# Patient Record
Sex: FEMALE | Race: BLACK OR AFRICAN AMERICAN | Employment: OTHER | RURAL
[De-identification: names, ages, dates, MRNs, and addresses within clinical notes are randomized per-mention and may not be internally consistent; named-entity substitution may affect disease eponyms.]

---

## 2017-03-24 ENCOUNTER — TELEPHONE (OUTPATIENT)
Dept: FAMILY MEDICINE CLINIC | Age: 82
End: 2017-03-24

## 2017-03-24 NOTE — TELEPHONE ENCOUNTER
Patient called ,per [facility] pharmacist they did receive the refill request and hey are ready

## 2017-03-24 NOTE — TELEPHONE ENCOUNTER
[facility] has has [facility] and said 3 rx's were not called in and it it clearly noted in the chart it was done . [facility] .  She states she is out

## 2017-04-17 DIAGNOSIS — I10 ESSENTIAL HYPERTENSION: ICD-10-CM

## 2017-04-17 RX ORDER — AMLODIPINE AND BENAZEPRIL HYDROCHLORIDE 5; 10 MG/1; MG/1
1 CAPSULE ORAL DAILY
Qty: 30 CAP | Refills: 11 | Status: SHIPPED | OUTPATIENT
Start: 2017-04-17 | End: 2017-05-05 | Stop reason: SDUPTHER

## 2017-05-05 ENCOUNTER — OFFICE VISIT (OUTPATIENT)
Dept: FAMILY MEDICINE CLINIC | Age: 82
End: 2017-05-05

## 2017-05-05 VITALS
TEMPERATURE: 98.8 F | WEIGHT: 205 LBS | RESPIRATION RATE: 20 BRPM | HEART RATE: 68 BPM | HEIGHT: 63 IN | SYSTOLIC BLOOD PRESSURE: 154 MMHG | BODY MASS INDEX: 36.32 KG/M2 | OXYGEN SATURATION: 98 % | DIASTOLIC BLOOD PRESSURE: 63 MMHG

## 2017-05-05 DIAGNOSIS — I10 ESSENTIAL HYPERTENSION: ICD-10-CM

## 2017-05-05 DIAGNOSIS — E11.9 DIABETES MELLITUS TYPE 2, DIET-CONTROLLED (HCC): Primary | ICD-10-CM

## 2017-05-05 RX ORDER — AMLODIPINE AND BENAZEPRIL HYDROCHLORIDE 5; 10 MG/1; MG/1
1 CAPSULE ORAL DAILY
Qty: 30 CAP | Refills: 11
Start: 2017-05-05 | End: 2017-09-08 | Stop reason: SDUPTHER

## 2017-05-05 NOTE — PROGRESS NOTES
[address] [address] [facility] [place]   [phone]     5/5/2017  Chief Complaint   Patient presents with    Follow Up Chronic Condition     follow up/BW       HPI  Ms. Jesusita Patino is a 80 y.o. female     Right ankle pain-  Stinging sensation at night. States she has used an arthritis cream which is improving the pain. Patient Active Problem List   Diagnosis Code    Hypertension I10    Hypercholesterolemia E78.00    Chronic kidney disease N18.9    Diabetes mellitus type 2, diet-controlled ([facility] [address] E11.9      No Known Allergies    Current Outpatient Prescriptions on File Prior to Visit   Medication Sig Dispense Refill    metoprolol tartrate (LOPRESSOR) 50 mg tablet TAKE 1 TABLET BY MOUTH 2 TIMES DAILY 60 Tab 5    simvastatin (ZOCOR) 20 mg tablet TAKE 1 TABLET BY MOUTH EVERY NIGHT 30 Tab 5    hydroCHLOROthiazide (HYDRODIURIL) 25 mg tablet TAKE 1 TABLET BY MOUTH ONCE DAILY 30 Tab 5     No current facility-administered medications on file prior to visit.       Lab Results   Component Value Date/Time    Hemoglobin A1c 6.7 09/16/2016 09:55 AM    Hemoglobin A1c 6.6 02/10/2016 08:25 AM    Hemoglobin A1c 6.9 08/12/2015 08:15 AM    Glucose 127 09/16/2016 09:55 AM    Glucose  03/10/2014 01:40 AM    LDL, calculated 93 09/16/2016 09:55 AM    Creatinine 1.43 09/16/2016 09:55 AM     Lab Results   Component Value Date/Time    Cholesterol, total 154 09/16/2016 09:55 AM    HDL Cholesterol 43 09/16/2016 09:55 AM    LDL, calculated 93 09/16/2016 09:55 AM    VLDL, calculated 18 09/16/2016 09:55 AM    Triglyceride 89 09/16/2016 09:55 AM       Lab Results   Component Value Date/Time    Sodium 141 09/16/2016 09:55 AM    Potassium 4.6 09/16/2016 09:55 AM    Chloride 103 09/16/2016 09:55 AM    CO2 23 09/16/2016 09:55 AM    Glucose 127 09/16/2016 09:55 AM    BUN 23 09/16/2016 09:55 AM    Creatinine 1.43 09/16/2016 09:55 AM    BUN/Creatinine ratio 16 09/16/2016 09:55 AM    GFR est AA 39 09/16/2016 09:55 AM    GFR est non-AA 34 09/16/2016 09:55 AM    Calcium 9.2 09/16/2016 09:55 AM    Bilirubin, total 0.3 09/16/2016 09:55 AM    AST (SGOT) 11 09/16/2016 09:55 AM    Alk. phosphatase 66 09/16/2016 09:55 AM    Protein, total 7.6 09/16/2016 09:55 AM    Albumin 4.1 09/16/2016 09:55 AM    A-G Ratio 1.2 09/16/2016 09:55 AM    ALT (SGPT) 9 09/16/2016 09:55 AM         Visit Vitals    /63 (BP 1 Location: Left arm, BP Patient Position: Sitting)    Pulse 68    Temp 98.8 °F (37.1 °C) (Temporal)    Resp 20    Ht 5' 3\" (1.6 m)    Wt 205 lb (93 kg)    SpO2 98%    BMI 36.31 kg/m2     Physical Exam   Cardiovascular: Normal heart sounds. Pulmonary/Chest: Effort normal and breath sounds normal.   Abdominal: Soft. There is no tenderness. Vitals reviewed. Ordoñez July was seen today for follow up chronic condition. Diagnoses and all orders for this visit:    Diabetes mellitus type 2, diet-controlled ([facility] [address]  -     METABOLIC PANEL, BASIC  -     HEMOGLOBIN A1C WITH EAG    Essential hypertension  -     amLODIPine-benazepril (LOTREL) 5-10 mg per capsule; Take 1 Cap by mouth daily.       Plan   Discussed previous lab results  Refill medication  Continue with current regimen       Follow-up Disposition:  Return in about 4 months (around 9/5/2017) for wellness exam .      Angela Major PA-C, MHP

## 2017-05-05 NOTE — PROGRESS NOTES
[address] [address] Medical [place]   [phone]     5/5/2017  Chief Complaint   Patient presents with    Follow Up Chronic Condition     follow up       HPI  Ms. Bryan Armstrong is a 80 y.o. female       Patient Active Problem List   Diagnosis Code    Hypertension I10    Hypercholesterolemia E78.00    Chronic kidney disease N18.9    Diabetes mellitus type 2, diet-controlled ([facility] [address] E11.9      No Known Allergies    Current Outpatient Prescriptions on File Prior to Visit   Medication Sig Dispense Refill    metoprolol tartrate (LOPRESSOR) 50 mg tablet TAKE 1 TABLET BY MOUTH 2 TIMES DAILY 60 Tab 5    simvastatin (ZOCOR) 20 mg tablet TAKE 1 TABLET BY MOUTH EVERY NIGHT 30 Tab 5    hydroCHLOROthiazide (HYDRODIURIL) 25 mg tablet TAKE 1 TABLET BY MOUTH ONCE DAILY 30 Tab 5    amLODIPine-benazepril (LOTREL) 5-10 mg per capsule TAKE 1 CAPSULE BY MOUTH DAILY. 30 Cap 5     No current facility-administered medications on file prior to visit. Visit Vitals    /63 (BP 1 Location: Left arm, BP Patient Position: Sitting)    Pulse 68    Temp 98.8 °F (37.1 °C) (Temporal)    Resp 20    Ht 5' 3\" (1.6 m)    Wt 205 lb (93 kg)    SpO2 98%    BMI 36.31 kg/m2     Physical Exam        Ayden oJhnson was seen today for follow up chronic condition.     Diagnoses and all orders for this visit:    Diabetes mellitus type 2, diet-controlled ([facility] [address]    Plan         Follow-up Disposition: Not on File      Link De La Torre [place], [place]

## 2017-05-05 NOTE — MR AVS SNAPSHOT
Visit Information Date & Time Provider Department Dept. Phone Encounter #  
 5/5/2017 11:30 AM Crystal Torrez, [address] [phone] [id number] Follow-up Instructions Return in about 4 months (around 9/5/2017) for wellness exam . Upcoming Health Maintenance Date Due  
 FOOT EXAM Q1 7/24/1943 MICROALBUMIN Q1 7/24/1943 EYE EXAM RETINAL OR DILATED Q1 7[birth year]1943 GLAUCOMA SCREENING Q2Y 7/24/1998 OSTEOPOROSIS SCREENING (DEXA) 7/24/1998 Pneumococcal 65+ Low/Medium Risk (1 of 2 - PCV13) 7/24/1998 HEMOGLOBIN A1C Q6M 3/16/2017 INFLUENZA AGE 9 TO ADULT 8/1/2017 LIPID PANEL Q1 9/16/2017 MEDICARE YEARLY EXAM 9/17/2017 DTaP/Tdap/Td series (2 - Td) 9/16/2026 Allergies as of 5/5/2017  Review Complete On: 5/5/2017 By: CRISTIANA Murry No Known Allergies Current Immunizations  Reviewed on 9/16/2016 No immunizations on file. Not reviewed this visit You Were Diagnosed With   
  
 Codes Comments Diabetes mellitus type 2, [facility]-controlled ([facility]    -  Primary ICD-10-CM: E11.9 ICD-9-CM: 250.00 Essential hypertension     ICD-10-CM: I10 
ICD-9-CM: 401.9 Vitals BP Pulse Temp Resp Height(growth percentile) 154/63 (BP 1 Location: Left arm, BP Patient Position: Sitting) 68 98.8 °F (37.1 °C) (Temporal) 20 5' 3\" (1.6 m) Weight(growth percentile) SpO2 BMI OB Status Smoking Status 205 lb (93 kg) 98% 36.31 kg/m2 Postmenopausal Never Smoker BMI and BSA Data Body Mass Index Body Surface Area  
 36.31 kg/m 2 2.03 m 2 Preferred Pharmacy Pharmacy Name Phone [id number] [address] [phone] Your Updated Medication List  
  
   
This list is accurate as of: 5/5/17 11:49 AM.  Always use your most recent med list. amLODIPine-benazepril 5-10 mg per capsule Commonly known as:  Bekah Ritchie  
 Take 1 Cap by mouth daily. hydroCHLOROthiazide 25 mg tablet Commonly known as:  HYDRODIURIL  
TAKE 1 TABLET BY MOUTH ONCE DAILY  
  
 metoprolol tartrate 50 mg tablet Commonly known as:  LOPRESSOR  
TAKE 1 TABLET BY MOUTH 2 TIMES DAILY  
  
 simvastatin 20 mg tablet Commonly known as:  ZOCOR  
TAKE 1 TABLET BY MOUTH EVERY NIGHT We Performed the Following HEMOGLOBIN A1C WITH EAG [00880 CPT(R)] METABOLIC PANEL, BASIC [86746 CPT(R)] Follow-up Instructions Return in about 4 months (around 9/5/2017) for wellness exam . Introducing \[facility]\"" & HEALTH SERVICES! Julio Greenfield introduces [employer] patient portal. Now you can access parts of your medical record, email your doctor's office, and request medication refills online. 1. In your internet browser, go to [email or web address] 2. Click on the First Time User? Click Here link in the Sign In box. You will see the New Member Sign Up page. 3. Enter your [employer] Access Code exactly as it appears below. You will not need to use this code after youve completed the sign-up process. If you do not sign up before the expiration date, you must request a new code. · [employer] Access Code: [email or web address] Expires: 8/3/2017 11:49 AM 
 
4. Enter the last four digits of your Social Security Number (xxxx) and Date of Birth (mm/dd/yyyy) as indicated and click Submit. You will be taken to the next sign-up page. 5. Create a [employer] ID. This will be your [employer] login ID and cannot be changed, so think of one that is secure and easy to remember. 6. Create a [employer] password. You can change your password at any time. 7. Enter your Password Reset Question and Answer. This can be used at a later time if you forget your password. 8. Enter your e-mail address. You will receive e-mail notification when new information is available in [address] 9. Click Sign Up. You can now view and download portions of your medical record. 10. Click the Download Summary menu link to download a portable copy of your medical information. If you have questions, please visit the Frequently Asked Questions section of the [employer] website. Remember, [employer] is NOT to be used for urgent needs. For medical emergencies, dial 911. Now available from your iPhone and Android! Please provide this summary of care documentation to your next provider. Your primary care clinician is listed as Carleen Spurling. If you have any questions after today's visit, please call [phone].

## 2017-05-06 LAB
BUN SERPL-MCNC: 24 MG/DL (ref 8–27)
BUN/CREAT SERPL: 18 (ref 12–28)
CALCIUM SERPL-MCNC: 9.3 MG/DL (ref 8.7–10.3)
CHLORIDE SERPL-SCNC: 102 MMOL/L (ref 96–106)
CO2 SERPL-SCNC: 19 MMOL/L (ref 18–29)
CREAT SERPL-MCNC: 1.34 MG/DL (ref 0.57–1)
EST. AVERAGE GLUCOSE BLD GHB EST-MCNC: 146 MG/DL
GLUCOSE SERPL-MCNC: 106 MG/DL (ref 65–99)
HBA1C MFR BLD: 6.7 % (ref 4.8–5.6)
INTERPRETATION: NORMAL
POTASSIUM SERPL-SCNC: 4.8 MMOL/L (ref 3.5–5.2)
SODIUM SERPL-SCNC: 139 MMOL/L (ref 134–144)

## 2017-05-08 NOTE — PROGRESS NOTES
Kidney function is diminished but stable  Blood sugar is well controlled     Continue with current regimen

## 2017-09-08 ENCOUNTER — OFFICE VISIT (OUTPATIENT)
Dept: FAMILY MEDICINE CLINIC | Age: 82
End: 2017-09-08

## 2017-09-08 VITALS
WEIGHT: 203 LBS | DIASTOLIC BLOOD PRESSURE: 65 MMHG | RESPIRATION RATE: 16 BRPM | OXYGEN SATURATION: 98 % | TEMPERATURE: 98.4 F | HEART RATE: 62 BPM | BODY MASS INDEX: 35.97 KG/M2 | HEIGHT: 63 IN | SYSTOLIC BLOOD PRESSURE: 144 MMHG

## 2017-09-08 DIAGNOSIS — I10 ESSENTIAL HYPERTENSION: ICD-10-CM

## 2017-09-08 DIAGNOSIS — Z00.00 MEDICARE ANNUAL WELLNESS VISIT, SUBSEQUENT: Primary | ICD-10-CM

## 2017-09-08 DIAGNOSIS — N18.30 CHRONIC KIDNEY DISEASE, STAGE 3 (MODERATE): ICD-10-CM

## 2017-09-08 DIAGNOSIS — Z71.89 ADVANCE DIRECTIVE DISCUSSED WITH PATIENT: ICD-10-CM

## 2017-09-08 DIAGNOSIS — E78.00 HYPERCHOLESTEROLEMIA: ICD-10-CM

## 2017-09-08 DIAGNOSIS — E11.9 DIABETES MELLITUS TYPE 2, DIET-CONTROLLED (HCC): ICD-10-CM

## 2017-09-08 RX ORDER — METOPROLOL TARTRATE 50 MG/1
50 TABLET ORAL 2 TIMES DAILY
Qty: 60 TAB | Refills: 5 | Status: SHIPPED | OUTPATIENT
Start: 2017-09-08 | End: 2018-03-15 | Stop reason: SDUPTHER

## 2017-09-08 RX ORDER — AMLODIPINE AND BENAZEPRIL HYDROCHLORIDE 5; 10 MG/1; MG/1
1 CAPSULE ORAL DAILY
Qty: 90 CAP | Refills: 1 | Status: SHIPPED | OUTPATIENT
Start: 2017-09-08 | End: 2018-09-17 | Stop reason: SDUPTHER

## 2017-09-08 RX ORDER — SIMVASTATIN 20 MG/1
20 TABLET, FILM COATED ORAL
Qty: 90 TAB | Refills: 1 | Status: SHIPPED | OUTPATIENT
Start: 2017-09-08 | End: 2018-03-15 | Stop reason: SDUPTHER

## 2017-09-08 RX ORDER — HYDROCHLOROTHIAZIDE 25 MG/1
25 TABLET ORAL DAILY
Qty: 90 TAB | Refills: 1 | Status: SHIPPED | OUTPATIENT
Start: 2017-09-08 | End: 2018-03-15 | Stop reason: SDUPTHER

## 2017-09-08 NOTE — PATIENT INSTRUCTIONS

## 2017-09-08 NOTE — MR AVS SNAPSHOT
Visit Information Date & Time Provider Department Dept. Phone Encounter #  
 9/8/2017  9:00 AM Álvaro Stephenson [address] [phone] [id number] Follow-up Instructions Return in about 6 months (around 3/8/2018). Upcoming Health Maintenance Date Due  
 FOOT EXAM Q1 7/24/1943 MICROALBUMIN Q1 7/24/1943 EYE EXAM RETINAL OR DILATED Q1 7/24/1943 GLAUCOMA SCREENING Q2Y 7/24/1998 OSTEOPOROSIS SCREENING (DEXA) 7/24/1998 Pneumococcal 65+ Low/Medium Risk (1 of 2 - PCV13) 7/24/1998 INFLUENZA AGE 9 TO ADULT 8/1/2017 LIPID PANEL Q1 9/16/2017 MEDICARE YEARLY EXAM 9/17/2017 HEMOGLOBIN A1C Q6M 11/5/2017 DTaP/Tdap/Td series (2 - Td) 9/16/2026 Allergies as of 9/8/2017  Review Complete On: 9/8/2017 By: CRISTIANA Gonzalez No Known Allergies Current Immunizations  Reviewed on 9/16/2016 No immunizations on file. Not reviewed this visit You Were Diagnosed With   
  
 Codes Comments Diabetes mellitus type 2, diet-controlled ([facility] [address]    -  Primary ICD-10-CM: E11.9 ICD-9-CM: 250.00 Essential hypertension     ICD-10-CM: I10 
ICD-9-CM: 401.9 Chronic kidney disease, stage 3 (moderate)     ICD-10-CM: N18.3 ICD-9-CM: 873. 3 Advance directive discussed with patient     ICD-10-CM: Z71.89 ICD-9-CM: V65.49 Hypercholesterolemia     ICD-10-CM: E78.00 ICD-9-CM: 272.0 Vitals BP Pulse Temp Resp Height(growth percentile) 144/65 (BP 1 Location: Left arm, BP Patient Position: Sitting) 62 98.4 °F (36.9 °C) (Temporal) 16 5' 3\" (1.6 m) Weight(growth percentile) SpO2 BMI OB Status Smoking Status 203 lb (92.1 kg) 98% 35.96 kg/m2 Postmenopausal Never Smoker Vitals History BMI and BSA Data Body Mass Index Body Surface Area 35.96 kg/m 2 2.02 m 2 Preferred Pharmacy Pharmacy Name Phone [id number], [address] [phone] Your Updated Medication List  
  
   
This list is accurate as of: 9/8/17  9:24 AM.  Always use your most recent med list. amLODIPine-benazepril 5-10 mg per capsule Commonly known as:  Jennifer Dimas Take 1 Cap by mouth daily. Indications: hypertension  
  
 hydroCHLOROthiazide 25 mg tablet Commonly known as:  HYDRODIURIL Take 1 Tab by mouth daily. metoprolol tartrate 50 mg tablet Commonly known as:  LOPRESSOR Take 1 Tab by mouth two (2) times a day. simvastatin 20 mg tablet Commonly known as:  ZOCOR Take 1 Tab by mouth nightly. Indications: mixed hyperlipidemia Prescriptions Sent to Pharmacy Refills  
 amLODIPine-benazepril (LOTREL) 5-10 mg per capsule 1 Sig: Take 1 Cap by mouth daily. Indications: hypertension Class: Normal  
 Pharmacy: [id number], [address] Ph #: [phone] Route: Oral  
 metoprolol tartrate (LOPRESSOR) 50 mg tablet 5 Sig: Take 1 Tab by mouth two (2) times a day. Class: Normal  
 Pharmacy: [facility][id number] [facility], [address] Ph #: [phone] Route: Oral  
 simvastatin (ZOCOR) 20 mg tablet 1 Sig: Take 1 Tab by mouth nightly. Indications: mixed hyperlipidemia Class: Normal  
 Pharmacy: [facility] #[id number] [place], [address] Ph #: [phone] Route: Oral  
 hydroCHLOROthiazide (HYDRODIURIL) 25 mg tablet 1 Sig: Take 1 Tab by mouth daily. Class: Normal  
 Pharmacy: [facility] #[id number] [place], [address] Ph #: [phone] Route: Oral  
  
Follow-up Instructions Return in about 6 months (around 3/8/2018). Introducing [facility] & HEALTH SERVICES! [facility] introduces [employer] patient portal. Now you can access parts of your medical record, email your doctor's office, and request medication refills online.    
 
1. In your internet browser, go to [email or web address] 2. Click on the First Time User? Click Here link in the Sign In box. You will see the New Member Sign Up page. 3. Enter your [employer] Access Code exactly as it appears below. You will not need to use this code after youve completed the sign-up process. If you do not sign up before the expiration date, you must request a new code. · [employer] Access Code: [email or web address] Expires: 12/7/2017  9:22 AM 
 
4. Enter the last four digits of your Social Security Number (xxxx) and Date of Birth (mm/dd/yyyy) as indicated and click Submit. You will be taken to the next sign-up page. 5. Create a [employer] ID. This will be your [employer] login ID and cannot be changed, so think of one that is secure and easy to remember. 6. Create a [employer] password. You can change your password at any time. 7. Enter your Password Reset Question and Answer. This can be used at a later time if you forget your password. 8. Enter your e-mail address. You will receive e-mail notification when new information is available in [address] 9. Click Sign Up. You can now view and download portions of your medical record. 10. Click the Download Summary menu link to download a portable copy of your medical information. If you have questions, please visit the Frequently Asked Questions section of the [employer] website. Remember, [employer] is NOT to be used for urgent needs. For medical emergencies, dial 911. Now available from your iPhone and Android! Please provide this summary of care documentation to your next provider. Your primary care clinician is listed as Carley Akhtar. If you have any questions after today's visit, please call [phone].

## 2017-09-08 NOTE — PROGRESS NOTES
[address], [facility] [place]   [phone]     9/8/2017  Chief Complaint   Patient presents with   [facility] Annual Wellness Visit       HPI  Ms. Roxanne Lujan is a 80 y.o. female presents today in routine follow up. States she is feeling well overall and without concerns. Past Medical History:   Diagnosis Date    Chronic kidney disease     Hypercholesterolemia     Hypertension        No Known Allergies        Current Outpatient Prescriptions:     amLODIPine-benazepril (LOTREL) 5-10 mg per capsule, Take 1 Cap by mouth daily. Indications: hypertension, Disp: 90 Cap, Rfl: 1    metoprolol tartrate (LOPRESSOR) 50 mg tablet, Take 1 Tab by mouth two (2) times a day., Disp: 60 Tab, Rfl: 5    simvastatin (ZOCOR) 20 mg tablet, Take 1 Tab by mouth nightly. Indications: mixed hyperlipidemia, Disp: 90 Tab, Rfl: 1    hydroCHLOROthiazide (HYDRODIURIL) 25 mg tablet, Take 1 Tab by mouth daily. , Disp: 90 Tab, Rfl: 1    Lab Results   Component Value Date/Time    Hemoglobin A1c 6.7 05/05/2017 11:48 AM    Hemoglobin A1c 6.7 09/16/2016 09:55 AM    Hemoglobin A1c 6.6 02/10/2016 08:25 AM    Glucose 106 05/05/2017 11:48 AM    Glucose  03/10/2014 01:40 AM    LDL, calculated 93 09/16/2016 09:55 AM    Creatinine 1.34 05/05/2017 11:48 AM           Visit Vitals    /65 (BP 1 Location: Left arm, BP Patient Position: Sitting)    Pulse 62    Temp 98.4 °F (36.9 °C) (Temporal)    Resp 16    Ht 5' 3\" (1.6 m)    Wt 203 lb (92.1 kg)    SpO2 98%    BMI 35.96 kg/m2     Physical Exam   Constitutional: She appears well-developed and well-nourished. No distress. Cardiovascular: Normal heart sounds. Pulmonary/Chest: Effort normal and breath sounds normal.   Abdominal: Soft. Vitals reviewed. ICD-10-CM ICD-9-CM    1. Medicare annual wellness visit, subsequent Z00.00 V70.0    2.  Diabetes mellitus type 2, diet-controlled (HCC) E11.9 250.00 HEMOGLOBIN A1C WITH EAG   3. Essential hypertension I10 401.9 amLODIPine-benazepril (LOTREL) 5-10 mg per capsule      metoprolol tartrate (LOPRESSOR) 50 mg tablet      hydroCHLOROthiazide (HYDRODIURIL) 25 mg tablet   4. Chronic kidney disease, stage 3 (moderate) N18.3 585.3 CBC WITH AUTOMATED DIFF      METABOLIC PANEL, COMPREHENSIVE   5. Advance directive discussed with patient Z71.89 V65.49    6. Hypercholesterolemia E78.00 272.0 simvastatin (ZOCOR) 20 mg tablet      LIPID PANEL WITH LDL/HDL RATIO           Follow-up Disposition:  Return in about 6 months (around 3/8/2018). Gayayala Genoa ADELSO VILLANUEVA    This is a Subsequent Medicare Annual Wellness Exam (AWV) (Performed 12 months after IPPE or effective date of Medicare Part B enrollment, Once in a lifetime)    I have reviewed the patient's medical history in detail and updated the computerized patient record. History     Past Medical History:   Diagnosis Date    Chronic kidney disease     Hypercholesterolemia     Hypertension       No past surgical history on file. Current Outpatient Prescriptions   Medication Sig Dispense Refill    amLODIPine-benazepril (LOTREL) 5-10 mg per capsule Take 1 Cap by mouth daily. Indications: hypertension 90 Cap 1    metoprolol tartrate (LOPRESSOR) 50 mg tablet Take 1 Tab by mouth two (2) times a day. 60 Tab 5    simvastatin (ZOCOR) 20 mg tablet Take 1 Tab by mouth nightly. Indications: mixed hyperlipidemia 90 Tab 1    hydroCHLOROthiazide (HYDRODIURIL) 25 mg tablet Take 1 Tab by mouth daily. 90 Tab 1     No Known Allergies  No family history on file.   Social History   Substance Use Topics    Smoking status: Never Smoker    Smokeless tobacco: Not on file    Alcohol use No     Patient Active Problem List   Diagnosis Code    Hypertension I10    Hypercholesterolemia E78.00    Chronic kidney disease N18.9    Diabetes mellitus type 2, diet-controlled ([facility] [address] E11.9       Depression Risk Factor Screening:     PHQ over the last two weeks 9/8/2017   Little interest or pleasure in doing things Not at all   Feeling down, depressed or hopeless Not at all   Total Score PHQ 2 0     Alcohol Risk Factor Screening: You do not drink alcohol or very rarely. Functional Ability and Level of Safety:   Hearing Loss  Hearing is good. Activities of Daily Living  The home contains: no safety equipment  Patient does total self care    Fall Risk  Fall Risk Assessment, last 12 mths 9/8/2017   Able to walk? Yes   Fall in past 12 months? No       Abuse Screen  Patient is not abused    Cognitive Screening   Evaluation of Cognitive Function:  Has your family/caregiver stated any concerns about your memory: no  Normal    Patient Care Team   Patient Care Team:  CRISTIANA Mendoza as PCP - General    Assessment/Plan   Education and counseling provided:  Are appropriate based on today's review and evaluation    Diagnoses and all orders for this visit:    1. Medicare annual wellness visit, subsequent    2. Diabetes mellitus type 2, diet-controlled (HCC)  -     HEMOGLOBIN A1C WITH EAG    3. Essential hypertension  -     amLODIPine-benazepril (LOTREL) 5-10 mg per capsule; Take 1 Cap by mouth daily. Indications: hypertension  -     metoprolol tartrate (LOPRESSOR) 50 mg tablet; Take 1 Tab by mouth two (2) times a day. -     hydroCHLOROthiazide (HYDRODIURIL) 25 mg tablet; Take 1 Tab by mouth daily. 4. Chronic kidney disease, stage 3 (moderate)  -     CBC WITH AUTOMATED DIFF  -     METABOLIC PANEL, COMPREHENSIVE    5. Advance directive discussed with patient    6. Hypercholesterolemia  -     simvastatin (ZOCOR) 20 mg tablet; Take 1 Tab by mouth nightly.  Indications: mixed hyperlipidemia  -     LIPID PANEL WITH LDL/HDL RATIO        Health Maintenance Due   Topic Date Due    FOOT EXAM Q1  07/24/1943    MICROALBUMIN Q1  07/24/1943    EYE EXAM RETINAL OR DILATED Q1  07/24/1943    GLAUCOMA SCREENING Q2Y  07/24/1998    OSTEOPOROSIS SCREENING (DEXA)  07/24/1998    Pneumococcal 65+ Low/Medium Risk (1 of 2 - PCV13) 07/24/1998    INFLUENZA AGE 9 TO ADULT  08/01/2017    LIPID PANEL Q1  09/16/2017

## 2017-09-09 LAB
ALBUMIN SERPL-MCNC: 4.3 G/DL (ref 3.5–4.7)
ALBUMIN/GLOB SERPL: 1.2 {RATIO} (ref 1.2–2.2)
ALP SERPL-CCNC: 68 IU/L (ref 39–117)
ALT SERPL-CCNC: 9 IU/L (ref 0–32)
AST SERPL-CCNC: 13 IU/L (ref 0–40)
BASOPHILS # BLD AUTO: 0 X10E3/UL (ref 0–0.2)
BASOPHILS NFR BLD AUTO: 1 %
BILIRUB SERPL-MCNC: 0.5 MG/DL (ref 0–1.2)
BUN SERPL-MCNC: 23 MG/DL (ref 8–27)
BUN/CREAT SERPL: 16 (ref 12–28)
CALCIUM SERPL-MCNC: 9.3 MG/DL (ref 8.7–10.3)
CHLORIDE SERPL-SCNC: 102 MMOL/L (ref 96–106)
CHOLEST SERPL-MCNC: 155 MG/DL (ref 100–199)
CO2 SERPL-SCNC: 21 MMOL/L (ref 18–29)
CREAT SERPL-MCNC: 1.48 MG/DL (ref 0.57–1)
EOSINOPHIL # BLD AUTO: 0.3 X10E3/UL (ref 0–0.4)
EOSINOPHIL NFR BLD AUTO: 5 %
ERYTHROCYTE [DISTWIDTH] IN BLOOD BY AUTOMATED COUNT: 15.2 % (ref 12.3–15.4)
EST. AVERAGE GLUCOSE BLD GHB EST-MCNC: 146 MG/DL
GLOBULIN SER CALC-MCNC: 3.6 G/DL (ref 1.5–4.5)
GLUCOSE SERPL-MCNC: 127 MG/DL (ref 65–99)
HBA1C MFR BLD: 6.7 % (ref 4.8–5.6)
HCT VFR BLD AUTO: 34.6 % (ref 34–46.6)
HDLC SERPL-MCNC: 44 MG/DL
HGB BLD-MCNC: 11.4 G/DL (ref 11.1–15.9)
IMM GRANULOCYTES # BLD: 0 X10E3/UL (ref 0–0.1)
IMM GRANULOCYTES NFR BLD: 0 %
INTERPRETATION: NORMAL
LDLC SERPL CALC-MCNC: 93 MG/DL (ref 0–99)
LDLC/HDLC SERPL: 2.1 RATIO UNITS (ref 0–3.2)
LYMPHOCYTES # BLD AUTO: 1.9 X10E3/UL (ref 0.7–3.1)
LYMPHOCYTES NFR BLD AUTO: 27 %
MCH RBC QN AUTO: 28.8 PG (ref 26.6–33)
MCHC RBC AUTO-ENTMCNC: 32.9 G/DL (ref 31.5–35.7)
MCV RBC AUTO: 87 FL (ref 79–97)
MONOCYTES # BLD AUTO: 0.8 X10E3/UL (ref 0.1–0.9)
MONOCYTES NFR BLD AUTO: 11 %
NEUTROPHILS # BLD AUTO: 4.1 X10E3/UL (ref 1.4–7)
NEUTROPHILS NFR BLD AUTO: 56 %
PLATELET # BLD AUTO: 265 X10E3/UL (ref 150–379)
POTASSIUM SERPL-SCNC: 4.8 MMOL/L (ref 3.5–5.2)
PROT SERPL-MCNC: 7.9 G/DL (ref 6–8.5)
RBC # BLD AUTO: 3.96 X10E6/UL (ref 3.77–5.28)
SODIUM SERPL-SCNC: 140 MMOL/L (ref 134–144)
TRIGL SERPL-MCNC: 89 MG/DL (ref 0–149)
VLDLC SERPL CALC-MCNC: 18 MG/DL (ref 5–40)
WBC # BLD AUTO: 7.2 X10E3/UL (ref 3.4–10.8)

## 2017-09-15 NOTE — PROGRESS NOTES
Kidney function is diminished but stable. Cholesterol levels are within normal limits   Blood sugar level is elevated but stable. No indication for medication. Adhere to a diabetic diet.

## 2018-03-15 DIAGNOSIS — E78.00 HYPERCHOLESTEROLEMIA: ICD-10-CM

## 2018-03-15 DIAGNOSIS — I10 ESSENTIAL HYPERTENSION: ICD-10-CM

## 2018-03-20 RX ORDER — HYDROCHLOROTHIAZIDE 25 MG/1
TABLET ORAL
Qty: 90 TAB | Refills: 1 | Status: SHIPPED | OUTPATIENT
Start: 2018-03-20 | End: 2018-09-14 | Stop reason: SDUPTHER

## 2018-03-20 RX ORDER — SIMVASTATIN 20 MG/1
TABLET, FILM COATED ORAL
Qty: 90 TAB | Refills: 1 | Status: SHIPPED | OUTPATIENT
Start: 2018-03-20 | End: 2018-09-14 | Stop reason: SDUPTHER

## 2018-03-20 RX ORDER — METOPROLOL TARTRATE 50 MG/1
TABLET ORAL
Qty: 60 TAB | Refills: 5 | Status: SHIPPED | OUTPATIENT
Start: 2018-03-20 | End: 2018-09-14 | Stop reason: SDUPTHER

## 2018-03-22 ENCOUNTER — OFFICE VISIT (OUTPATIENT)
Dept: FAMILY MEDICINE CLINIC | Age: 83
End: 2018-03-22

## 2018-03-22 VITALS
HEIGHT: 63 IN | DIASTOLIC BLOOD PRESSURE: 72 MMHG | OXYGEN SATURATION: 97 % | HEART RATE: 62 BPM | RESPIRATION RATE: 22 BRPM | BODY MASS INDEX: 35.9 KG/M2 | TEMPERATURE: 97.8 F | WEIGHT: 202.6 LBS | SYSTOLIC BLOOD PRESSURE: 140 MMHG

## 2018-03-22 DIAGNOSIS — R73.03 PRE-DIABETES: Primary | ICD-10-CM

## 2018-03-22 DIAGNOSIS — I10 ESSENTIAL HYPERTENSION: ICD-10-CM

## 2018-03-22 DIAGNOSIS — Z01.818 PRE-OP EVALUATION: ICD-10-CM

## 2018-03-22 DIAGNOSIS — E78.00 HYPERCHOLESTEROLEMIA: ICD-10-CM

## 2018-03-22 PROBLEM — E66.01 SEVERE OBESITY (BMI 35.0-39.9) WITH COMORBIDITY (HCC): Status: ACTIVE | Noted: 2018-03-22

## 2018-03-22 PROBLEM — E11.21 TYPE 2 DIABETES WITH NEPHROPATHY (HCC): Status: ACTIVE | Noted: 2018-03-22

## 2018-03-22 LAB
ALBUMIN UR QL STRIP: 80 MG/L
CREATININE, URINE POC: 100 MG/DL
MICROALBUMIN/CREAT RATIO POC: NORMAL MG/G

## 2018-03-22 NOTE — MR AVS SNAPSHOT
[address] [address] [address] 
[phone] Patient: Waylon Reis MRN: [id number] VTN:1/44/0495 Visit Information Date & Time Provider Department Dept. Phone Encounter #  
 3/22/2018  8:00 AM Andra Alvarado NP [facility] [address] [facility] [phone] [id number] Upcoming Health Maintenance Date Due  
 FOOT EXAM Q1 7/24/1943 MICROALBUMIN Q1 7/24/1943 HEMOGLOBIN A1C Q6M 3/8/2018 Bone Densitometry (Dexa) Screening 3/23/2019* Pneumococcal 65+ Low/Medium Risk (1 of 2 - PCV13) 3/23/2019* Influenza Age 5 to Adult 3/23/2019* LIPID PANEL Q1 9/8/2018 MEDICARE YEARLY EXAM 9/9/2018 EYE EXAM RETINAL OR DILATED Q1 10/27/2018 GLAUCOMA SCREENING Q2Y 10/27/2019 DTaP/Tdap/Td series (2 - Td) 9/16/2026 *Topic was postponed. The date shown is not the original due date. Allergies as of 3/22/2018  Review Complete On: 3/22/2018 By: Rubens Traylor RN No Known Allergies Current Immunizations  Reviewed on 9/16/2016 No immunizations on file. Not reviewed this visit You Were Diagnosed With   
  
 Codes Comments Pre-diabetes    -  Primary ICD-10-CM: R73.03 
ICD-9-CM: 790.29 BMI 35.0-35.9,adult     ICD-10-CM: I74.77 ICD-9-CM: V85.35 Pre-op evaluation     ICD-10-CM: E11.719 ICD-9-CM: V72.84 Essential hypertension     ICD-10-CM: I10 
ICD-9-CM: 401.9 Hypercholesterolemia     ICD-10-CM: E78.00 ICD-9-CM: 272.0 Vitals BP Pulse Temp Resp Height(growth percentile) 140/72 (BP 1 Location: Left arm, BP Patient Position: Sitting) 62 97.8 °F (36.6 °C) (Axillary) 22 5' 3\" (1.6 m) Weight(growth percentile) SpO2 BMI OB Status Smoking Status 202 lb 9.6 oz (91.9 kg) 97% 35.89 kg/m2 Postmenopausal Never Smoker Vitals History BMI and BSA Data Body Mass Index Body Surface Area  
 35.89 kg/m 2 2.02 m 2 Preferred Pharmacy Pharmacy Name Phone [id number] [phone] Your Updated Medication List  
  
   
This list is accurate as of 3/22/18  9:04 AM.  Always use your most recent med list. amLODIPine-benazepril 5-10 mg per capsule Commonly known as:  Rodriguez Julioh Take 1 Cap by mouth daily. Indications: hypertension  
  
 hydroCHLOROthiazide 25 mg tablet Commonly known as:  HYDRODIURIL  
TAKE 1 TABLET BY MOUTH EVERY DAY  
  
 metoprolol tartrate 50 mg tablet Commonly known as:  LOPRESSOR  
TAKE 1 TABLET BY MOUTH TWICE A DAY  
  
 simvastatin 20 mg tablet Commonly known as:  ZOCOR  
TAKE 1 TABLET BY MOUTH NIGHTLY We Performed the Following CBC WITH AUTOMATED DIFF [05603 CPT(R)] COLLECTION VENOUS BLOOD,VENIPUNCTURE [id number] CPT(R)] HEMOGLOBIN A1C WITH EAG [53856 CPT(R)] LIPID PANEL [85498 CPT(R)] METABOLIC PANEL, COMPREHENSIVE [60696 CPT(R)] Introducing [facility] & HEALTH SERVICES! [facility] introduces Between patient portal. Now you can access parts of your medical record, email your doctor's office, and request medication refills online. 1. In your internet browser, go to [email or web address] 2. Click on the First Time User? Click Here link in the Sign In box. You will see the New Member Sign Up page. 3. Enter your Between Access Code exactly as it appears below. You will not need to use this code after youve completed the sign-up process. If you do not sign up before the expiration date, you must request a new code. · Between Access Code: [email or web address] Expires: 6/20/2018  9:04 AM 
 
4. Enter the last four digits of your Social Security Number (xxxx) and Date of Birth (mm/dd/yyyy) as indicated and click Submit. You will be taken to the next sign-up page. 5. Create a Between ID. This will be your Between login ID and cannot be changed, so think of one that is secure and easy to remember. 6. Create a [employer] Systems password. You can change your password at any time. 7. Enter your Password Reset Question and Answer. This can be used at a later time if you forget your password. 8. Enter your e-mail address. You will receive e-mail notification when new information is available in [address] 9. Click Sign Up. You can now view and download portions of your medical record. 10. Click the Download Summary menu link to download a portable copy of your medical information. If you have questions, please visit the Frequently Asked Questions section of the [employer] website. Remember, [employer] is NOT to be used for urgent needs. For medical emergencies, dial 911. Now available from your iPhone and Android! Please provide this summary of care documentation to your next provider. Your primary care clinician is listed as Ambrose Dalton. If you have any questions after today's visit, please call [phone].

## 2018-03-23 LAB
ALBUMIN SERPL-MCNC: 4.1 G/DL (ref 3.5–4.7)
ALBUMIN/GLOB SERPL: 1.2 {RATIO} (ref 1.2–2.2)
ALP SERPL-CCNC: 67 IU/L (ref 39–117)
ALT SERPL-CCNC: 8 IU/L (ref 0–32)
AST SERPL-CCNC: 14 IU/L (ref 0–40)
BASOPHILS # BLD AUTO: 0 X10E3/UL (ref 0–0.2)
BASOPHILS NFR BLD AUTO: 0 %
BILIRUB SERPL-MCNC: 0.3 MG/DL (ref 0–1.2)
BUN SERPL-MCNC: 25 MG/DL (ref 8–27)
BUN/CREAT SERPL: 17 (ref 12–28)
CALCIUM SERPL-MCNC: 9.3 MG/DL (ref 8.7–10.3)
CHLORIDE SERPL-SCNC: 103 MMOL/L (ref 96–106)
CHOLEST SERPL-MCNC: 162 MG/DL (ref 100–199)
CO2 SERPL-SCNC: 22 MMOL/L (ref 18–29)
CREAT SERPL-MCNC: 1.51 MG/DL (ref 0.57–1)
EOSINOPHIL # BLD AUTO: 0.4 X10E3/UL (ref 0–0.4)
EOSINOPHIL NFR BLD AUTO: 5 %
ERYTHROCYTE [DISTWIDTH] IN BLOOD BY AUTOMATED COUNT: 15.8 % (ref 12.3–15.4)
EST. AVERAGE GLUCOSE BLD GHB EST-MCNC: 146 MG/DL
GFR SERPLBLD CREATININE-BSD FMLA CKD-EPI: 32 ML/MIN/1.73
GFR SERPLBLD CREATININE-BSD FMLA CKD-EPI: 36 ML/MIN/1.73
GLOBULIN SER CALC-MCNC: 3.3 G/DL (ref 1.5–4.5)
GLUCOSE SERPL-MCNC: 123 MG/DL (ref 65–99)
HBA1C MFR BLD: 6.7 % (ref 4.8–5.6)
HCT VFR BLD AUTO: 36.9 % (ref 34–46.6)
HDLC SERPL-MCNC: 41 MG/DL
HGB BLD-MCNC: 11.8 G/DL (ref 11.1–15.9)
IMM GRANULOCYTES # BLD: 0 X10E3/UL (ref 0–0.1)
IMM GRANULOCYTES NFR BLD: 0 %
INTERPRETATION: NORMAL
LDLC SERPL CALC-MCNC: 103 MG/DL (ref 0–99)
LYMPHOCYTES # BLD AUTO: 1.8 X10E3/UL (ref 0.7–3.1)
LYMPHOCYTES NFR BLD AUTO: 25 %
MCH RBC QN AUTO: 27.9 PG (ref 26.6–33)
MCHC RBC AUTO-ENTMCNC: 32 G/DL (ref 31.5–35.7)
MCV RBC AUTO: 87 FL (ref 79–97)
MONOCYTES # BLD AUTO: 0.6 X10E3/UL (ref 0.1–0.9)
MONOCYTES NFR BLD AUTO: 9 %
NEUTROPHILS # BLD AUTO: 4.4 X10E3/UL (ref 1.4–7)
NEUTROPHILS NFR BLD AUTO: 61 %
PLATELET # BLD AUTO: 276 X10E3/UL (ref 150–379)
POTASSIUM SERPL-SCNC: 5 MMOL/L (ref 3.5–5.2)
PROT SERPL-MCNC: 7.4 G/DL (ref 6–8.5)
RBC # BLD AUTO: 4.23 X10E6/UL (ref 3.77–5.28)
SODIUM SERPL-SCNC: 139 MMOL/L (ref 134–144)
TRIGL SERPL-MCNC: 92 MG/DL (ref 0–149)
VLDLC SERPL CALC-MCNC: 18 MG/DL (ref 5–40)
WBC # BLD AUTO: 7.3 X10E3/UL (ref 3.4–10.8)

## 2018-03-23 NOTE — ACP (ADVANCE CARE PLANNING)
Discussed importance of advanced medical directives with patient. Patient is capable of making decisions.     Melba Gregorio NP-C

## 2018-03-23 NOTE — PROGRESS NOTES
Preoperative Evaluation    Date of Exam: 3/22/2018    Diana Majano is a 80 y.o. female (:1933) who presents for preoperative evaluation. BMI:Discussed the patient's BMI with her. The BMI follow up plan is as follows:     dietary management education, guidance, and counseling  encourage exercise  monitor weight  prescribed dietary intake    An After Visit Summary was printed and given to the patient. Latex Allergy: no    Problem List:     Patient Active Problem List    Diagnosis Date Noted    [facility]    Severe obesity (BMI 35.0-39. 9) with comorbidity ([facility] 2018    Pre-diabetes 2018    Hypertension     Hypercholesterolemia     Chronic kidney disease      Medical History:     Past Medical History:   Diagnosis Date    Chronic kidney disease     Hypercholesterolemia     Hypertension      Allergies:   No Known Allergies   Medications:     Current Outpatient Prescriptions   Medication Sig    metoprolol tartrate (LOPRESSOR) 50 mg tablet TAKE 1 TABLET BY MOUTH TWICE A DAY    simvastatin (ZOCOR) 20 mg tablet TAKE 1 TABLET BY MOUTH NIGHTLY    hydroCHLOROthiazide (HYDRODIURIL) 25 mg tablet TAKE 1 TABLET BY MOUTH EVERY DAY    amLODIPine-benazepril (LOTREL) 5-10 mg per capsule Take 1 Cap by mouth daily. Indications: hypertension     No current facility-administered medications for this visit. Surgical History:   History reviewed. No pertinent surgical history.   Social History:     Social History     Social History    Marital status:      Spouse name: N/A    Number of children: N/A    Years of education: N/A     Social History Main Topics    Smoking status: Never Smoker    Smokeless tobacco: Never Used    Alcohol use No    Drug use: None    Sexual activity: Not Asked     Other Topics Concern    None     Social History Narrative       Anesthesia Complications: None  History of abnormal bleeding : None  History of Blood Transfusions: no  Health Care Directive or Living Will: Discussion and information provided    Objective:     ROS:   Feeling well. No dyspnea or chest pain on exertion. No abdominal pain, change in bowel habits, black or bloody stools. No urinary tract symptoms. OBJECTIVE:   The patient appears well, alert, oriented x 3, in no distress. Visit Vitals    /72 (BP 1 Location: Left arm, BP Patient Position: Sitting)    Pulse 62    Temp 97.8 °F (36.6 °C) (Axillary)    Resp 22    Ht 5' 3\" (1.6 m)    Wt 202 lb 9.6 oz (91.9 kg)    SpO2 97%    BMI 35.89 kg/m2     HEENT:ENT normal.  Neck supple. No adenopathy or thyromegaly. ALIDA. Chest: Lungs are clear, good air entry, no wheezes, rhonchi or rales. Cardiovascular: S1 and S2 normal, no murmurs, regular rate and rhythm. Abdomen: soft without tenderness, guarding, mass or organomegaly. Extremities: show no edema, normal peripheral pulses. Neurological: is normal, no focal findings. DIAGNOSTICS:      Labs: CBC metabolic panel and Lipid panel   Acceptable         IMPRESSION:   Low risk for planned surgery  No contraindications to planned surgery    Heike Owen NP   3/22/2018      RTO in 3 months for chronic disease management.

## 2018-03-26 ENCOUNTER — TELEPHONE (OUTPATIENT)
Dept: FAMILY MEDICINE CLINIC | Age: 83
End: 2018-03-26

## 2018-04-11 ENCOUNTER — TELEPHONE (OUTPATIENT)
Dept: FAMILY MEDICINE CLINIC | Age: 83
End: 2018-04-11

## 2018-04-11 NOTE — TELEPHONE ENCOUNTER
Spoke with Sukumar Engel at [facility] office had rt eye surgery 3/27/2018 her left eye is scheduled for 5- and states she needs another  Pre-op within 30 days

## 2018-04-19 ENCOUNTER — OFFICE VISIT (OUTPATIENT)
Dept: FAMILY MEDICINE CLINIC | Age: 83
End: 2018-04-19

## 2018-04-19 VITALS
SYSTOLIC BLOOD PRESSURE: 142 MMHG | BODY MASS INDEX: 37.1 KG/M2 | WEIGHT: 201.6 LBS | OXYGEN SATURATION: 96 % | DIASTOLIC BLOOD PRESSURE: 60 MMHG | HEART RATE: 60 BPM | TEMPERATURE: 97.2 F | HEIGHT: 62 IN | RESPIRATION RATE: 20 BRPM

## 2018-04-19 DIAGNOSIS — E11.9 COMPREHENSIVE DIABETIC FOOT EXAMINATION, TYPE 2 DM, ENCOUNTER FOR (HCC): ICD-10-CM

## 2018-04-19 DIAGNOSIS — Z01.818 PREOP EXAMINATION: Primary | ICD-10-CM

## 2018-04-19 NOTE — MR AVS SNAPSHOT
[address] [address] 
[phone] Patient: Lou President MRN: [id number] JGW:[birth year] Visit Information Date & Time Provider Department Dept. Phone Encounter #  
 4/19/2018  8:30 AM Janet Dumont NP [facility] [address] [facility] [phone] [id number] Follow-up Instructions Return in about 4 months (around 8/19/2018). Upcoming Health Maintenance Date Due  
 FOOT EXAM Q1 7/24/1943 Bone Densitometry (Dexa) Screening 3/23/2019* Pneumococcal 65+ Low/Medium Risk (1 of 2 - PCV13) 3/23/2019* Influenza Age 5 to Adult 3/23/2019* MEDICARE YEARLY EXAM 9/9/2018 HEMOGLOBIN A1C Q6M 9/22/2018 EYE EXAM RETINAL OR DILATED Q1 10/27/2018 MICROALBUMIN Q1 3/22/2019 LIPID PANEL Q1 3/22/2019 GLAUCOMA SCREENING Q2Y 10/27/2019 DTaP/Tdap/Td series (2 - Td) 9/16/2026 *Topic was postponed. The date shown is not the original due date. Allergies as of 4/19/2018  Review Complete On: 4/19/2018 By: Janet Dumont NP No Known Allergies Current Immunizations  Reviewed on 9/16/2016 No immunizations on file. Not reviewed this visit You Were Diagnosed With   
  
 Codes Comments BMI 36.0-36.9,adult    -  Primary ICD-10-CM: G06.82 
ICD-9-CM: V85.36 Vitals BP Pulse Temp Resp Height(growth percentile) 142/60 (BP 1 Location: Left arm, BP Patient Position: Sitting) 60 97.2 °F (36.2 °C) (Temporal) 20 5' 2.25\" (1.581 m) Weight(growth percentile) SpO2 BMI OB Status Smoking Status 201 lb 9.6 oz (91.4 kg) 96% 36.58 kg/m2 Postmenopausal Never Smoker BMI and BSA Data Body Mass Index Body Surface Area  
 36.58 kg/m 2 2 m 2 Preferred Pharmacy Pharmacy Name Phone [id number], [address] [phone] Your Updated Medication List  
  
   
 This list is accurate as of 4/19/18  9:13 AM.  Always use your most recent med list. amLODIPine-benazepril 5-10 mg per capsule Commonly known as:  Price Royalty Take 1 Cap by mouth daily. Indications: hypertension  
  
 hydroCHLOROthiazide 25 mg tablet Commonly known as:  HYDRODIURIL  
TAKE 1 TABLET BY MOUTH EVERY DAY  
  
 metoprolol tartrate 50 mg tablet Commonly known as:  LOPRESSOR  
TAKE 1 TABLET BY MOUTH TWICE A DAY  
  
 simvastatin 20 mg tablet Commonly known as:  ZOCOR  
TAKE 1 TABLET BY MOUTH NIGHTLY Follow-up Instructions Return in about 4 months (around 8/19/2018). Introducing hospitals & HEALTH SERVICES! [employer] introduces PredPol patient portal. Now you can access parts of your medical record, email your doctor's office, and request medication refills online. 1. In your internet browser, go to [email or web address] 2. Click on the First Time User? Click Here link in the Sign In box. You will see the New Member Sign Up page. 3. Enter your PredPol Access Code exactly as it appears below. You will not need to use this code after youve completed the sign-up process. If you do not sign up before the expiration date, you must request a new code. · PredPol Access Code: [email or web address] Expires: 6/20/2018  9:04 AM 
 
4. Enter the last four digits of your Social Security Number (xxxx) and Date of Birth (mm/dd/yyyy) as indicated and click Submit. You will be taken to the next sign-up page. 5. Create a Melophonet ID. This will be your PredPol login ID and cannot be changed, so think of one that is secure and easy to remember. 6. Create a PredPol password. You can change your password at any time. 7. Enter your Password Reset Question and Answer. This can be used at a later time if you forget your password. 8. Enter your e-mail address. You will receive e-mail notification when new information is available in [address] 9. Click Sign Up. You can now view and download portions of your medical record. 10. Click the Download Summary menu link to download a portable copy of your medical information. If you have questions, please visit the Frequently Asked Questions section of the Sonatype website. Remember, Sonatype is NOT to be used for urgent needs. For medical emergencies, dial 911. Now available from your iPhone and Android! Please provide this summary of care documentation to your next provider. Your primary care clinician is listed as Thuan Saldivar. If you have any questions after today's visit, please call [phone].

## 2018-04-19 NOTE — PROGRESS NOTES
1. Have you been to the ER, urgent care clinic since your last visit? Hospitalized since your last visit? No    2. Have you seen or consulted any other health care providers outside of the [address] [place] [address] since your last visit? Include any pap smears or colon screening.  Yes, DR DOMINGUEZ 3- rt catarct

## 2018-04-20 NOTE — PROGRESS NOTES
Preoperative Evaluation    Date of Exam: 2018    Nancie James is a 80 y.o. female (:1933) who presents for preoperative evaluation. Removal of cataract and implantation of intraocular lens  With Dr. Philip Mariano on May 1, 2018    Latex Allergy: no    Problem List:     Patient Active Problem List    Diagnosis Date Noted    Type 2 diabetes with nephropathy ([facility] 2018    Severe obesity (BMI 35.0-39. 9) [facility]    Pre-diabetes 2018    Hypertension     Hypercholesterolemia     Chronic kidney disease      Medical History:     Past Medical History:   Diagnosis Date    Chronic kidney disease     Hypercholesterolemia     Hypertension      Allergies:   No Known Allergies   Medications:     Current Outpatient Prescriptions   Medication Sig    metoprolol tartrate (LOPRESSOR) 50 mg tablet TAKE 1 TABLET BY MOUTH TWICE A DAY    simvastatin (ZOCOR) 20 mg tablet TAKE 1 TABLET BY MOUTH NIGHTLY    hydroCHLOROthiazide (HYDRODIURIL) 25 mg tablet TAKE 1 TABLET BY MOUTH EVERY DAY    amLODIPine-benazepril (LOTREL) 5-10 mg per capsule Take 1 Cap by mouth daily. Indications: hypertension     No current facility-administered medications for this visit. Surgical History:     Past Surgical History:   Procedure Laterality Date    HX CATARACT REMOVAL Right 2018    HX CYST REMOVAL  1976    ear,     Social History:     Social History     Social History    Marital status:      Spouse name: N/A    Number of children: N/A    Years of education: N/A     Social History Main Topics    Smoking status: Never Smoker    Smokeless tobacco: Never Used    Alcohol use No    Drug use: None    Sexual activity: Not Asked     Other Topics Concern    None     Social History Narrative       Anesthesia Complications: None  History of abnormal bleeding : None  History of Blood Transfusions: no  Health Care Directive or Living Will: yes has at home will bring to office.     BMI: BMIDiscussed the patient's BMI with her. The BMI follow up plan is as follows:     dietary management education, guidance, and counseling  encourage exercise  monitor weight  prescribed dietary intake    An After Visit Summary was printed and given to the patient. ROS:   Feeling well. No dyspnea or chest pain on exertion. No abdominal pain, change in bowel habits, black or bloody stools. No urinary tract symptoms. GYN ROS: normal menses, no abnormal bleeding, pelvic pain or discharge, no breast pain or new or enlarging lumps on self exam. No neurological complaints. OBJECTIVE:   The patient appears well, alert, oriented x 3, in no distress. Visit Vitals    /60 (BP 1 Location: Left arm, BP Patient Position: Sitting)    Pulse 60    Temp 97.2 °F (36.2 °C) (Temporal)    Resp 20    Ht 5' 2.25\" (1.581 m)    Wt 201 lb 9.6 oz (91.4 kg)    SpO2 96%    BMI 36.58 kg/m2     HEENT:ENT normal.  Neck supple. No adenopathy or thyromegaly. ALIDA. Chest: Lungs are clear, good air entry, no wheezes, rhonchi or rales. Cardiovascular: S1 and S2 normal, no murmurs, regular rate and rhythm. Abdomen: soft without tenderness, guarding, mass or organomegaly. Extremities: show no edema, normal peripheral pulses. Neurological: is normal, no focal findings.          Diabetic foot exam performed by Bon Chandler NP     Measurement  Response Nurse Comment Physician Comment   Monofilament  R - normal sensation with micro filament  L - normal sensation with micro filament     Pulse DP R - present  L - present     Pulse TP R - present  L - present     Structural deformity R - None  L - None     Skin Integrity / Deformity R - None  L - None        Reviewed by:           DIAGNOSTICS:   Labs reviewed from 3/22/18 visit   attached    IMPRESSION:   Low risk for planned surgery  No contraindications to planned surgery    Bon Chandler NP   4/19/2018

## 2018-08-14 ENCOUNTER — OFFICE VISIT (OUTPATIENT)
Dept: FAMILY MEDICINE CLINIC | Age: 83
End: 2018-08-14

## 2018-08-14 VITALS
WEIGHT: 188.2 LBS | BODY MASS INDEX: 33.35 KG/M2 | HEIGHT: 63 IN | DIASTOLIC BLOOD PRESSURE: 68 MMHG | SYSTOLIC BLOOD PRESSURE: 110 MMHG | RESPIRATION RATE: 16 BRPM | HEART RATE: 80 BPM | TEMPERATURE: 96.2 F | OXYGEN SATURATION: 98 %

## 2018-08-14 DIAGNOSIS — N18.9 CHRONIC KIDNEY DISEASE, UNSPECIFIED CKD STAGE: ICD-10-CM

## 2018-08-14 DIAGNOSIS — I10 ESSENTIAL HYPERTENSION: ICD-10-CM

## 2018-08-14 DIAGNOSIS — E78.00 HYPERCHOLESTEROLEMIA: ICD-10-CM

## 2018-08-14 NOTE — PROGRESS NOTES
1. Have you been to the ER, urgent care clinic since your last visit? Hospitalized since your last visit? no    2. Have you seen or consulted any other health care providers outside of the [address] [place] [address] since your last visit? Include any pap smears or colon screening.   Yes,Seb eye,catarct surgery [phone]

## 2018-08-14 NOTE — PATIENT INSTRUCTIONS
Arthritis: Care Instructions  Your Care Instructions  Arthritis, also called osteoarthritis, is a breakdown of the cartilage that cushions your joints. When the cartilage wears down, your bones rub against each other. This causes pain and stiffness. Many people have some arthritis as they age. Arthritis most often affects the joints of the spine, hands, hips, knees, or feet. You can take simple measures to protect your joints, ease your pain, and help you stay active. Follow-up care is a key part of your treatment and safety. Be sure to make and go to all appointments, and call your doctor if you are having problems. It's also a good idea to know your test results and keep a list of the medicines you take. How can you care for yourself at home? · Stay at a healthy weight. Being overweight puts extra strain on your joints. · Talk to your doctor or physical therapist about exercises that will help ease joint pain. ¨ Stretch. You may enjoy gentle forms of yoga to help keep your joints and muscles flexible. ¨ Walk instead of jog. Other types of exercise that are less stressful on the joints include riding a bicycle, swimming, joseline chi, or water exercise. ¨ Lift weights. Strong muscles help reduce stress on your joints. Stronger thigh muscles, for example, take some of the stress off of the knees and hips. Learn the right way to lift weights so you do not make joint pain worse. · Take your medicines exactly as prescribed. Call your doctor if you think you are having a problem with your medicine. · Take pain medicines exactly as directed. ¨ If the doctor gave you a prescription medicine for pain, take it as prescribed. ¨ If you are not taking a prescription pain medicine, ask your doctor if you can take an over-the-counter medicine. · Use a cane, crutch, walker, or another device if you need help to get around. These can help rest your joints.  You also can use other things to make life easier, such as a higher toilet seat and padded handles on kitchen utensils. · Do not sit in low chairs, which can make it hard to get up. · Put heat or cold on your sore joints as needed. Use whichever helps you most. You also can take turns with hot and cold packs. ¨ Apply heat 2 or 3 times a day for 20 to 30 minutes-using a heating pad, hot shower, or hot pack-to relieve pain and stiffness. ¨ Put ice or a cold pack on your sore joint for 10 to 20 minutes at a time. Put a thin cloth between the ice and your skin. When should you call for help? Call your doctor now or seek immediate medical care if:    · You have sudden swelling, warmth, or pain in any joint.     · You have joint pain and a fever or rash.     · You have such bad pain that you cannot use a joint.    Watch closely for changes in your health, and be sure to contact your doctor if:    · You have mild joint symptoms that continue even with more than 6 weeks of care at home.     · You have stomach pain or other problems with your medicine. Where can you learn more? Go to http://yony15MinutesNOWoysi.info/. Enter N250 in the search box to learn more about \"Arthritis: Care Instructions. \"  Current as of: October 10, 2017  Content Version: 11.7  © 3497-9082 [employer]. Care instructions adapted under license by [employer] (which disclaims liability or warranty for this information). If you have questions about a medical condition or this instruction, always ask your healthcare professional. [place] [address] any warranty or liability for your use of this information. Heart-Healthy Diet: Care Instructions  Your Care Instructions    A heart-healthy diet has lots of vegetables, fruits, nuts, beans, and whole grains, and is low in salt. It limits foods that are high in saturated fat, such as meats, cheeses, and fried foods.  It may be hard to change your diet, but even small changes can lower your risk of heart attack and heart disease. Follow-up care is a key part of your treatment and safety. Be sure to make and go to all appointments, and call your doctor if you are having problems. It's also a good idea to know your test results and keep a list of the medicines you take. How can you care for yourself at home? Watch your portions  · Learn what a serving is. A \"serving\" and a \"portion\" are not always the same thing. Make sure that you are not eating larger portions than are recommended. For example, a serving of pasta is ½ cup. A serving size of meat is 2 to 3 ounces. A 3-ounce serving is about the size of a deck of cards. Measure serving sizes until you are good at [place]" them. Keep in mind that restaurants often serve portions that are 2 or 3 times the size of one serving. · To keep your energy level up and keep you from feeling hungry, eat often but in smaller portions. · Eat only the number of calories you need to stay at a healthy weight. If you need to lose weight, eat fewer calories than your body burns (through exercise and other physical activity). Eat more fruits and vegetables  · Eat a variety of fruit and vegetables every day. Dark green, deep orange, red, or yellow fruits and vegetables are especially good for you. Examples include spinach, carrots, peaches, and berries. · Keep carrots, celery, and other veggies handy for snacks. Buy fruit that is in season and store it where you can see it so that you will be tempted to eat it. · Cook dishes that have a lot of veggies in them, such as stir-fries and soups. Limit saturated and trans fat  · Read food labels, and try to avoid saturated and trans fats. They increase your risk of heart disease. Trans fat is found in many processed foods such as cookies and crackers. · Use olive or canola oil when you cook. Try cholesterol-lowering spreads, such as Benecol or Take Control. · Bake, broil, grill, or steam foods instead of frying them.   · Choose lean meats instead of high-fat meats such as hot dogs and sausages. Cut off all visible fat when you prepare meat. · Eat fish, skinless poultry, and meat alternatives such as soy products instead of high-fat meats. Soy products, such as tofu, may be especially good for your heart. · Choose low-fat or fat-free milk and dairy products. Eat fish  · Eat at least two servings of fish a week. Certain fish, such as salmon and tuna, contain omega-3 fatty acids, which may help reduce your risk of heart attack. Eat foods high in fiber  · Eat a variety of grain products every day. Include whole-grain foods that have lots of fiber and nutrients. Examples of whole-grain foods include oats, whole wheat bread, and brown rice. · Buy whole-grain breads and cereals, instead of white bread or pastries. Limit salt and sodium  · Limit how much salt and sodium you eat to help lower your blood pressure. · Taste food before you salt it. Add only a little salt when you think you need it. With time, your taste buds will adjust to less salt. · Eat fewer snack items, fast foods, and other high-salt, processed foods. Check food labels for the amount of sodium in packaged foods. · Choose low-sodium versions of canned goods (such as soups, vegetables, and beans). Limit sugar  · Limit drinks and foods with added sugar. These include candy, desserts, and soda pop. Limit alcohol  · Limit alcohol to no more than 2 drinks a day for men and 1 drink a day for women. Too much alcohol can cause health problems. When should you call for help? Watch closely for changes in your health, and be sure to contact your doctor if:    · You would like help planning heart-healthy meals. Where can you learn more? Go to http://yony-yosi.info/. Enter V137 in the search box to learn more about \"Heart-Healthy Diet: Care Instructions. \"  Current as of: December 6, 2017  Content Version: 11.7  © 5985-7273 [employer], [facility].  Care instructions adapted under license by [employer] (which disclaims liability or warranty for this information). If you have questions about a medical condition or this instruction, always ask your healthcare professional. [address] any warranty or liability for your use of this information.

## 2018-08-14 NOTE — PROGRESS NOTES
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 He know longer drives. Dariela Jasso is a 80 y.o. female who presents today with the following:  Chief Complaint   Patient presents with    Hypertension     checkup       Patient Active Problem List   Diagnosis Code    Hypertension I10    Hypercholesterolemia E78.00    Chronic kidney disease N18.9    Type 2 diabetes with nephropathy ([facility] [address] E11.21    Severe obesity (BMI 35.0-39. 9) with comorbidity (HCC) E66.01    Pre-diabetes R73.03         COMPLIANT WITH MEDICATION:   HTN; Denies chest pain, dyspnea, palpitations, headache and blurred vision. Blood pressure normotensive. BMI:  Discussed the patient's BMI with her. The BMI follow up plan is as follows:     dietary management education, guidance, and counseling  encourage exercise  monitor weight  prescribed dietary intake    An After Visit Summary was printed and given to the patient. Hypercholesterolemia. CKD : no concerns at this time. ROS:  Gen: denies fever, chills, fatigue, weight loss, weight gain  HEENT:denies blurry vision, nasal congestion, sore throat  Resp: denies dypsnea, cough, wheezing  CV: denies chest pain radiating to the jaws or arms, palpitations, lower extremity edema  Abd: denies nausea, vomiting, diarrhea, constipation  Neuro: denies numbness/tingling  Endo: denies polyuria, polydipsia, heat/cold intolerance  Heme: no lymphadenopathy    No Known Allergies      Current Outpatient Prescriptions:     metoprolol tartrate (LOPRESSOR) 50 mg tablet, TAKE 1 TABLET BY MOUTH TWICE A DAY, Disp: 60 Tab, Rfl: 5    simvastatin (ZOCOR) 20 mg tablet, TAKE 1 TABLET BY MOUTH NIGHTLY, Disp: 90 Tab, Rfl: 1    hydroCHLOROthiazide (HYDRODIURIL) 25 mg tablet, TAKE 1 TABLET BY MOUTH EVERY DAY, Disp: 90 Tab, Rfl: 1    amLODIPine-benazepril (LOTREL) 5-10 mg per capsule, Take 1 Cap by mouth daily.  Indications: hypertension, Disp: 90 Cap, Rfl: 1    Past Medical History:   Diagnosis Date    Chronic kidney disease     Hypercholesterolemia     Hypertension        Past Surgical History:   Procedure Laterality Date    HX CATARACT REMOVAL Right 03/27/2018    HX CATARACT REMOVAL  05/2018    left    HX CYST REMOVAL  1976    ear,       History   Smoking Status    Never Smoker   Smokeless Tobacco    Never Used       Social History     Social History    Marital status:      Spouse name: N/A    Number of children: N/A    Years of education: N/A     Social History Main Topics    Smoking status: Never Smoker    Smokeless tobacco: Never Used    Alcohol use No    Drug use: None    Sexual activity: Not Asked     Other Topics Concern     Service No    Blood Transfusions No    Caffeine Concern No    Occupational Exposure No    Hobby Hazards No    Sleep Concern No    Stress Concern No    Weight Concern No    Special Diet No    Back Care No    Exercise Yes    Bike Helmet No    Seat Belt Yes    Self-Exams Yes     Social History Narrative       Family History   Problem Relation Age of Onset    Hypertension Mother          Objective:     Visit Vitals    /68 (BP 1 Location: Left arm, BP Patient Position: Sitting)    Pulse 80    Temp 96.2 °F (35.7 °C) (Temporal)    Resp 16    Ht 5' 2.5\" (1.588 m)    Wt 188 lb 3.2 oz (85.4 kg)    SpO2 98%    BMI 33.87 kg/m2     Body mass index is 33.87 kg/(m^2). General: Alert and oriented. No acute distress. Well nourished, obese  HEENT :  Ears:TMs are normal. Canals are clear. Eyes: pupils equal, round, react to light and accommodation. Extra ocular movements intact. Nose: patent. Mouth and throat is clear. Neck:supple full range of motion no thyromegaly. Trachea midline, No carotid bruits. No significant lymphadenopathy  Lungs[de-identified] clear to auscultation without wheezes, rales, or rhonchi. Heart :RRR, S1 & S2 are normal intensity. No murmur; no gallop  Abdomen: bowel sounds active.  No tenderness, guarding, rebound, masses, hepatic or spleen enlargement  Back: no CVA tenderness. Extremities: without clubbing, cyanosis, or edema  Pulses: radial and femoral pulses are normal  Neuro: HMF intact. Cranial nerves II through XII grossly normal.  Motor: is 5 over 5 and symmetrical.   Deep tendon reflexes: +2 equal    Results for orders placed or performed in visit on 03/22/18   LIPID PANEL   Result Value Ref Range    Cholesterol, total 162 100 - 199 mg/dL    Triglyceride 92 0 - 149 mg/dL    HDL Cholesterol 41 >39 mg/dL    VLDL, calculated 18 5 - 40 mg/dL    LDL, calculated 103 (H) 0 - 99 mg/dL   METABOLIC PANEL, COMPREHENSIVE   Result Value Ref Range    Glucose 123 (H) 65 - 99 mg/dL    BUN 25 8 - 27 mg/dL    Creatinine 1.51 (H) 0.57 - 1.00 mg/dL    GFR est non-AA 32 (L) >59 mL/min/1.73    GFR est AA 36 (L) >59 mL/min/1.73    BUN/Creatinine ratio 17 12 - 28    Sodium 139 134 - 144 mmol/L    Potassium 5.0 3.5 - 5.2 mmol/L    Chloride 103 96 - 106 mmol/L    CO2 22 18 - 29 mmol/L    Calcium 9.3 8.7 - 10.3 mg/dL    Protein, total 7.4 6.0 - 8.5 g/dL    Albumin 4.1 3.5 - 4.7 g/dL    GLOBULIN, TOTAL 3.3 1.5 - 4.5 g/dL    A-G Ratio 1.2 1.2 - 2.2    Bilirubin, total 0.3 0.0 - 1.2 mg/dL    Alk. phosphatase 67 39 - 117 IU/L    AST (SGOT) 14 0 - 40 IU/L    ALT (SGPT) 8 0 - 32 IU/L   CBC WITH AUTOMATED DIFF   Result Value Ref Range    WBC 7.3 3.4 - 10.8 x10E3/uL    RBC 4.23 3.77 - 5.28 x10E6/uL    HGB 11.8 11.1 - 15.9 g/dL    HCT 36.9 34.0 - 46.6 %    MCV 87 79 - 97 fL    MCH 27.9 26.6 - 33.0 pg    MCHC 32.0 31.5 - 35.7 g/dL    RDW 15.8 (H) 12.3 - 15.4 %    PLATELET 364 394 - 004 x10E3/uL    NEUTROPHILS 61 Not Estab. %    Lymphocytes 25 Not Estab. %    MONOCYTES 9 Not Estab. %    EOSINOPHILS 5 Not Estab. %    BASOPHILS 0 Not Estab. %    ABS. NEUTROPHILS 4.4 1.4 - 7.0 x10E3/uL    Abs Lymphocytes 1.8 0.7 - 3.1 x10E3/uL    ABS. MONOCYTES 0.6 0.1 - 0.9 x10E3/uL    ABS. EOSINOPHILS 0.4 0.0 - 0.4 x10E3/uL    ABS.  BASOPHILS 0.0 0.0 - 0.2 x10E3/uL    IMMATURE GRANULOCYTES 0 Not Estab. %    ABS. IMM. GRANS. 0.0 0.0 - 0.1 x10E3/uL   HEMOGLOBIN A1C WITH EAG   Result Value Ref Range    Hemoglobin A1c 6.7 (H) 4.8 - 5.6 %    Estimated average glucose 146 mg/dL   CKD REPORT   Result Value Ref Range    Interpretation Note    AMB POC URINE, MICROALBUMIN, SEMIQUANT (3 RESULTS)   Result Value Ref Range    ALBUMIN, URINE POC 80 Negative mg/L    CREATININE, URINE  mg/dL    Microalbumin/creat ratio (POC)  <30 MG/G       No results found for this visit on 08/14/18. Assessment/ Plan:     1. BMI 33.0-33.9,adult  Discussed the patient's BMI with her. The BMI follow up plan is as follows:     dietary management education, guidance, and counseling  encourage exercise  monitor weight  prescribed dietary intake    An After Visit Summary was printed and given to the patient.  - CBC WITH AUTOMATED DIFF  - METABOLIC PANEL, COMPREHENSIVE  - COLLECTION VENOUS BLOOD,VENIPUNCTURE    2. Essential hypertension  BP in goal  - CBC WITH AUTOMATED DIFF  - METABOLIC PANEL, COMPREHENSIVE  - COLLECTION VENOUS BLOOD,VENIPUNCTURE    3. Chronic kidney disease, unspecified CKD stage    - CBC WITH AUTOMATED DIFF  - METABOLIC PANEL, COMPREHENSIVE  - COLLECTION VENOUS BLOOD,VENIPUNCTURE    4. Hypercholesterolemia  Discussed diet and exercise  - CBC WITH AUTOMATED DIFF  - METABOLIC PANEL, COMPREHENSIVE  - COLLECTION VENOUS BLOOD,VENIPUNCTURE      Orders Placed This Encounter    COLLECTION VENOUS BLOOD,VENIPUNCTURE    CBC WITH AUTOMATED DIFF    METABOLIC PANEL, COMPREHENSIVE         Verbal and written instructions (see AVS) provided.  Patient expresses understanding of diagnosis and treatment plan. There are no preventive care reminders to display for this patient. Follow-up Disposition:  Return in about 4 months (around 12/14/2018). or sooner as needed.        CYDNEY Gomez

## 2018-08-15 LAB
ALBUMIN SERPL-MCNC: 4.2 G/DL (ref 3.5–4.7)
ALBUMIN/GLOB SERPL: 1.2 {RATIO} (ref 1.2–2.2)
ALP SERPL-CCNC: 68 IU/L (ref 39–117)
ALT SERPL-CCNC: 9 IU/L (ref 0–32)
AST SERPL-CCNC: 16 IU/L (ref 0–40)
BASOPHILS # BLD AUTO: 0.1 X10E3/UL (ref 0–0.2)
BASOPHILS NFR BLD AUTO: 1 %
BILIRUB SERPL-MCNC: 0.4 MG/DL (ref 0–1.2)
BUN SERPL-MCNC: 31 MG/DL (ref 8–27)
BUN/CREAT SERPL: 21 (ref 12–28)
CALCIUM SERPL-MCNC: 9.5 MG/DL (ref 8.7–10.3)
CHLORIDE SERPL-SCNC: 105 MMOL/L (ref 96–106)
CO2 SERPL-SCNC: 22 MMOL/L (ref 20–29)
CREAT SERPL-MCNC: 1.46 MG/DL (ref 0.57–1)
EOSINOPHIL # BLD AUTO: 0.9 X10E3/UL (ref 0–0.4)
EOSINOPHIL NFR BLD AUTO: 10 %
ERYTHROCYTE [DISTWIDTH] IN BLOOD BY AUTOMATED COUNT: 15.7 % (ref 12.3–15.4)
GLOBULIN SER CALC-MCNC: 3.5 G/DL (ref 1.5–4.5)
GLUCOSE SERPL-MCNC: 113 MG/DL (ref 65–99)
HCT VFR BLD AUTO: 34.4 % (ref 34–46.6)
HGB BLD-MCNC: 11 G/DL (ref 11.1–15.9)
IMM GRANULOCYTES # BLD: 0 X10E3/UL (ref 0–0.1)
IMM GRANULOCYTES NFR BLD: 0 %
INTERPRETATION: NORMAL
LYMPHOCYTES # BLD AUTO: 2.3 X10E3/UL (ref 0.7–3.1)
LYMPHOCYTES NFR BLD AUTO: 26 %
MCH RBC QN AUTO: 28.3 PG (ref 26.6–33)
MCHC RBC AUTO-ENTMCNC: 32 G/DL (ref 31.5–35.7)
MCV RBC AUTO: 88 FL (ref 79–97)
MONOCYTES # BLD AUTO: 0.7 X10E3/UL (ref 0.1–0.9)
MONOCYTES NFR BLD AUTO: 8 %
NEUTROPHILS # BLD AUTO: 4.9 X10E3/UL (ref 1.4–7)
NEUTROPHILS NFR BLD AUTO: 55 %
PLATELET # BLD AUTO: 273 X10E3/UL (ref 150–379)
POTASSIUM SERPL-SCNC: 4.6 MMOL/L (ref 3.5–5.2)
PROT SERPL-MCNC: 7.7 G/DL (ref 6–8.5)
RBC # BLD AUTO: 3.89 X10E6/UL (ref 3.77–5.28)
SODIUM SERPL-SCNC: 140 MMOL/L (ref 134–144)
WBC # BLD AUTO: 8.9 X10E3/UL (ref 3.4–10.8)

## 2018-09-14 DIAGNOSIS — E78.00 HYPERCHOLESTEROLEMIA: ICD-10-CM

## 2018-09-14 DIAGNOSIS — I10 ESSENTIAL HYPERTENSION: ICD-10-CM

## 2018-09-14 RX ORDER — SIMVASTATIN 20 MG/1
TABLET, FILM COATED ORAL
Qty: 90 TAB | Refills: 1 | Status: SHIPPED | OUTPATIENT
Start: 2018-09-14 | End: 2019-03-13 | Stop reason: SDUPTHER

## 2018-09-14 RX ORDER — HYDROCHLOROTHIAZIDE 25 MG/1
TABLET ORAL
Qty: 90 TAB | Refills: 1 | Status: SHIPPED | OUTPATIENT
Start: 2018-09-14 | End: 2019-03-13 | Stop reason: SDUPTHER

## 2018-09-14 RX ORDER — METOPROLOL TARTRATE 50 MG/1
TABLET ORAL
Qty: 60 TAB | Refills: 5 | Status: SHIPPED | OUTPATIENT
Start: 2018-09-14 | End: 2019-03-13 | Stop reason: SDUPTHER

## 2019-03-13 DIAGNOSIS — I10 ESSENTIAL HYPERTENSION: ICD-10-CM

## 2019-03-13 DIAGNOSIS — E78.00 HYPERCHOLESTEROLEMIA: ICD-10-CM

## 2019-03-13 RX ORDER — SIMVASTATIN 20 MG/1
TABLET, FILM COATED ORAL
Qty: 90 TAB | Refills: 1 | Status: SHIPPED | OUTPATIENT
Start: 2019-03-13 | End: 2019-09-12 | Stop reason: SDUPTHER

## 2019-03-13 RX ORDER — AMLODIPINE AND BENAZEPRIL HYDROCHLORIDE 5; 10 MG/1; MG/1
CAPSULE ORAL
Qty: 90 CAP | Refills: 0 | Status: SHIPPED | OUTPATIENT
Start: 2019-03-13 | End: 2019-04-15 | Stop reason: SDUPTHER

## 2019-03-13 RX ORDER — HYDROCHLOROTHIAZIDE 25 MG/1
TABLET ORAL
Qty: 90 TAB | Refills: 1 | Status: SHIPPED | OUTPATIENT
Start: 2019-03-13 | End: 2019-09-12 | Stop reason: SDUPTHER

## 2019-03-13 RX ORDER — METOPROLOL TARTRATE 50 MG/1
TABLET ORAL
Qty: 60 TAB | Refills: 5 | Status: SHIPPED | OUTPATIENT
Start: 2019-03-13 | End: 2019-09-12 | Stop reason: SDUPTHER

## 2019-04-15 DIAGNOSIS — I10 ESSENTIAL HYPERTENSION: ICD-10-CM

## 2019-04-15 RX ORDER — AMLODIPINE AND BENAZEPRIL HYDROCHLORIDE 5; 10 MG/1; MG/1
CAPSULE ORAL
Qty: 90 CAP | Refills: 0 | Status: SHIPPED | OUTPATIENT
Start: 2019-04-15 | End: 2019-07-13 | Stop reason: SDUPTHER

## 2019-07-13 DIAGNOSIS — I10 ESSENTIAL HYPERTENSION: ICD-10-CM

## 2019-07-13 RX ORDER — AMLODIPINE AND BENAZEPRIL HYDROCHLORIDE 5; 10 MG/1; MG/1
CAPSULE ORAL
Qty: 30 CAP | Refills: 2 | Status: SHIPPED | OUTPATIENT
Start: 2019-07-13 | End: 2019-10-10 | Stop reason: SDUPTHER

## 2019-07-29 ENCOUNTER — OFFICE VISIT (OUTPATIENT)
Dept: SURGERY | Age: 84
End: 2019-07-29

## 2019-07-29 VITALS
SYSTOLIC BLOOD PRESSURE: 154 MMHG | HEART RATE: 64 BPM | HEIGHT: 63 IN | BODY MASS INDEX: 33.29 KG/M2 | WEIGHT: 187.9 LBS | DIASTOLIC BLOOD PRESSURE: 67 MMHG

## 2019-07-29 DIAGNOSIS — K62.5 RECTAL BLEEDING: Primary | ICD-10-CM

## 2019-07-29 NOTE — PROGRESS NOTES
Kasie Palmer is a 80 y.o. female who presents today with the following:  Chief Complaint   Patient presents with    Colon Cancer Screening       HPI    45-year-old female who presents to us after having gone to the emergency department on July 2 after having 2 episodes of rectal bleeding. In the emergency department she was hemodynamically stable. Her hemoglobin was 11.2. The patient states that the ED physician performed a rectal exam although I do not see this documented in the notes. He felt that there may have been an abdominal mass and he obtained a CT scan of the abdomen and pelvis which showed a very large inhomogenous appearing uterus with recommendations made for gynecologic consultation. There was no evidence of intestinal obstruction. There was a moderate amount of gas and fecal material noted within the colon. The patient was discharged home with follow-up. She presents to us today. Since discharge she has had no further bleeding. At no point has she ever had any abdominal pain or noticed any bleeding. She has never had a colonoscopy. She has no known family history of colon cancer. She has had no prior abdominal surgeries.   Past Medical History:   Diagnosis Date    Chronic kidney disease     Hypercholesterolemia     Hypertension        Past Surgical History:   Procedure Laterality Date    HX CATARACT REMOVAL Right 03/27/2018    HX CATARACT REMOVAL  05/2018    left    HX CYST REMOVAL  1976    ear,       Social History     Socioeconomic History    Marital status:      Spouse name: Not on file    Number of children: Not on file    Years of education: Not on file    Highest education level: Not on file   Occupational History    Not on file   Social Needs    Financial resource strain: Not on file    Food insecurity:     Worry: Not on file     Inability: Not on file    Transportation needs:     Medical: Not on file     Non-medical: Not on file   Tobacco Use    Smoking status: Never Smoker    Smokeless tobacco: Never Used   Substance and Sexual Activity    Alcohol use: No    Drug use: Not on file    Sexual activity: Not on file   Lifestyle    Physical activity:     Days per week: Not on file     Minutes per session: Not on file    Stress: Not on file   Relationships    Social connections:     Talks on phone: Not on file     Gets together: Not on file     Attends Anabaptism service: Not on file     Active member of club or organization: Not on file     Attends meetings of clubs or organizations: Not on file     Relationship status: Not on file    Intimate partner violence:     Fear of current or ex partner: Not on file     Emotionally abused: Not on file     Physically abused: Not on file     Forced sexual activity: Not on file   Other Topics Concern     Service No    Blood Transfusions No    Caffeine Concern No    Occupational Exposure No    Hobby Hazards No    Sleep Concern No    Stress Concern No    Weight Concern No    Special Diet No    Back Care No    Exercise Yes    Bike Helmet No    Seat Belt Yes    Self-Exams Yes   Social History Narrative    Not on file       Family History   Problem Relation Age of Onset    Hypertension Mother        No Known Allergies    Current Outpatient Medications   Medication Sig    amLODIPine-benazepril (LOTREL) 5-10 mg per capsule TAKE ONE CAPSULE BY MOUTH EVERY DAY (HYPERTENSION)    hydroCHLOROthiazide (HYDRODIURIL) 25 mg tablet TAKE 1 TABLET BY MOUTH EVERY DAY    metoprolol tartrate (LOPRESSOR) 50 mg tablet TAKE 1 TABLET BY MOUTH TWICE A DAY    simvastatin (ZOCOR) 20 mg tablet TAKE 1 TABLET BY MOUTH NIGHTLY     No current facility-administered medications for this visit. The above histories, medications and allergies have been reviewed.     ROS    Visit Vitals  /67 (BP 1 Location: Left arm, BP Patient Position: Sitting)   Pulse 64   Ht 5' 3\" (1.6 m)   Wt 187 lb 14.4 oz (85.2 kg)   BMI 33.28 kg/m² Physical Exam   Constitutional: She is well-developed, well-nourished, and in no distress. Cardiovascular: Normal rate and regular rhythm. Pulmonary/Chest: Effort normal and breath sounds normal.   Abdominal: Soft. She exhibits no distension and no mass. There is no tenderness. There is no rebound and no guarding. Genitourinary:   Genitourinary Comments: External exam shows no hemorrhoids. Digital rectal exam did not reveal any intraluminal masses however she does have a firm uterus. Could not ascertain the size as I did not perform a bimanual exam.       1. Rectal bleeding  She has had no further rectal bleeding since seen in the emergency department. I have not determine an etiology of her rectal bleeding at this point. There is some concern based on the size of her uterus but the patient fairly certain that this was not vaginal bleeding. She is scheduled to see her primary care practitioner in approximately 10 days and would like to discuss her planned work-up at that point. In particular CT scan had recommended that she get gynecologic evaluation based on the size of her uterus. She would like to discuss that with her PCP as well as discussing the possibility of colonoscopy. She is 80 which does increase her likelihood of complication with colonoscopy if she was to undergo it but she is very functional as well. I will leave that decision to both her and her primary care provider if we would proceed with colonoscopy. I would like to see her back in 3 weeks if she decides to proceed with colonoscopy. Follow-up and Dispositions    · Return in about 3 weeks (around 8/19/2019) for recheck.          Rivera Gallo MD

## 2019-07-29 NOTE — PATIENT INSTRUCTIONS
Rectal Bleeding: Care Instructions  Your Care Instructions    Rectal bleeding in small amounts is common. You may see red spotting on toilet paper or drops of blood in the toilet. Rectal bleeding has many possible causes, from something as minor as hemorrhoids to something as serious as colon cancer. You may need more tests to find the cause of your bleeding. Follow-up care is a key part of your treatment and safety. Be sure to make and go to all appointments, and call your doctor if you are having problems. It's also a good idea to know your test results and keep a list of the medicines you take. How can you care for yourself at home? · Avoid aspirin and other nonsteroidal anti-inflammatory drugs (NSAIDs), such as ibuprofen (Advil, Motrin) and naproxen (Aleve). They can cause you to bleed more. Ask your doctor if you can take acetaminophen (Tylenol). Read and follow all instructions on the label. · Use a stool softener that contains bran or psyllium. You can save money by buying bran or psyllium (available in bulk at most health food stores) and sprinkling it on foods or stirring it into fruit juice. You can also use a product such as Metamucil or Citrucel. · Take your medicines exactly as directed. Call your doctor if you think you are having a problem with your medicine. When should you call for help? Call 911 anytime you think you may need emergency care. For example, call if:    · You passed out (lost consciousness).    Call your doctor now or seek immediate medical care if:    · You have new or worse pain.     · You have new or worse bleeding from the rectum.     · You are dizzy or light-headed, or you feel like you may faint.    Watch closely for changes in your health, and be sure to contact your doctor if:    · You cannot pass stools or gas.     · You do not get better as expected. Where can you learn more? Go to http://yony-yosi.info/.   Enter S092 in the search box to learn more about \"Rectal Bleeding: Care Instructions. \"  Current as of: November 7, 2018  Content Version: 12.1  © 3850-2614 [employer]. Care instructions adapted under license by [employer] (which disclaims liability or warranty for this information). If you have questions about a medical condition or this instruction, always ask your healthcare professional. [address] any warranty or liability for your use of this information.

## 2019-09-12 DIAGNOSIS — E78.00 HYPERCHOLESTEROLEMIA: ICD-10-CM

## 2019-09-12 DIAGNOSIS — I10 ESSENTIAL HYPERTENSION: ICD-10-CM

## 2019-09-16 RX ORDER — HYDROCHLOROTHIAZIDE 25 MG/1
TABLET ORAL
Qty: 30 TAB | Refills: 5 | Status: SHIPPED | OUTPATIENT
Start: 2019-09-16 | End: 2020-03-10

## 2019-09-16 RX ORDER — SIMVASTATIN 20 MG/1
TABLET, FILM COATED ORAL
Qty: 30 TAB | Refills: 5 | Status: SHIPPED | OUTPATIENT
Start: 2019-09-16 | End: 2020-03-10

## 2019-09-16 RX ORDER — METOPROLOL TARTRATE 50 MG/1
TABLET ORAL
Qty: 60 TAB | Refills: 5 | Status: SHIPPED | OUTPATIENT
Start: 2019-09-16 | End: 2020-03-10

## 2019-10-10 DIAGNOSIS — I10 ESSENTIAL HYPERTENSION: ICD-10-CM

## 2019-10-10 RX ORDER — AMLODIPINE AND BENAZEPRIL HYDROCHLORIDE 5; 10 MG/1; MG/1
CAPSULE ORAL
Qty: 30 CAP | Refills: 2 | Status: SHIPPED | OUTPATIENT
Start: 2019-10-10 | End: 2020-01-09

## 2019-12-11 PROBLEM — N18.30 CKD (CHRONIC KIDNEY DISEASE) STAGE 3, GFR 30-59 ML/MIN (HCC): Status: ACTIVE | Noted: 2019-12-11

## 2020-01-09 DIAGNOSIS — I10 ESSENTIAL HYPERTENSION: ICD-10-CM

## 2020-01-09 RX ORDER — AMLODIPINE AND BENAZEPRIL HYDROCHLORIDE 5; 10 MG/1; MG/1
CAPSULE ORAL
Qty: 30 CAP | Refills: 2 | Status: SHIPPED | OUTPATIENT
Start: 2020-01-09 | End: 2020-04-08

## 2020-03-10 DIAGNOSIS — I10 ESSENTIAL HYPERTENSION: ICD-10-CM

## 2020-03-10 DIAGNOSIS — E78.00 HYPERCHOLESTEROLEMIA: ICD-10-CM

## 2020-03-10 RX ORDER — METOPROLOL TARTRATE 50 MG/1
TABLET ORAL
Qty: 60 TAB | Refills: 5 | Status: SHIPPED | OUTPATIENT
Start: 2020-03-10 | End: 2020-09-09

## 2020-03-10 RX ORDER — HYDROCHLOROTHIAZIDE 25 MG/1
TABLET ORAL
Qty: 30 TAB | Refills: 5 | Status: SHIPPED | OUTPATIENT
Start: 2020-03-10 | End: 2020-09-01

## 2020-03-10 RX ORDER — SIMVASTATIN 20 MG/1
TABLET, FILM COATED ORAL
Qty: 30 TAB | Refills: 5 | Status: SHIPPED | OUTPATIENT
Start: 2020-03-10 | End: 2020-09-09

## 2020-04-08 DIAGNOSIS — I10 ESSENTIAL HYPERTENSION: ICD-10-CM

## 2020-04-08 RX ORDER — AMLODIPINE AND BENAZEPRIL HYDROCHLORIDE 5; 10 MG/1; MG/1
CAPSULE ORAL
Qty: 30 CAP | Refills: 2 | Status: SHIPPED | OUTPATIENT
Start: 2020-04-08 | End: 2020-07-08

## 2020-07-08 DIAGNOSIS — I10 ESSENTIAL HYPERTENSION: ICD-10-CM

## 2020-07-08 RX ORDER — AMLODIPINE AND BENAZEPRIL HYDROCHLORIDE 5; 10 MG/1; MG/1
CAPSULE ORAL
Qty: 30 CAP | Refills: 2 | Status: SHIPPED | OUTPATIENT
Start: 2020-07-08 | End: 2020-10-05

## 2020-09-01 DIAGNOSIS — I10 ESSENTIAL HYPERTENSION: ICD-10-CM

## 2020-09-01 RX ORDER — HYDROCHLOROTHIAZIDE 25 MG/1
TABLET ORAL
Qty: 30 TAB | Refills: 5 | Status: SHIPPED | OUTPATIENT
Start: 2020-09-01 | End: 2021-03-05

## 2020-09-05 DIAGNOSIS — I10 ESSENTIAL HYPERTENSION: ICD-10-CM

## 2020-09-05 DIAGNOSIS — E78.00 HYPERCHOLESTEROLEMIA: ICD-10-CM

## 2020-09-09 RX ORDER — SIMVASTATIN 20 MG/1
TABLET, FILM COATED ORAL
Qty: 30 TAB | Refills: 5 | Status: SHIPPED | OUTPATIENT
Start: 2020-09-09 | End: 2021-03-08

## 2020-09-09 RX ORDER — METOPROLOL TARTRATE 50 MG/1
TABLET ORAL
Qty: 60 TAB | Refills: 5 | Status: SHIPPED | OUTPATIENT
Start: 2020-09-09 | End: 2021-03-08

## 2020-10-05 DIAGNOSIS — I10 ESSENTIAL HYPERTENSION: ICD-10-CM

## 2020-10-05 RX ORDER — AMLODIPINE AND BENAZEPRIL HYDROCHLORIDE 5; 10 MG/1; MG/1
CAPSULE ORAL
Qty: 30 CAP | Refills: 2 | Status: SHIPPED | OUTPATIENT
Start: 2020-10-05 | End: 2021-01-07

## 2021-01-07 DIAGNOSIS — I10 ESSENTIAL HYPERTENSION: ICD-10-CM

## 2021-01-07 RX ORDER — AMLODIPINE AND BENAZEPRIL HYDROCHLORIDE 5; 10 MG/1; MG/1
CAPSULE ORAL
Qty: 30 CAP | Refills: 2 | Status: SHIPPED | OUTPATIENT
Start: 2021-01-07 | End: 2021-04-03

## 2021-03-05 DIAGNOSIS — I10 ESSENTIAL HYPERTENSION: ICD-10-CM

## 2021-03-05 RX ORDER — HYDROCHLOROTHIAZIDE 25 MG/1
TABLET ORAL
Qty: 30 TAB | Refills: 5 | Status: SHIPPED | OUTPATIENT
Start: 2021-03-05 | End: 2021-09-04

## 2021-03-07 DIAGNOSIS — I10 ESSENTIAL HYPERTENSION: ICD-10-CM

## 2021-03-07 DIAGNOSIS — E78.00 HYPERCHOLESTEROLEMIA: ICD-10-CM

## 2021-03-08 RX ORDER — SIMVASTATIN 20 MG/1
TABLET, FILM COATED ORAL
Qty: 30 TAB | Refills: 5 | Status: SHIPPED | OUTPATIENT
Start: 2021-03-08 | End: 2021-09-04

## 2021-03-08 RX ORDER — METOPROLOL TARTRATE 50 MG/1
TABLET ORAL
Qty: 60 TAB | Refills: 5 | Status: SHIPPED | OUTPATIENT
Start: 2021-03-08 | End: 2021-09-04

## 2021-04-03 DIAGNOSIS — I10 ESSENTIAL HYPERTENSION: ICD-10-CM

## 2021-04-03 RX ORDER — AMLODIPINE AND BENAZEPRIL HYDROCHLORIDE 5; 10 MG/1; MG/1
CAPSULE ORAL
Qty: 30 CAP | Refills: 2 | Status: SHIPPED | OUTPATIENT
Start: 2021-04-03 | End: 2021-06-29

## 2021-06-29 DIAGNOSIS — I10 ESSENTIAL HYPERTENSION: ICD-10-CM

## 2021-06-29 RX ORDER — AMLODIPINE AND BENAZEPRIL HYDROCHLORIDE 5; 10 MG/1; MG/1
CAPSULE ORAL
Qty: 30 CAPSULE | Refills: 2 | Status: SHIPPED | OUTPATIENT
Start: 2021-06-29 | End: 2021-09-26

## 2021-09-04 DIAGNOSIS — E78.00 HYPERCHOLESTEROLEMIA: ICD-10-CM

## 2021-09-04 DIAGNOSIS — I10 ESSENTIAL HYPERTENSION: ICD-10-CM

## 2021-09-04 RX ORDER — SIMVASTATIN 20 MG/1
TABLET, FILM COATED ORAL
Qty: 30 TABLET | Refills: 5 | Status: SHIPPED | OUTPATIENT
Start: 2021-09-04 | End: 2022-01-07

## 2021-09-04 RX ORDER — METOPROLOL TARTRATE 50 MG/1
TABLET ORAL
Qty: 60 TABLET | Refills: 5 | Status: SHIPPED | OUTPATIENT
Start: 2021-09-04 | End: 2022-01-24 | Stop reason: SDUPTHER

## 2021-09-04 RX ORDER — HYDROCHLOROTHIAZIDE 25 MG/1
TABLET ORAL
Qty: 30 TABLET | Refills: 5 | Status: SHIPPED | OUTPATIENT
Start: 2021-09-04 | End: 2022-01-24 | Stop reason: SDUPTHER

## 2021-09-26 DIAGNOSIS — I10 ESSENTIAL HYPERTENSION: ICD-10-CM

## 2021-09-26 RX ORDER — AMLODIPINE AND BENAZEPRIL HYDROCHLORIDE 5; 10 MG/1; MG/1
CAPSULE ORAL
Qty: 90 CAPSULE | Refills: 1 | Status: SHIPPED | OUTPATIENT
Start: 2021-09-26 | End: 2022-01-24 | Stop reason: SDUPTHER

## 2022-01-07 DIAGNOSIS — E78.00 HYPERCHOLESTEROLEMIA: ICD-10-CM

## 2022-01-07 RX ORDER — SIMVASTATIN 20 MG/1
TABLET, FILM COATED ORAL
Qty: 90 TABLET | Refills: 1 | Status: SHIPPED | OUTPATIENT
Start: 2022-01-07 | End: 2022-01-24 | Stop reason: SDUPTHER

## 2022-01-24 DIAGNOSIS — E78.00 HYPERCHOLESTEROLEMIA: ICD-10-CM

## 2022-01-24 DIAGNOSIS — I10 ESSENTIAL HYPERTENSION: ICD-10-CM

## 2022-01-24 RX ORDER — SIMVASTATIN 20 MG/1
20 TABLET, FILM COATED ORAL
Qty: 90 TABLET | Refills: 1 | Status: SHIPPED | OUTPATIENT
Start: 2022-01-24 | End: 2022-06-07 | Stop reason: SDUPTHER

## 2022-01-24 RX ORDER — HYDROCHLOROTHIAZIDE 25 MG/1
25 TABLET ORAL DAILY
Qty: 30 TABLET | Refills: 5 | Status: SHIPPED | OUTPATIENT
Start: 2022-01-24 | End: 2022-06-07 | Stop reason: SDUPTHER

## 2022-01-24 RX ORDER — METOPROLOL TARTRATE 50 MG/1
50 TABLET ORAL 2 TIMES DAILY
Qty: 60 TABLET | Refills: 5 | Status: SHIPPED | OUTPATIENT
Start: 2022-01-24 | End: 2022-06-07 | Stop reason: SDUPTHER

## 2022-01-24 RX ORDER — AMLODIPINE AND BENAZEPRIL HYDROCHLORIDE 5; 10 MG/1; MG/1
CAPSULE ORAL
Qty: 90 CAPSULE | Refills: 1 | Status: SHIPPED | OUTPATIENT
Start: 2022-01-24 | End: 2022-06-07 | Stop reason: SDUPTHER

## 2022-03-19 PROBLEM — E11.21 TYPE 2 DIABETES WITH NEPHROPATHY (HCC): Status: ACTIVE | Noted: 2018-03-22

## 2022-03-19 PROBLEM — E66.01 SEVERE OBESITY (BMI 35.0-39.9) WITH COMORBIDITY (HCC): Status: ACTIVE | Noted: 2018-03-22

## 2022-03-19 PROBLEM — N18.30 CKD (CHRONIC KIDNEY DISEASE) STAGE 3, GFR 30-59 ML/MIN (HCC): Status: ACTIVE | Noted: 2019-12-11

## 2022-03-19 PROBLEM — R73.03 PRE-DIABETES: Status: ACTIVE | Noted: 2018-03-22

## 2022-03-20 PROBLEM — K62.5 RECTAL BLEEDING: Status: ACTIVE | Noted: 2019-07-29

## 2022-06-07 ENCOUNTER — OFFICE VISIT (OUTPATIENT)
Dept: FAMILY MEDICINE CLINIC | Age: 87
End: 2022-06-07
Payer: MEDICARE

## 2022-06-07 VITALS
HEART RATE: 80 BPM | HEIGHT: 64 IN | SYSTOLIC BLOOD PRESSURE: 130 MMHG | RESPIRATION RATE: 18 BRPM | BODY MASS INDEX: 27.49 KG/M2 | WEIGHT: 161 LBS | OXYGEN SATURATION: 99 % | TEMPERATURE: 96.8 F | DIASTOLIC BLOOD PRESSURE: 70 MMHG

## 2022-06-07 DIAGNOSIS — R73.03 PRE-DIABETES: ICD-10-CM

## 2022-06-07 DIAGNOSIS — I10 ESSENTIAL HYPERTENSION: ICD-10-CM

## 2022-06-07 DIAGNOSIS — E11.9 COMPREHENSIVE DIABETIC FOOT EXAMINATION, TYPE 2 DM, ENCOUNTER FOR (HCC): ICD-10-CM

## 2022-06-07 DIAGNOSIS — N18.32 STAGE 3B CHRONIC KIDNEY DISEASE (HCC): ICD-10-CM

## 2022-06-07 DIAGNOSIS — E78.00 HYPERCHOLESTEROLEMIA: ICD-10-CM

## 2022-06-07 DIAGNOSIS — Z00.00 MEDICARE ANNUAL WELLNESS VISIT, SUBSEQUENT: Primary | ICD-10-CM

## 2022-06-07 DIAGNOSIS — E11.21 TYPE 2 DIABETES WITH NEPHROPATHY (HCC): ICD-10-CM

## 2022-06-07 DIAGNOSIS — I87.8 VENOUS STASIS: ICD-10-CM

## 2022-06-07 DIAGNOSIS — I70.232 ATHEROSCLEROSIS OF NATIVE ARTERY OF BOTH LOWER EXTREMITIES WITH BILATERAL ULCERATION OF CALVES (HCC): ICD-10-CM

## 2022-06-07 DIAGNOSIS — I70.242 ATHEROSCLEROSIS OF NATIVE ARTERY OF BOTH LOWER EXTREMITIES WITH BILATERAL ULCERATION OF CALVES (HCC): ICD-10-CM

## 2022-06-07 PROCEDURE — 29580 STRAPPING UNNA BOOT: CPT | Performed by: NURSE PRACTITIONER

## 2022-06-07 PROCEDURE — 99214 OFFICE O/P EST MOD 30 MIN: CPT | Performed by: NURSE PRACTITIONER

## 2022-06-07 PROCEDURE — G0439 PPPS, SUBSEQ VISIT: HCPCS | Performed by: NURSE PRACTITIONER

## 2022-06-07 RX ORDER — AMLODIPINE AND BENAZEPRIL HYDROCHLORIDE 5; 10 MG/1; MG/1
CAPSULE ORAL
Qty: 90 CAPSULE | Refills: 1 | Status: SHIPPED | OUTPATIENT
Start: 2022-06-07 | End: 2022-06-16 | Stop reason: SDUPTHER

## 2022-06-07 RX ORDER — HYDROCHLOROTHIAZIDE 25 MG/1
25 TABLET ORAL DAILY
Qty: 30 TABLET | Refills: 5 | Status: SHIPPED | OUTPATIENT
Start: 2022-06-07 | End: 2022-06-16 | Stop reason: SDUPTHER

## 2022-06-07 RX ORDER — METOPROLOL TARTRATE 50 MG/1
50 TABLET ORAL 2 TIMES DAILY
Qty: 60 TABLET | Refills: 5 | Status: SHIPPED | OUTPATIENT
Start: 2022-06-07 | End: 2022-06-16 | Stop reason: SDUPTHER

## 2022-06-07 RX ORDER — SIMVASTATIN 20 MG/1
20 TABLET, FILM COATED ORAL
Qty: 90 TABLET | Refills: 1 | Status: SHIPPED | OUTPATIENT
Start: 2022-06-07 | End: 2022-06-16 | Stop reason: SDUPTHER

## 2022-06-07 NOTE — PROGRESS NOTES
This is the Subsequent Medicare Annual Wellness Exam, performed 12 months or more after the Initial AWV or the last Subsequent AWV    I have reviewed the patient's medical history in detail and updated the computerized patient record. Visit Vitals  /70 (BP 1 Location: Left upper arm, BP Patient Position: Sitting, BP Cuff Size: Adult)   Pulse 80   Temp 96.8 °F (36 °C) (Temporal)   Resp 18   Ht 5' 4\" (1.626 m)   Wt 161 lb (73 kg)   SpO2 99%   BMI 27.64 kg/m²          Diabetic foot exam performed by Yahaira Alvarado NP     Measurement  Response Nurse Comment Physician Comment   Monofilament  R - reduced sensation with micro filament  L - reduced sensation with micro filament     Pulse DP R - present  L - present     Pulse TP R - present  L - present     Structural deformity R - None  L - None     Skin Integrity / Deformity R - Yes - venous stasis and on back of calf pen pointulceration  L - Yes - venous stasis and ulceration calf size of small pencil eraser        Reviewed by:           Assessment/Plan   Education and counseling provided:  Are appropriate based on today's review and evaluation    1. Medicare annual wellness visit, subsequent  2. Pre-diabetes  Continue heart healthy Diet and 30 minutes of activity a day. 3. 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 Venous stasis  -     APPLY OF PASTE BOOT  -     APPLY OF PASTE BOOT  5. Comprehensive diabetic foot examination, type 2 DM, encounter for ([facility] [address]  -      DIABETES FOOT EXAM  6. Essential hypertension  -     hydroCHLOROthiazide (HYDRODIURIL) 25 mg tablet; Take 1 Tablet by mouth daily. , Normal, Disp-30 Tablet, R-5  -     metoprolol tartrate (LOPRESSOR) 50 mg tablet;  Take 1 Tablet by mouth two (2) times a day., Normal, Disp-60 Tablet, R-5  -     amLODIPine-benazepril (LOTREL) 5-10 mg per capsule; TAKE ONE CAPSULE BY MOUTH EVERY DAY (HYPERTENSION), Normal, Disp-90 Capsule, R-1  7. Hypercholesterolemia  -     simvastatin (ZOCOR) 20 mg tablet; Take 1 Tablet by mouth nightly., Normal, Disp-90 Tablet, R-1  8. Type 2 diabetes with nephropathy (HCC)  9. Stage 3b chronic kidney disease ([facility] [address]       Depression Risk Factor Screening     3 most recent PHQ Screens 6/7/2022   Little interest or pleasure in doing things Several days   Feeling down, depressed, irritable, or hopeless Several days   Total Score PHQ 2 2   Trouble falling or staying asleep, or sleeping too much Several days   Feeling tired or having little energy Several days   Poor appetite, weight loss, or overeating Several days   Feeling bad about yourself - or that you are a failure or have let yourself or your family down Several days   Trouble concentrating on things such as school, work, reading, or watching TV Several days   Moving or speaking so slowly that other people could have noticed; or the opposite being so fidgety that others notice Several days   Thoughts of being better off dead, or hurting yourself in some way Several days   PHQ 9 Score 9   How difficult have these problems made it for you to do your work, take care of your home and get along with others Somewhat difficult       Alcohol & Drug Abuse Risk Screen    Do you average more than 1 drink per night or more than 7 drinks a week:  No    On any one occasion in the past three months have you have had more than 3 drinks containing alcohol:  No          Functional Ability and Level of Safety    Hearing: Hearing is good. Activities of Daily Living: The home contains: handrails  Patient does total self care      Ambulation: with mild difficulty     Fall Risk:  Fall Risk Assessment, last 12 mths 11/3/2020   Able to walk? Yes   Fall in past 12 months?  No      Abuse Screen:  Patient is not abused       Cognitive Screening    Has your family/caregiver stated any concerns about your memory: no     Cognitive Screening: Normal - MMSE (Mini Mental Status Exam)    Health Maintenance Due     Health Maintenance Due   Topic Date Due    Pneumococcal 65+ years (1 - PCV) Never done    Eye Exam Retinal or Dilated  Never done    Shingrix Vaccine Age 50> (1 of 2) Never done    Bone Densitometry (Dexa) Screening  Never done    Foot Exam Q1  04/12/2020    MICROALBUMIN Q1  09/30/2021    Lipid Screen  09/30/2021    Depression Screen  11/03/2021       Patient Care Team   Patient Care Team:  Matthew Avendano NP as PCP - General (Nurse Practitioner)  Matthew Avendano NP as PCP - [facility] Empaneled Provider    History     Patient Active Problem List   Diagnosis Code    Hypertension I10    Hypercholesterolemia E78.00    Chronic kidney disease N18.9    Type 2 diabetes with nephropathy ([facility] [address] E11.21    Severe obesity (BMI 35.0-39. 9) with comorbidity ([facility] [address] E66.01    Pre-diabetes R73.03    Rectal bleeding K62.5    CKD (chronic kidney disease) stage 3, GFR 30-59 ml/min (HCC) N18.30     Past Medical History:   Diagnosis Date    Chronic kidney disease     Hypercholesterolemia     Hypertension       Past Surgical History:   Procedure Laterality Date    HX CATARACT REMOVAL Right 03/27/2018    HX CATARACT REMOVAL  05/2018    left    HX CYST REMOVAL  1976    ear,     Current Outpatient Medications   Medication Sig Dispense Refill    simvastatin (ZOCOR) 20 mg tablet Take 1 Tablet by mouth nightly. 90 Tablet 1    metoprolol tartrate (LOPRESSOR) 50 mg tablet Take 1 Tablet by mouth two (2) times a day. 60 Tablet 5    amLODIPine-benazepril (LOTREL) 5-10 mg per capsule TAKE ONE CAPSULE BY MOUTH EVERY DAY (HYPERTENSION) 90 Capsule 1    hydroCHLOROthiazide (HYDRODIURIL) 25 mg tablet Take 1 Tablet by mouth daily.  (Patient not taking: Reported on 6/7/2022) 30 Tablet 5     No Known Allergies    Family History   Problem Relation Age of Onset    Hypertension Mother      Social History     Tobacco Use    Smoking status: Never Smoker    Smokeless tobacco: Never Used   Substance Use Topics    Alcohol use: Tati Calero, RN

## 2022-06-07 NOTE — PATIENT INSTRUCTIONS
Medicare Wellness Visit, Female     The best way to live healthy is to have a lifestyle where you eat a well-balanced diet, exercise regularly, limit alcohol use, and quit all forms of tobacco/nicotine, if applicable. Regular preventive services are another way to keep healthy. Preventive services (vaccines, screening tests, monitoring & exams) can help personalize your care plan, which helps you manage your own care. Screening tests can find health problems at the earliest stages, when they are easiest to treat. [facility] follows the current, evidence-based guidelines published by the [facility] ([facility]) when recommending preventive services for our patients. Because we follow these guidelines, sometimes recommendations change over time as research supports it. (For example, mammograms used to be recommended annually. Even though Medicare will still pay for an annual mammogram, the newer guidelines recommend a mammogram every two years for women of average risk). Of course, you and your doctor may decide to screen more often for some diseases, based on your risk and your co-morbidities (chronic disease you are already diagnosed with). Preventive services for you include:  - Medicare offers their members a free annual wellness visit, which is time for you and your primary care provider to discuss and plan for your preventive service needs. Take advantage of this benefit every year!  -All adults over the age of 72 should receive the recommended pneumonia vaccines. Current USPSTF guidelines recommend a series of two vaccines for the best pneumonia protection.   -All adults should have a flu vaccine yearly and a tetanus vaccine every 10 years.   -All adults age 48 and older should receive the shingles vaccines (series of two vaccines).       -All adults age 38-68 who are overweight should have a diabetes screening test once every three years.   -All adults born between [birth year] and 1965 should be screened once for Hepatitis C.  -Other screening tests and preventive services for persons with diabetes include: an eye exam to screen for diabetic retinopathy, a kidney function test, a foot exam, and stricter control over your cholesterol.   -Cardiovascular screening for adults with routine risk involves an electrocardiogram (ECG) at intervals determined by your doctor.   -Colorectal cancer screenings should be done for adults age 54-65 with no increased risk factors for colorectal cancer. There are a number of acceptable methods of screening for this type of cancer. Each test has its own benefits and drawbacks. Discuss with your doctor what is most appropriate for you during your annual wellness visit. The different tests include: colonoscopy (considered the best screening method), a fecal occult blood test, a fecal DNA test, and sigmoidoscopy.    -A bone mass density test is recommended when a woman turns 65 to screen for osteoporosis. This test is only recommended one time, as a screening. Some providers will use this same test as a disease monitoring tool if you already have osteoporosis. -Breast cancer screenings are recommended every other year for women of normal risk, age 54-69.  -Cervical cancer screenings for women over age 72 are only recommended with certain risk factors.      Here is a list of your current Health Maintenance items (your personalized list of preventive services) with a due date:  Health Maintenance Due   Topic Date Due    Pneumococcal Vaccine (1 - PCV) Never done    Eye Exam  Never done    Shingles Vaccine (1 of 2) Never done    Bone Mineral Density   Never done    Diabetic Foot Care  04/12/2020    Albumin Urine Test  09/30/2021    Cholesterol Test   09/30/2021    Depresssion Screening  11/03/2021

## 2022-06-16 ENCOUNTER — OFFICE VISIT (OUTPATIENT)
Dept: FAMILY MEDICINE CLINIC | Age: 87
End: 2022-06-16
Payer: MEDICARE

## 2022-06-16 VITALS
TEMPERATURE: 97.8 F | DIASTOLIC BLOOD PRESSURE: 76 MMHG | OXYGEN SATURATION: 97 % | RESPIRATION RATE: 17 BRPM | HEIGHT: 64 IN | BODY MASS INDEX: 27.66 KG/M2 | HEART RATE: 92 BPM | SYSTOLIC BLOOD PRESSURE: 130 MMHG | WEIGHT: 162 LBS

## 2022-06-16 DIAGNOSIS — S81.802D LEG WOUND, LEFT, SUBSEQUENT ENCOUNTER: ICD-10-CM

## 2022-06-16 DIAGNOSIS — E78.00 HYPERCHOLESTEROLEMIA: ICD-10-CM

## 2022-06-16 DIAGNOSIS — I73.9 PVD (PERIPHERAL VASCULAR DISEASE) (HCC): ICD-10-CM

## 2022-06-16 DIAGNOSIS — E11.21 TYPE 2 DIABETES WITH NEPHROPATHY (HCC): Primary | ICD-10-CM

## 2022-06-16 DIAGNOSIS — S81.801D LEG WOUND, RIGHT, SUBSEQUENT ENCOUNTER: ICD-10-CM

## 2022-06-16 DIAGNOSIS — I10 ESSENTIAL HYPERTENSION: ICD-10-CM

## 2022-06-16 PROCEDURE — 82044 UR ALBUMIN SEMIQUANTITATIVE: CPT | Performed by: NURSE PRACTITIONER

## 2022-06-16 PROCEDURE — 99214 OFFICE O/P EST MOD 30 MIN: CPT | Performed by: NURSE PRACTITIONER

## 2022-06-16 RX ORDER — SIMVASTATIN 20 MG/1
20 TABLET, FILM COATED ORAL
Qty: 90 TABLET | Refills: 1 | Status: SHIPPED | OUTPATIENT
Start: 2022-06-16

## 2022-06-16 RX ORDER — METOPROLOL TARTRATE 50 MG/1
50 TABLET ORAL 2 TIMES DAILY
Qty: 60 TABLET | Refills: 5 | Status: SHIPPED | OUTPATIENT
Start: 2022-06-16

## 2022-06-16 RX ORDER — AMLODIPINE AND BENAZEPRIL HYDROCHLORIDE 5; 10 MG/1; MG/1
CAPSULE ORAL
Qty: 90 CAPSULE | Refills: 1 | Status: SHIPPED | OUTPATIENT
Start: 2022-06-16 | End: 2022-08-31

## 2022-06-16 RX ORDER — HYDROCHLOROTHIAZIDE 25 MG/1
25 TABLET ORAL DAILY
Qty: 30 TABLET | Refills: 5 | Status: SHIPPED | OUTPATIENT
Start: 2022-06-16

## 2022-06-16 NOTE — PROGRESS NOTES
1. \"Have you been to the ER, urgent care clinic since your last visit? Hospitalized since your last visit? \" No    2. \"Have you seen or consulted any other health care providers outside of the [address] [place] [address] since your last visit? \" No     3. For patients aged 39-70: Has the patient had a colonoscopy / FIT/ Cologuard? Yes - Care Gap present. Most recent result on file      If the patient is female:    4. For patients aged 41-77: Has the patient had a mammogram within the past 2 years? Yes - Care Gap present. Most recent result on file      5. For patients aged 21-65: Has the patient had a pap smear?  NA - based on age or sex

## 2022-06-17 LAB
ALBUMIN SERPL-MCNC: 3.1 G/DL (ref 3.5–5)
ALBUMIN/GLOB SERPL: 0.6 {RATIO} (ref 1.1–2.2)
ALP SERPL-CCNC: 131 U/L (ref 45–117)
ALT SERPL-CCNC: 20 U/L (ref 12–78)
ANION GAP SERPL CALC-SCNC: 9 MMOL/L (ref 5–15)
AST SERPL-CCNC: 20 U/L (ref 15–37)
BASOPHILS # BLD: 0.1 K/UL (ref 0–0.1)
BASOPHILS NFR BLD: 1 % (ref 0–1)
BILIRUB SERPL-MCNC: 0.8 MG/DL (ref 0.2–1)
BUN SERPL-MCNC: 28 MG/DL (ref 6–20)
BUN/CREAT SERPL: 16 (ref 12–20)
CALCIUM SERPL-MCNC: 9.5 MG/DL (ref 8.5–10.1)
CHLORIDE SERPL-SCNC: 110 MMOL/L (ref 97–108)
CHOLEST SERPL-MCNC: 156 MG/DL
CO2 SERPL-SCNC: 22 MMOL/L (ref 21–32)
COMMENT, HOLDF: NORMAL
CREAT SERPL-MCNC: 1.78 MG/DL (ref 0.55–1.02)
DIFFERENTIAL METHOD BLD: ABNORMAL
EOSINOPHIL # BLD: 0.2 K/UL (ref 0–0.4)
EOSINOPHIL NFR BLD: 2 % (ref 0–7)
ERYTHROCYTE [DISTWIDTH] IN BLOOD BY AUTOMATED COUNT: 17.2 % (ref 11.5–14.5)
EST. AVERAGE GLUCOSE BLD GHB EST-MCNC: 131 MG/DL
GLOBULIN SER CALC-MCNC: 4.9 G/DL (ref 2–4)
GLUCOSE SERPL-MCNC: 78 MG/DL (ref 65–100)
HBA1C MFR BLD: 6.2 % (ref 4–5.6)
HCT VFR BLD AUTO: 38.2 % (ref 35–47)
HDLC SERPL-MCNC: 48 MG/DL
HDLC SERPL: 3.3 {RATIO} (ref 0–5)
HGB BLD-MCNC: 11.6 G/DL (ref 11.5–16)
IMM GRANULOCYTES # BLD AUTO: 0 K/UL (ref 0–0.04)
IMM GRANULOCYTES NFR BLD AUTO: 0 % (ref 0–0.5)
LDLC SERPL CALC-MCNC: 89.4 MG/DL (ref 0–100)
LYMPHOCYTES # BLD: 2.6 K/UL (ref 0.8–3.5)
LYMPHOCYTES NFR BLD: 26 % (ref 12–49)
MCH RBC QN AUTO: 30.1 PG (ref 26–34)
MCHC RBC AUTO-ENTMCNC: 30.4 G/DL (ref 30–36.5)
MCV RBC AUTO: 99 FL (ref 80–99)
MONOCYTES # BLD: 1.1 K/UL (ref 0–1)
MONOCYTES NFR BLD: 11 % (ref 5–13)
NEUTS SEG # BLD: 6 K/UL (ref 1.8–8)
NEUTS SEG NFR BLD: 60 % (ref 32–75)
NRBC # BLD: 0 K/UL (ref 0–0.01)
NRBC BLD-RTO: 0 PER 100 WBC
PLATELET # BLD AUTO: 254 K/UL (ref 150–400)
PMV BLD AUTO: 13 FL (ref 8.9–12.9)
POTASSIUM SERPL-SCNC: 5.2 MMOL/L (ref 3.5–5.1)
PROT SERPL-MCNC: 8 G/DL (ref 6.4–8.2)
RBC # BLD AUTO: 3.86 M/UL (ref 3.8–5.2)
SAMPLES BEING HELD,HOLD: NORMAL
SODIUM SERPL-SCNC: 141 MMOL/L (ref 136–145)
TRIGL SERPL-MCNC: 93 MG/DL (ref ?–150)
VLDLC SERPL CALC-MCNC: 18.6 MG/DL
WBC # BLD AUTO: 9.9 K/UL (ref 3.6–11)

## 2022-06-17 RX ORDER — ACETAMINOPHEN 500 MG
500 TABLET ORAL
Qty: 30 TABLET | Refills: 0 | Status: SHIPPED | OUTPATIENT
Start: 2022-06-17

## 2022-06-17 NOTE — PROGRESS NOTES
Good news   Lipid /cholesterol levels are within normal limits  HGBA1C is excellent at 6.2 staying at prediabetes level no changes to plan of care. Metabolic panel nutrition is a factor.  Encourage heart healthy diet with small amounts of protein with each meal..  Elevated Liver phosphatase and low albumin and elevated globulin

## 2022-06-18 NOTE — PROGRESS NOTES
Subjective:     Kuldeep Thapa is a 80 y.o. female who presents today with the following:  Chief Complaint   Patient presents with    Wound Check     Legs   Diabetes and HTN labs. Patient Active Problem List   Diagnosis Code    Hypertension I10    Hypercholesterolemia E78.00    Chronic kidney disease N18.9    Type 2 diabetes with nephropathy ([facility] [place] [address] E11.21    Severe obesity (BMI 35.0-39. 9) with comorbidity ([facility]) E66.01    Pre-diabetes R73.03    Rectal bleeding K62.5    CKD (chronic kidney disease) stage 3, GFR 30-59 ml/min ([facility]) N18.30         COMPLIANT WITH MEDICATION:   HTN; Denies chest pain, dyspnea, palpitations, headache and blurred vision. Blood pressure normotensive. DM labs today    Lower extremity leg wounds. Alessandra boot bilateral to be removed today. depression screening addressed not at risk    abuse screening addressed denies    learning assessment addressed reviewed nurses notes    fall risk addressed not at risk    HM: addressed reminded to schedule eye exam today. ROS:  Gen: denies fever, chills, fatigue, weight loss, weight gain  HEENT:denies blurry vision, nasal congestion, sore throat  Resp: denies dypsnea, cough, wheezing  CV: denies chest pain radiating to the jaws or arms, palpitations, lower extremity edema  Abd: denies nausea, vomiting, diarrhea, constipation  Neuro: denies numbness/tingling  Endo: denies polyuria, polydipsia, heat/cold intolerance  Heme: no lymphadenopathy    No Known Allergies      Current Outpatient Medications:     hydroCHLOROthiazide (HYDRODIURIL) 25 mg tablet, Take 1 Tablet by mouth daily. , Disp: 30 Tablet, Rfl: 5    simvastatin (ZOCOR) 20 mg tablet, Take 1 Tablet by mouth nightly., Disp: 90 Tablet, Rfl: 1    metoprolol tartrate (LOPRESSOR) 50 mg tablet, Take 1 Tablet by mouth two (2) times a day., Disp: 60 Tablet, Rfl: 5    amLODIPine-benazepril (LOTREL) 5-10 mg per capsule, TAKE ONE CAPSULE BY MOUTH EVERY DAY (HYPERTENSION), Disp: 90 Capsule, Rfl: 1    acetaminophen (TYLENOL) 500 mg tablet, Take 1 Tablet by mouth every six (6) hours as needed for Pain., Disp: 30 Tablet, Rfl: 0    Past Medical History:   Diagnosis Date    Chronic kidney disease     Hypercholesterolemia     Hypertension        Past Surgical History:   Procedure Laterality Date    HX CATARACT REMOVAL Right 03/27/2018    HX CATARACT REMOVAL  05/2018    left    HX CYST REMOVAL  1976    ear,       Social History     Tobacco Use   Smoking Status Never Smoker   Smokeless Tobacco Never Used       Social History     Socioeconomic History    Marital status:    Tobacco Use    Smoking status: Never Smoker    Smokeless tobacco: Never Used   Substance and Sexual Activity    Alcohol use: No   Other Topics Concern     Service No    Blood Transfusions No    Caffeine Concern No    Occupational Exposure No    Hobby Hazards No    Sleep Concern No    Stress Concern No    Weight Concern No    Special Diet No    Back Care No    Exercise Yes    Bike Helmet No    Seat Belt Yes    Self-Exams Yes     Social Determinants of Health     Financial Resource Strain: Low Risk     Difficulty of Paying Living Expenses: Not hard at all   Food Insecurity: No Food Insecurity    Worried About Running Out of Food in the Last Year: Never true    Srikanth of Food in the Last Year: Never true   Transportation Needs: No Transportation Needs    Lack of Transportation (Medical): No    Lack of Transportation (Non-Medical): No       Family History   Problem Relation Age of Onset    Hypertension Mother          Objective:     Visit Vitals  /76 (BP 1 Location: Right arm, BP Patient Position: Sitting, BP Cuff Size: Adult)   Pulse 92   Temp 97.8 °F (36.6 °C) (Temporal)   Resp 17   Ht 5' 4\" (1.626 m)   Wt 162 lb (73.5 kg)   SpO2 97%   BMI 27.81 kg/m²     Body mass index is 27.81 kg/m². General: Alert and oriented. No acute distress.   Well nourished  HEENT :  Ears:TMs are normal. Canals are clear.   Eyes: pupils equal, round, react to light and accommodation. Extra ocular movements intact. Nose: patent. Mouth and throat is clear. Neck:supple full range of motion no thyromegaly. Trachea midline, No carotid bruits. No significant lymphadenopathy  Lungs[de-identified] clear to auscultation without wheezes, rales, or rhonchi. Heart :RRR, S1 & S2 are normal intensity. No murmur; no gallop  Abdomen: bowel sounds active. No tenderness, guarding, rebound, masses, hepatic or spleen enlargement  Back: no CVA tenderness. Extremities: without clubbing, cyanosis, or edema, rickey boot bandaged both lower extremities. Pulses: radial and femoral pulses are normal  Neuro: HMF intact. Cranial nerves II through XII grossly normal.  Motor: is 5 over 5 and symmetrical.   Deep tendon reflexes: +2 equal  Psych:appropriate behavior, mood, affect and judgement. Results for orders placed or performed in visit on 06/16/22   LIPID PANEL   Result Value Ref Range    Cholesterol, total 156 <200 MG/DL    Triglyceride 93 <150 MG/DL    HDL Cholesterol 48 MG/DL    LDL, calculated 89.4 0 - 100 MG/DL    VLDL, calculated 18.6 MG/DL    CHOL/HDL Ratio 3.3 0.0 - 5.0     METABOLIC PANEL, COMPREHENSIVE   Result Value Ref Range    Sodium 141 136 - 145 mmol/L    Potassium 5.2 (H) 3.5 - 5.1 mmol/L    Chloride 110 (H) 97 - 108 mmol/L    CO2 22 21 - 32 mmol/L    Anion gap 9 5 - 15 mmol/L    Glucose 78 65 - 100 mg/dL    BUN 28 (H) 6 - 20 MG/DL    Creatinine 1.78 (H) 0.55 - 1.02 MG/DL    BUN/Creatinine ratio 16 12 - 20      GFR est AA 33 (L) >60 ml/min/1.73m2    GFR est non-AA 27 (L) >60 ml/min/1.73m2    Calcium 9.5 8.5 - 10.1 MG/DL    Bilirubin, total 0.8 0.2 - 1.0 MG/DL    ALT (SGPT) 20 12 - 78 U/L    AST (SGOT) 20 15 - 37 U/L    Alk.  phosphatase 131 (H) 45 - 117 U/L    Protein, total 8.0 6.4 - 8.2 g/dL    Albumin 3.1 (L) 3.5 - 5.0 g/dL    Globulin 4.9 (H) 2.0 - 4.0 g/dL    A-G Ratio 0.6 (L) 1.1 - 2.2     HEMOGLOBIN A1C WITH EAG   Result Value Ref Range Hemoglobin A1c 6.2 (H) 4.0 - 5.6 %    Est. average glucose 131 mg/dL   CBC WITH AUTOMATED DIFF   Result Value Ref Range    WBC 9.9 3.6 - 11.0 K/uL    RBC 3.86 3.80 - 5.20 M/uL    HGB 11.6 11.5 - 16.0 g/dL    HCT 38.2 35.0 - 47.0 %    MCV 99.0 80.0 - 99.0 FL    MCH 30.1 26.0 - 34.0 PG    MCHC 30.4 30.0 - 36.5 g/dL    RDW 17.2 (H) 11.5 - 14.5 %    PLATELET 032 343 - 359 K/uL    MPV 13.0 (H) 8.9 - 12.9 FL    NRBC 0.0 0  WBC    ABSOLUTE NRBC 0.00 0.00 - 0.01 K/uL    NEUTROPHILS 60 32 - 75 %    LYMPHOCYTES 26 12 - 49 %    MONOCYTES 11 5 - 13 %    EOSINOPHILS 2 0 - 7 %    BASOPHILS 1 0 - 1 %    IMMATURE GRANULOCYTES 0 0.0 - 0.5 %    ABS. NEUTROPHILS 6.0 1.8 - 8.0 K/UL    ABS. LYMPHOCYTES 2.6 0.8 - 3.5 K/UL    ABS. MONOCYTES 1.1 (H) 0.0 - 1.0 K/UL    ABS. EOSINOPHILS 0.2 0.0 - 0.4 K/UL    ABS. BASOPHILS 0.1 0.0 - 0.1 K/UL    ABS. IMM. GRANS. 0.0 0.00 - 0.04 K/UL    DF AUTOMATED     SAMPLES BEING HELD   Result Value Ref Range    SAMPLES BEING HELD 1sst     COMMENT        Add-on orders for these samples will be processed based on acceptable specimen integrity and analyte stability, which may vary by analyte.    AMB POC URINE, MICROALBUMIN, SEMIQUANT (3 RESULTS)   Result Value Ref Range    ALBUMIN, URINE POC 80 Negative mg/L    CREATININE, URINE  mg/dL    Microalbumin/creat ratio (POC)  <30 MG/G       Results for orders placed or performed in visit on 06/16/22   LIPID PANEL   Result Value Ref Range    Cholesterol, total 156 <200 MG/DL    Triglyceride 93 <150 MG/DL    HDL Cholesterol 48 MG/DL    LDL, calculated 89.4 0 - 100 MG/DL    VLDL, calculated 18.6 MG/DL    CHOL/HDL Ratio 3.3 0.0 - 5.0     METABOLIC PANEL, COMPREHENSIVE   Result Value Ref Range    Sodium 141 136 - 145 mmol/L    Potassium 5.2 (H) 3.5 - 5.1 mmol/L    Chloride 110 (H) 97 - 108 mmol/L    CO2 22 21 - 32 mmol/L    Anion gap 9 5 - 15 mmol/L    Glucose 78 65 - 100 mg/dL    BUN 28 (H) 6 - 20 MG/DL    Creatinine 1.78 (H) 0.55 - 1.02 MG/DL BUN/Creatinine ratio 16 12 - 20      GFR est AA 33 (L) >60 ml/min/1.73m2    GFR est non-AA 27 (L) >60 ml/min/1.73m2    Calcium 9.5 8.5 - 10.1 MG/DL    Bilirubin, total 0.8 0.2 - 1.0 MG/DL    ALT (SGPT) 20 12 - 78 U/L    AST (SGOT) 20 15 - 37 U/L    Alk. phosphatase 131 (H) 45 - 117 U/L    Protein, total 8.0 6.4 - 8.2 g/dL    Albumin 3.1 (L) 3.5 - 5.0 g/dL    Globulin 4.9 (H) 2.0 - 4.0 g/dL    A-G Ratio 0.6 (L) 1.1 - 2.2     HEMOGLOBIN A1C WITH EAG   Result Value Ref Range    Hemoglobin A1c 6.2 (H) 4.0 - 5.6 %    Est. average glucose 131 mg/dL   CBC WITH AUTOMATED DIFF   Result Value Ref Range    WBC 9.9 3.6 - 11.0 K/uL    RBC 3.86 3.80 - 5.20 M/uL    HGB 11.6 11.5 - 16.0 g/dL    HCT 38.2 35.0 - 47.0 %    MCV 99.0 80.0 - 99.0 FL    MCH 30.1 26.0 - 34.0 PG    MCHC 30.4 30.0 - 36.5 g/dL    RDW 17.2 (H) 11.5 - 14.5 %    PLATELET 780 078 - 151 K/uL    MPV 13.0 (H) 8.9 - 12.9 FL    NRBC 0.0 0  WBC    ABSOLUTE NRBC 0.00 0.00 - 0.01 K/uL    NEUTROPHILS 60 32 - 75 %    LYMPHOCYTES 26 12 - 49 %    MONOCYTES 11 5 - 13 %    EOSINOPHILS 2 0 - 7 %    BASOPHILS 1 0 - 1 %    IMMATURE GRANULOCYTES 0 0.0 - 0.5 %    ABS. NEUTROPHILS 6.0 1.8 - 8.0 K/UL    ABS. LYMPHOCYTES 2.6 0.8 - 3.5 K/UL    ABS. MONOCYTES 1.1 (H) 0.0 - 1.0 K/UL    ABS. EOSINOPHILS 0.2 0.0 - 0.4 K/UL    ABS. BASOPHILS 0.1 0.0 - 0.1 K/UL    ABS. IMM. GRANS. 0.0 0.00 - 0.04 K/UL    DF AUTOMATED     SAMPLES BEING HELD   Result Value Ref Range    SAMPLES BEING HELD 1sst     COMMENT        Add-on orders for these samples will be processed based on acceptable specimen integrity and analyte stability, which may vary by analyte. AMB POC URINE, MICROALBUMIN, SEMIQUANT (3 RESULTS)   Result Value Ref Range    ALBUMIN, URINE POC 80 Negative mg/L    CREATININE, URINE  mg/dL    Microalbumin/creat ratio (POC)  <30 MG/G       Assessment/ Plan:     1.  Type 2 diabetes with nephropathy (HCC)    - AMB POC URINE, MICROALBUMIN, SEMIQUANT (3 RESULTS)  - CBC WITH AUTOMATED DIFF; Future  - HEMOGLOBIN A1C WITH EAG; Future  - METABOLIC PANEL, COMPREHENSIVE; Future  - LIPID PANEL; Future  - LIPID PANEL  - METABOLIC PANEL, COMPREHENSIVE  - HEMOGLOBIN A1C WITH EAG  - CBC WITH AUTOMATED DIFF    2. Leg wound, left, subsequent encounter  ALESSANDRA boot removed     - AMB POC URINE, MICROALBUMIN, SEMIQUANT (3 RESULTS)  - CBC WITH AUTOMATED DIFF; Future  - HEMOGLOBIN A1C WITH EAG; Future  - METABOLIC PANEL, COMPREHENSIVE; Future  - METABOLIC PANEL, COMPREHENSIVE  - HEMOGLOBIN A1C WITH EAG  - CBC WITH AUTOMATED DIFF    3. Leg wound, right, subsequent encounter  Alessandra boot removed keep area clean   Wash with mild soap and water   Cervae to [employer] skin  - AMB POC URINE, MICROALBUMIN, SEMIQUANT (3 RESULTS)  - CBC WITH AUTOMATED DIFF; Future  - HEMOGLOBIN A1C WITH EAG; Future  - METABOLIC PANEL, COMPREHENSIVE; Future  - METABOLIC PANEL, COMPREHENSIVE  - HEMOGLOBIN A1C WITH EAG  - CBC WITH AUTOMATED DIFF    4. Essential hypertension  BP in goal   - AMB POC URINE, MICROALBUMIN, SEMIQUANT (3 RESULTS)  - CBC WITH AUTOMATED DIFF; Future  - HEMOGLOBIN A1C WITH EAG; Future  - METABOLIC PANEL, COMPREHENSIVE; Future  - LIPID PANEL; Future  - hydroCHLOROthiazide (HYDRODIURIL) 25 mg tablet; Take 1 Tablet by mouth daily. Dispense: 30 Tablet; Refill: 5  - metoprolol tartrate (LOPRESSOR) 50 mg tablet; Take 1 Tablet by mouth two (2) times a day. Dispense: 60 Tablet; Refill: 5  - amLODIPine-benazepril (LOTREL) 5-10 mg per capsule; TAKE ONE CAPSULE BY MOUTH EVERY DAY (HYPERTENSION)  Dispense: 90 Capsule; Refill: 1  - LIPID PANEL  - METABOLIC PANEL, COMPREHENSIVE  - HEMOGLOBIN A1C WITH EAG  - CBC WITH AUTOMATED DIFF    5. Hypercholesterolemia  BP in goal   Continue simvastatin 20 mg daily as prescribed  - AMB POC URINE, MICROALBUMIN, SEMIQUANT (3 RESULTS)  - CBC WITH AUTOMATED DIFF; Future  - HEMOGLOBIN A1C WITH EAG; Future  - METABOLIC PANEL, COMPREHENSIVE; Future  - LIPID PANEL;  Future  - simvastatin (ZOCOR) 20 mg tablet; Take 1 Tablet by mouth nightly. Dispense: 90 Tablet; Refill: 1  - LIPID PANEL  - METABOLIC PANEL, COMPREHENSIVE  - HEMOGLOBIN A1C WITH EAG  - CBC WITH AUTOMATED DIFF    6. PVD (peripheral vascular disease) ([facility] [address]  Wound care reviewed with daughter and patient as noted above. - AMB POC URINE, MICROALBUMIN, SEMIQUANT (3 RESULTS)  - CBC WITH AUTOMATED DIFF; Future  - HEMOGLOBIN A1C WITH EAG; Future  - METABOLIC PANEL, COMPREHENSIVE; Future  - METABOLIC PANEL, COMPREHENSIVE  - HEMOGLOBIN A1C WITH EAG  - CBC WITH AUTOMATED DIFF      Orders Placed This Encounter    CBC WITH AUTOMATED DIFF     Standing Status:   Future     Number of Occurrences:   1     Standing Expiration Date:   7/16/2022    HEMOGLOBIN A1C WITH EAG     Standing Status:   Future     Number of Occurrences:   1     Standing Expiration Date:   7/02/1590    METABOLIC PANEL, COMPREHENSIVE     Standing Status:   Future     Number of Occurrences:   1     Standing Expiration Date:   7/16/2022    LIPID PANEL     Standing Status:   Future     Number of Occurrences:   1     Standing Expiration Date:   7/16/2022    SAMPLES BEING HELD     ATLASSITEID:[id number]    AMB POC URINE, MICROALBUMIN, SEMIQUANT (3 RESULTS)    hydroCHLOROthiazide (HYDRODIURIL) 25 mg tablet     Sig: Take 1 Tablet by mouth daily. Dispense:  30 Tablet     Refill:  5    simvastatin (ZOCOR) 20 mg tablet     Sig: Take 1 Tablet by mouth nightly. Dispense:  90 Tablet     Refill:  1    metoprolol tartrate (LOPRESSOR) 50 mg tablet     Sig: Take 1 Tablet by mouth two (2) times a day. Dispense:  60 Tablet     Refill:  5    amLODIPine-benazepril (LOTREL) 5-10 mg per capsule     Sig: TAKE ONE CAPSULE BY MOUTH EVERY DAY (HYPERTENSION)     Dispense:  90 Capsule     Refill:  1         Verbal and written instructions (see AVS) provided. Patient expresses understanding of diagnosis and treatment plan.     Health Maintenance Due   Topic Date Due    Eye Exam Retinal or Dilated  Never done               JOSE FariaC

## 2022-06-18 NOTE — PROGRESS NOTES
Subjective:      Aysha Singleton is a 80 y.o. female who presents today for wound check. She has a bilateral PVD with ulcerations  wound which is located on the ulcers on right heel and left lower leg dimes size last week. Wrapped in Elizabeth boot x 1 week. She presents today with her adult daughter . Misael Awkward Current symptoms: wound healing as expected. Interventions to date: dressing changed 7 days ago. Alessandra boot dressings applied. Objective:     Visit Vitals  /76 (BP 1 Location: Right arm, BP Patient Position: Sitting, BP Cuff Size: Adult)   Pulse 92   Temp 97.8 °F (36.6 °C) (Temporal)   Resp 17   Ht 5' 4\" (1.626 m)   Wt 162 lb (73.5 kg)   SpO2 97%   BMI 27.81 kg/m²       Wound:   wound margins intact and healing well. No signs of infection. appears improved compared to last recheck  Ulcers healed 1 small skin tear back or right heel     Assessment:     Wound check. Interventions today removal of existing dressing    visual inspection  cleansing with saline solution solution. Plan:     1. Discussed appropriate home care of this wound. , wash with soap and water with mild soap and ceravae for  motiturizing   Follow up in 2 weeks. Brenda POOLE   2. Patient instructions were given. 3. Follow up: 2 weeks.   Brenda Rivera NP-C

## 2022-06-24 ENCOUNTER — APPOINTMENT (OUTPATIENT)
Dept: GENERAL RADIOLOGY | Age: 87
End: 2022-06-24
Attending: EMERGENCY MEDICINE
Payer: MEDICARE

## 2022-06-24 ENCOUNTER — HOSPITAL ENCOUNTER (EMERGENCY)
Age: 87
Discharge: HOME | End: 2022-06-24
Attending: EMERGENCY MEDICINE
Payer: MEDICARE

## 2022-06-24 ENCOUNTER — APPOINTMENT (OUTPATIENT)
Dept: CT IMAGING | Age: 87
End: 2022-06-24
Attending: EMERGENCY MEDICINE
Payer: MEDICARE

## 2022-06-24 VITALS
RESPIRATION RATE: 18 BRPM | OXYGEN SATURATION: 98 % | SYSTOLIC BLOOD PRESSURE: 135 MMHG | HEIGHT: 64 IN | BODY MASS INDEX: 27.66 KG/M2 | TEMPERATURE: 98.1 F | HEART RATE: 65 BPM | WEIGHT: 162 LBS | DIASTOLIC BLOOD PRESSURE: 65 MMHG

## 2022-06-24 DIAGNOSIS — S12.000A CLOSED DISPLACED FRACTURE OF FIRST CERVICAL VERTEBRA, UNSPECIFIED FRACTURE MORPHOLOGY, INITIAL ENCOUNTER (HCC): Primary | ICD-10-CM

## 2022-06-24 DIAGNOSIS — N18.9 CHRONIC KIDNEY DISEASE, UNSPECIFIED CKD STAGE: ICD-10-CM

## 2022-06-24 LAB
ALBUMIN SERPL-MCNC: 2.8 G/DL (ref 3.5–5)
ALBUMIN/GLOB SERPL: 0.5 {RATIO} (ref 1.1–2.2)
ALP SERPL-CCNC: 134 U/L (ref 45–117)
ALT SERPL-CCNC: 20 U/L (ref 12–78)
ANION GAP SERPL CALC-SCNC: 12 MMOL/L (ref 5–15)
APTT PPP: 25.4 SEC (ref 22.1–31)
AST SERPL-CCNC: 27 U/L (ref 15–37)
BASOPHILS # BLD: 0.1 K/UL (ref 0–0.1)
BASOPHILS NFR BLD: 1 % (ref 0–1)
BILIRUB SERPL-MCNC: 0.5 MG/DL (ref 0.2–1)
BUN SERPL-MCNC: 35 MG/DL (ref 6–20)
BUN/CREAT SERPL: 17 (ref 12–20)
CALCIUM SERPL-MCNC: 9.4 MG/DL (ref 8.5–10.1)
CHLORIDE SERPL-SCNC: 107 MMOL/L (ref 97–108)
CO2 SERPL-SCNC: 22 MMOL/L (ref 21–32)
CREAT SERPL-MCNC: 2.08 MG/DL (ref 0.55–1.02)
DIFFERENTIAL METHOD BLD: ABNORMAL
EOSINOPHIL # BLD: 0.2 K/UL (ref 0–0.4)
EOSINOPHIL NFR BLD: 1 % (ref 0–7)
ERYTHROCYTE [DISTWIDTH] IN BLOOD BY AUTOMATED COUNT: 15.8 % (ref 11.5–14.5)
GLOBULIN SER CALC-MCNC: 5.6 G/DL (ref 2–4)
GLUCOSE SERPL-MCNC: 112 MG/DL (ref 65–100)
HCT VFR BLD AUTO: 35.2 % (ref 35–47)
HGB BLD-MCNC: 11 G/DL (ref 11.5–16)
IMM GRANULOCYTES # BLD AUTO: 0 K/UL (ref 0–0.04)
IMM GRANULOCYTES NFR BLD AUTO: 0 % (ref 0–0.5)
INR PPP: 1 (ref 0.9–1.1)
LACTATE SERPL-SCNC: 3.2 MMOL/L (ref 0.4–2)
LYMPHOCYTES # BLD: 1.7 K/UL (ref 0.8–3.5)
LYMPHOCYTES NFR BLD: 16 % (ref 12–49)
MCH RBC QN AUTO: 29.3 PG (ref 26–34)
MCHC RBC AUTO-ENTMCNC: 31.3 G/DL (ref 30–36.5)
MCV RBC AUTO: 93.6 FL (ref 80–99)
MONOCYTES # BLD: 1 K/UL (ref 0–1)
MONOCYTES NFR BLD: 9 % (ref 5–13)
NEUTS SEG # BLD: 7.8 K/UL (ref 1.8–8)
NEUTS SEG NFR BLD: 73 % (ref 32–75)
NRBC # BLD: 0 K/UL (ref 0–0.01)
NRBC BLD-RTO: 0 PER 100 WBC
PLATELET # BLD AUTO: 228 K/UL (ref 150–400)
PMV BLD AUTO: 10.7 FL (ref 8.9–12.9)
POTASSIUM SERPL-SCNC: 5 MMOL/L (ref 3.5–5.1)
PROT SERPL-MCNC: 8.4 G/DL (ref 6.4–8.2)
PROTHROMBIN TIME: 10.3 SEC (ref 9–11.1)
RBC # BLD AUTO: 3.76 M/UL (ref 3.8–5.2)
SODIUM SERPL-SCNC: 141 MMOL/L (ref 136–145)
THERAPEUTIC RANGE,PTTT: NORMAL SECS (ref 58–77)
WBC # BLD AUTO: 10.8 K/UL (ref 3.6–11)

## 2022-06-24 PROCEDURE — 96374 THER/PROPH/DIAG INJ IV PUSH: CPT

## 2022-06-24 PROCEDURE — 72125 CT NECK SPINE W/O DYE: CPT

## 2022-06-24 PROCEDURE — 83605 ASSAY OF LACTIC ACID: CPT

## 2022-06-24 PROCEDURE — 36415 COLL VENOUS BLD VENIPUNCTURE: CPT

## 2022-06-24 PROCEDURE — 85025 COMPLETE CBC W/AUTO DIFF WBC: CPT

## 2022-06-24 PROCEDURE — 70486 CT MAXILLOFACIAL W/O DYE: CPT

## 2022-06-24 PROCEDURE — 96376 TX/PRO/DX INJ SAME DRUG ADON: CPT

## 2022-06-24 PROCEDURE — 85610 PROTHROMBIN TIME: CPT

## 2022-06-24 PROCEDURE — 85730 THROMBOPLASTIN TIME PARTIAL: CPT

## 2022-06-24 PROCEDURE — 99285 EMERGENCY DEPT VISIT HI MDM: CPT

## 2022-06-24 PROCEDURE — 74011250636 HC RX REV CODE- 250/636: Performed by: EMERGENCY MEDICINE

## 2022-06-24 PROCEDURE — 70450 CT HEAD/BRAIN W/O DYE: CPT

## 2022-06-24 PROCEDURE — 71045 X-RAY EXAM CHEST 1 VIEW: CPT

## 2022-06-24 PROCEDURE — 80053 COMPREHEN METABOLIC PANEL: CPT

## 2022-06-24 RX ORDER — SODIUM CHLORIDE 9 MG/ML
75 INJECTION, SOLUTION INTRAVENOUS CONTINUOUS
Status: DISCONTINUED | OUTPATIENT
Start: 2022-06-24 | End: 2022-06-24 | Stop reason: HOSPADM

## 2022-06-24 RX ORDER — FENTANYL CITRATE 50 UG/ML
50 INJECTION, SOLUTION INTRAMUSCULAR; INTRAVENOUS
Status: COMPLETED | OUTPATIENT
Start: 2022-06-24 | End: 2022-06-24

## 2022-06-24 RX ADMIN — SODIUM CHLORIDE 75 ML/HR: 9 INJECTION, SOLUTION INTRAVENOUS at 16:40

## 2022-06-24 RX ADMIN — FENTANYL CITRATE 50 MCG: 50 INJECTION, SOLUTION INTRAMUSCULAR; INTRAVENOUS at 15:02

## 2022-06-24 RX ADMIN — FENTANYL CITRATE 50 MCG: 50 INJECTION, SOLUTION INTRAMUSCULAR; INTRAVENOUS at 17:23

## 2022-06-24 NOTE — ED TRIAGE NOTES
Getting up from motorized chair to go to the BR and woke up on the floor .  Mouth and nose bleeding c/o HA , neck pain and bilat arm pain

## 2022-06-24 NOTE — ED PROVIDER NOTES
EMERGENCY DEPARTMENT HISTORY AND PHYSICAL EXAM      Date: 6/24/2022  Patient Name: Linwood Michaels    History of Presenting Illness     Chief Complaint   Patient presents with    Fall       History Provided By: Patient    HPI: Linwood Michaels, 80 y.o. female with PMHx significant for hypertension, high cholesterol, CKD, presents via EMS to the ED with cc of fall. She remembers getting up and having breakfast this morning. She was going to the bathroom in her motorized chair. She apparently went to stand up with her cane and fell forward and landed on her face. She complains of pain in her face as well as her neck and complains of some tingling in her bilateral upper extremities. No incontinence. No lower extremity weakness. No other injuries noted. Unknown how long she was on the floor. EMS placed in c-collar prior to arrival.    Is not on any anticoagulants. Unknown loss of consciousness. PMHx: Significant for hypertension, high cholesterol, CKD  PSHx: Significant for cataract surgery. Social Hx: Non-smoker. There are no other complaints, changes, or physical findings at this time. PCP: Cuba Patino NP    No current facility-administered medications on file prior to encounter. Current Outpatient Medications on File Prior to Encounter   Medication Sig Dispense Refill    acetaminophen (TYLENOL) 500 mg tablet Take 1 Tablet by mouth every six (6) hours as needed for Pain. 30 Tablet 0    hydroCHLOROthiazide (HYDRODIURIL) 25 mg tablet Take 1 Tablet by mouth daily. 30 Tablet 5    simvastatin (ZOCOR) 20 mg tablet Take 1 Tablet by mouth nightly. 90 Tablet 1    metoprolol tartrate (LOPRESSOR) 50 mg tablet Take 1 Tablet by mouth two (2) times a day.  60 Tablet 5    amLODIPine-benazepril (LOTREL) 5-10 mg per capsule TAKE ONE CAPSULE BY MOUTH EVERY DAY (HYPERTENSION) 90 Capsule 1       Past History     Past Medical History:  Past Medical History:   Diagnosis Date    Chronic kidney disease     Hypercholesterolemia     Hypertension        Past Surgical History:  Past Surgical History:   Procedure Laterality Date    HX CATARACT REMOVAL Right 03/27/2018    HX CATARACT REMOVAL  05/2018    left    HX CYST REMOVAL  1976    ear,       Family History:  Family History   Problem Relation Age of Onset    Hypertension Mother        Social History:  Social History     Tobacco Use    Smoking status: Never Smoker    Smokeless tobacco: Never Used   Substance Use Topics    Alcohol use: No    Drug use: Not on file       Allergies:  No Known Allergies      Review of Systems   Review of Systems   Constitutional: Negative for activity change, chills and fever. HENT: Negative for congestion and sore throat. Eyes: Negative for pain and redness. Respiratory: Negative for cough, chest tightness and shortness of breath. Cardiovascular: Negative for chest pain and palpitations. Gastrointestinal: Negative for abdominal pain, diarrhea, nausea and vomiting. Genitourinary: Negative for dysuria, frequency and urgency. Musculoskeletal: Positive for neck pain. Negative for back pain. Skin: Negative for rash. Neurological: Positive for headaches. Negative for syncope and light-headedness. Psychiatric/Behavioral: Negative for confusion. All other systems reviewed and are negative. Physical Exam   Physical Exam  Vitals and nursing note reviewed. Constitutional:       General: She is not in acute distress. Appearance: Normal appearance. She is well-developed. She is not ill-appearing or diaphoretic. HENT:      Head: Normocephalic. Right Ear: Tympanic membrane normal.      Left Ear: Tympanic membrane normal.   Eyes:      General: No scleral icterus. Conjunctiva/sclera: Conjunctivae normal.      Pupils: Pupils are equal, round, and reactive to light. Neck:      Comments: In c-collar PTA  Cardiovascular:      Rate and Rhythm: Normal rate and regular rhythm. Pulses: Normal pulses. Heart sounds: Normal heart sounds. Pulmonary:      Effort: Pulmonary effort is normal.      Breath sounds: Normal breath sounds. Musculoskeletal:         General: Normal range of motion. Comments: 2+ pitting edema to midshin bilaterally. Chronic stasis changes bilaterally. Skin:     General: Skin is warm and dry. Findings: No rash. Neurological:      General: No focal deficit present. Mental Status: She is alert and oriented to person, place, and time. Comments: Station intact to light touch in bilateral upper extremities. Good equal  strength bilaterally. Psychiatric:         Behavior: Behavior normal.             Diagnostic Study Results     Labs -     Recent Results (from the past 12 hour(s))   CBC WITH AUTOMATED DIFF    Collection Time: 06/24/22  3:34 PM   Result Value Ref Range    WBC 10.8 3.6 - 11.0 K/uL    RBC 3.76 (L) 3.80 - 5.20 M/uL    HGB 11.0 (L) 11.5 - 16.0 g/dL    HCT 35.2 35.0 - 47.0 %    MCV 93.6 80.0 - 99.0 FL    MCH 29.3 26.0 - 34.0 PG    MCHC 31.3 30.0 - 36.5 g/dL    RDW 15.8 (H) 11.5 - 14.5 %    PLATELET 556 268 - 278 K/uL    MPV 10.7 8.9 - 12.9 FL    NRBC 0.0 0  WBC    ABSOLUTE NRBC 0.00 0.00 - 0.01 K/uL    NEUTROPHILS 73 32 - 75 %    LYMPHOCYTES 16 12 - 49 %    MONOCYTES 9 5 - 13 %    EOSINOPHILS 1 0 - 7 %    BASOPHILS 1 0 - 1 %    IMMATURE GRANULOCYTES 0 0.0 - 0.5 %    ABS. NEUTROPHILS 7.8 1.8 - 8.0 K/UL    ABS. LYMPHOCYTES 1.7 0.8 - 3.5 K/UL    ABS. MONOCYTES 1.0 0.0 - 1.0 K/UL    ABS. EOSINOPHILS 0.2 0.0 - 0.4 K/UL    ABS. BASOPHILS 0.1 0.0 - 0.1 K/UL    ABS. IMM.  GRANS. 0.0 0.00 - 0.04 K/UL    DF AUTOMATED     METABOLIC PANEL, COMPREHENSIVE    Collection Time: 06/24/22  3:34 PM   Result Value Ref Range    Sodium 141 136 - 145 mmol/L    Potassium 5.0 3.5 - 5.1 mmol/L    Chloride 107 97 - 108 mmol/L    CO2 22 21 - 32 mmol/L    Anion gap 12 5 - 15 mmol/L    Glucose 112 (H) 65 - 100 mg/dL    BUN 35 (H) 6 - 20 MG/DL    Creatinine 2.08 (H) 0.55 - 1.02 MG/DL    BUN/Creatinine ratio 17 12 - 20      GFR est AA 27 (L) >60 ml/min/1.73m2    GFR est non-AA 22 (L) >60 ml/min/1.73m2    Calcium 9.4 8.5 - 10.1 MG/DL    Bilirubin, total 0.5 0.2 - 1.0 MG/DL    ALT (SGPT) 20 12 - 78 U/L    AST (SGOT) 27 15 - 37 U/L    Alk. phosphatase 134 (H) 45 - 117 U/L    Protein, total 8.4 (H) 6.4 - 8.2 g/dL    Albumin 2.8 (L) 3.5 - 5.0 g/dL    Globulin 5.6 (H) 2.0 - 4.0 g/dL    A-G Ratio 0.5 (L) 1.1 - 2.2     PROTHROMBIN TIME + INR    Collection Time: 06/24/22  3:34 PM   Result Value Ref Range    INR 1.0 0.9 - 1.1      Prothrombin time 10.3 9.0 - 11.1 sec   PTT    Collection Time: 06/24/22  3:34 PM   Result Value Ref Range    aPTT 25.4 22.1 - 31.0 sec    aPTT, therapeutic range     58.0 - 77.0 SECS   LACTIC ACID    Collection Time: 06/24/22  3:34 PM   Result Value Ref Range    Lactic acid 3.2 (HH) 0.4 - 2.0 MMOL/L       Radiologic Studies -   XR CHEST PORT   Final Result   No acute process. CT MAXILLOFACIAL WO CONT   Final Result   Acute fractures of the bilateral C1 transverse processes. Given that the foramen   transversarium is involved recommend CTA of the neck to evaluate for vertebral   artery injury      CT HEAD WO CONT   Final Result   1. Scalp hematoma. No acute intracranial abnormality   2. Acute fracture of the bilateral C1 transverse processes      Findings phoned to the  in the emergency department at 1505 hours            CT SPINE CERV WO CONT   Final Result   1. Acute fracture through the transverse processes of C1. Given the involvement   of the foramen transversarium CT of the neck is recommended   2. Additional nondisplaced fractures of the spinous processes of C2-C5. CT Results  (Last 48 hours)               06/24/22 1437  CT MAXILLOFACIAL WO CONT Final result    Impression:  Acute fractures of the bilateral C1 transverse processes.  Given that the foramen   transversarium is involved recommend CTA of the neck to evaluate for vertebral   artery injury       Narrative:  EXAM: CT MAXILLOFACIAL WO CONT       INDICATION: trauma       COMPARISON: None. CONTRAST:   None. TECHNIQUE:  Multislice helical CT of the facial bones was performed in the axial   plane without intravenous contrast administration. Coronal and sagittal   reformations were generated. CT dose reduction was achieved through use of a   standardized protocol tailored for this examination and automatic exposure   control for dose modulation. FINDINGS:       Bones: There are acute transverse process fractures of C1. Right-sided fracture   is displaced posteriorly right fracture extends through the foramen   transversarium. Multiple periapical lucencies are noted the right posterior   maxillary molar is fractured. This may be chronic. Paranasal sinuses: Sinus mucosal inflammatory change left maxillary sinus       Orbits: The globes, optic nerves, and extraocular muscles are within normal   limits. Base of brain and soft tissues: Within normal limits. No evidence of mass. Miscellaneous: N/A            06/24/22 1437  CT HEAD WO CONT Final result    Impression:  1. Scalp hematoma. No acute intracranial abnormality   2. Acute fracture of the bilateral C1 transverse processes       Findings phoned to the  in the emergency department at 1505 hours               Narrative:  EXAM: CT HEAD WO CONT       INDICATION: trauma/fall       COMPARISON: None. CONTRAST: None. TECHNIQUE: Unenhanced CT of the head was performed using 5 mm images. Brain and   bone windows were generated. Coronal and sagittal reformats. CT dose reduction   was achieved through use of a standardized protocol tailored for this   examination and automatic exposure control for dose modulation. FINDINGS:   The ventricles and sulci are normal in size, shape and configuration. . Moderate   low density periventricular white matter.  There is no intracranial hemorrhage, extra-axial collection, or mass effect. The basilar cisterns are open. No CT   evidence of acute infarct. The bone windows demonstrate acute fracture of the bilateral transverse   processes of C1. This will be better evaluated at spine CT. Chronic sinus   mucosal inflammatory change left maxillary sinus. Scalp hematoma at the vertex           06/24/22 1437  CT SPINE CERV WO CONT Final result    Impression:  1. Acute fracture through the transverse processes of C1. Given the involvement   of the foramen transversarium CT of the neck is recommended   2. Additional nondisplaced fractures of the spinous processes of C2-C5. Narrative:  INDICATION:  fall        STUDY:  CT SPINE CERV WO CONT        Examination: Cervical spine CT. No contrast or comparisons. Thin section axial   images were obtained with sagittal and coronal reformats. CT dose reduction was   achieved through use of a standardized protocol tailored for this examination   and automatic exposure control for dose modulation. FINDINGS: Alignment of the cervical spine is normal. There are acute fractures   of the bilateral transverse processes of C1. Fracture on the right extends   through the foramen transversarium. Fracture on the left extends to the lateral   margin of the foramen transversarium. Fracture extends distally through the   spinous process of C2 on the left and through the spinous process of C3 and C4   possible involvement of the spinous process of C5. Maliha Friend There are vascular   calcifications. 1.7 cm left thyroid nodule. .               CXR Results  (Last 48 hours)               06/24/22 1528  XR CHEST PORT Final result    Impression:  No acute process. Narrative: Indication: Chest pain       Comparison: None       Portable exam of the chest obtained at 1518 demonstrates cardiomegaly. There is   no acute process in the lung fields. Degenerative changes are seen in the   thoracic spine.                    Medical Decision Making   I am the first provider for this patient. I reviewed the vital signs, available nursing notes, past medical history, past surgical history, family history and social history. Vital Signs-Reviewed the patient's vital signs. Patient Vitals for the past 12 hrs:   Temp Pulse Resp BP SpO2   06/24/22 1711 -- 65 18 135/65 98 %   06/24/22 1623 -- 92 18 -- 98 %   06/24/22 1432 98.1 °F (36.7 °C) -- -- -- --   06/24/22 1428 -- 94 18 (!) 149/65 98 %       Pulse Oximetry Analysis - 98% on RA    Cardiac Monitor:   Rate: 65 bpm  Rhythm: Normal Sinus Rhythm            Records Reviewed: Nursing Notes and Old Medical Records    Provider Notes (Medical Decision Making):   DDx: ICH, C-spine fracture. Ischial fracture. ED Course:   Initial assessment performed. The patients presenting problems have been discussed, and they are in agreement with the care plan formulated and outlined with them. I have encouraged them to ask questions as they arise throughout their visit. ED Course as of 06/24/22 1844 Fri Jun 24, 2022   1521 Patient with bilateral C1 transverse process fractures with fracture through the foramen transversarium. Already in c-collar. No neurodeficits although patient does have some tingling in her bilateral upper extremities. Spoke with Dr. Mechelle Sawyer, ER attending at [facility]. He kindly accepted the patient as transfer to [facility] emergency department. [CH]      ED Course User Index  [CH] Day Mora MD                 Critical Care Time:   0    Disposition:  Transfer to [facility] emergency department    PLAN:  1. Current Discharge Medication List        2. Follow-up Information    None       Return to ED if worse     Diagnosis     Clinical Impression:   1. Closed displaced fracture of first cervical vertebra, unspecified fracture morphology, initial encounter ([facility] [place] [address]    2.  Chronic kidney disease, unspecified CKD stage              Please note that this dictation was completed with [employer] computer voice recognition software. Quite often unanticipated grammatical, syntax, homophones, and other interpretive errors are inadvertently transcribed by the computer software. Please disregard these errors. Please excuse any errors that have escaped final proofreading.

## 2022-06-24 NOTE — PROGRESS NOTES
1527-- Writer contacted [facility] and spoke with Rob to arrange ALS transport for this patient to [facility] ED. Requested information provided (Dx, wt, ht, CM, room air, saline lock, no isolation precautions and insurance information given) ETA 2200-- Asked if this writer could speak to a supervisor to see if a sooner ETA could be obtained and was informed after being placed on a brief hold that the ETA this writer was given was the best they could do at this time. [address]-- Writer contacted the [facility] supervisor, James muniz, and inquired if there is any possibility of having a crew here earlier than 2200 to transport this patient. Informed that the longer ETA was due to needing an ALS truck. Writer called the ED physician and received the okay for patient to be transported via BLS and informed Kimani De La Torre. Dispatch to call back with an ETA.     1607--Pat from [facility] called this writer back with an ETA of 1900-- LISA Castillo RN made aware.

## 2022-08-30 ENCOUNTER — OFFICE VISIT (OUTPATIENT)
Dept: FAMILY MEDICINE CLINIC | Age: 87
End: 2022-08-30
Payer: MEDICARE

## 2022-08-30 DIAGNOSIS — I10 ESSENTIAL HYPERTENSION: Primary | ICD-10-CM

## 2022-08-30 DIAGNOSIS — N18.4 CKD (CHRONIC KIDNEY DISEASE) STAGE 4, GFR 15-29 ML/MIN (HCC): ICD-10-CM
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
## 2022-08-30 PROCEDURE — 99214 OFFICE O/P EST MOD 30 MIN: CPT | Performed by: NURSE PRACTITIONER

## 2022-08-30 RX ORDER — AMLODIPINE BESYLATE 5 MG/1
TABLET ORAL DAILY
COMMUNITY
Start: 2022-08-10 | End: 2022-08-31 | Stop reason: SDUPTHER

## 2022-08-30 RX ORDER — POLYETHYLENE GLYCOL 3350 17 G/17G
POWDER, FOR SOLUTION ORAL
COMMUNITY
Start: 2022-08-10

## 2022-08-30 RX ORDER — METOPROLOL TARTRATE 25 MG/1
25 TABLET, FILM COATED ORAL 2 TIMES DAILY
COMMUNITY
Start: 2022-08-10

## 2022-08-30 RX ORDER — METHOCARBAMOL 500 MG/1
500 TABLET, FILM COATED ORAL 4 TIMES DAILY
COMMUNITY
Start: 2022-08-10

## 2022-08-30 RX ORDER — BISMUTH SUBSALICYLATE 262 MG
1 TABLET,CHEWABLE ORAL DAILY
COMMUNITY

## 2022-08-30 NOTE — PROGRESS NOTES
1. \"Have you been to the ER, urgent care clinic since your last visit? Hospitalized since your last visit? \"  Yes [facility] fall and neck injury from [facility] [address], [address] then TO Rehab    2. \"Have you seen or consulted any other health care providers outside of the [address] [place] [address] since your last visit? \" No     3. For patients aged 39-70: Has the patient had a colonoscopy / FIT/ Cologuard? NA - based on age      If the patient is female:    4. For patients aged 41-77: Has the patient had a mammogram within the past 2 years? NA - based on age or sex      11. For patients aged 21-65: Has the patient had a pap smear?  NA - based on age or sex

## 2022-08-31 VITALS
WEIGHT: 168 LBS | HEIGHT: 64 IN | BODY MASS INDEX: 28.68 KG/M2 | SYSTOLIC BLOOD PRESSURE: 132 MMHG | RESPIRATION RATE: 20 BRPM | OXYGEN SATURATION: 99 % | HEART RATE: 84 BPM | DIASTOLIC BLOOD PRESSURE: 76 MMHG | TEMPERATURE: 96.8 F

## 2022-08-31 PROBLEM — N18.4 CKD (CHRONIC KIDNEY DISEASE) STAGE 4, GFR 15-29 ML/MIN (HCC): Status: ACTIVE | Noted: 2022-08-31

## 2022-08-31 RX ORDER — AMLODIPINE BESYLATE 5 MG/1
5 TABLET ORAL DAILY
Qty: 90 TABLET | Refills: 1 | Status: SHIPPED | OUTPATIENT
Start: 2022-08-31

## 2022-08-31 NOTE — ACP (ADVANCE CARE PLANNING)
Has advanced medical directive scanned into chart. Reviewed at this visit. No changes. Michael POOLE

## 2022-08-31 NOTE — PROGRESS NOTES
Subjective:     Cristal Lee is a 80 y.o. female who presents today with the following:  Chief Complaint   Patient presents with    Hypertension    Fall     In July, follow up Hospital and Rehab        Patient Active Problem List   Diagnosis Code    Hypertension I10    Hypercholesterolemia E78.00    Chronic kidney disease N18.9    Type 2 diabetes with nephropathy ([facility] [address] E11.21    Severe obesity (BMI 35.0-39. 9) with comorbidity ([facility]) E66.01    Pre-diabetes R73.03    Rectal bleeding K62.5    CKD (chronic kidney disease) stage 3, GFR 30-59 ml/min ([facility]) N18.30    CKD (chronic kidney disease) stage 4, GFR 15-29 ml/min ([facility]) N18.4         COMPLIANT WITH MEDICATION:   HTN; Denies chest pain, dyspnea, palpitations, headache and blurred vision. Blood pressure normotensive. Continue BP medications as listed below. Fall sequela follow up from fall  at home in July which led to intensive rehab . Notes reviewed as part of this encounter. She uses a walker /Rolator for ambulation. Skin care  using Amlactin lotion  on her lower extremities . The whirlpool at the [facility] rehab also hellped. . Metoprol changed to 25 mg po q day, Amodipine 5 mg po daily and Senna . She also take robaxin 500 mg 1 every 6 hours as needed prn. She endorses she rarely takes Robaxin.     depression screening addressed not at risk    abuse screening addressed denies    learning assessment addressed reviewed nurses notes    fall risk addressed at risk using a Rolator walker,and attending PT     HM: addressed Reminded to schedule an eye exam    ROS:  Gen: denies fever, chills, fatigue, weight loss, weight gain  HEENT:denies blurry vision, nasal congestion, sore throat  Resp: denies dypsnea, cough, wheezing  CV: denies chest pain radiating to the jaws or arms, palpitations, lower extremity edema  Abd: denies nausea, vomiting, diarrhea, constipation  Neuro: denies numbness/tingling  Endo: denies polyuria, polydipsia, heat/cold intolerance  Heme: no lymphadenopathy    No Known Allergies      Current Outpatient Medications:     ammonium lactate (AMLACTIN EX), by Apply Externally route., Disp: , Rfl:     amLODIPine (NORVASC) 5 mg tablet, daily. , Disp: , Rfl:     methocarbamoL (ROBAXIN) 500 mg tablet, 500 mg four (4) times daily. PRN, Disp: , Rfl:     polyethylene glycol (MIRALAX) 17 gram/dose powder, , Disp: , Rfl:     metoprolol tartrate (LOPRESSOR) 25 mg tablet, 25 mg two (2) times a day., Disp: , Rfl:     multivitamin (ONE A DAY) tablet, Take 1 Tablet by mouth daily. , Disp: , Rfl:     acetaminophen (TYLENOL) 500 mg tablet, Take 1 Tablet by mouth every six (6) hours as needed for Pain., Disp: 30 Tablet, Rfl: 0    hydroCHLOROthiazide (HYDRODIURIL) 25 mg tablet, Take 1 Tablet by mouth daily. , Disp: 30 Tablet, Rfl: 5    SENNA, TAKE 1 TABLET BY MOUTH EVERY NIGHT AT BEDTIME (Patient not taking: Reported on 8/30/2022), Disp: , Rfl:     lidocaine HCL 4 % ptmd, 1 Each by Apply Externally route two (2) times daily as needed for Pain. (Patient not taking: Reported on 8/30/2022), Disp: 5 Each, Rfl: 1    simvastatin (ZOCOR) 20 mg tablet, Take 1 Tablet by mouth nightly. (Patient not taking: Reported on 8/30/2022), Disp: 90 Tablet, Rfl: 1    metoprolol tartrate (LOPRESSOR) 50 mg tablet, Take 1 Tablet by mouth two (2) times a day.  (Patient not taking: Reported on 8/30/2022), Disp: 60 Tablet, Rfl: 5    amLODIPine-benazepril (LOTREL) 5-10 mg per capsule, TAKE ONE CAPSULE BY MOUTH EVERY DAY (HYPERTENSION) (Patient not taking: Reported on 8/30/2022), Disp: 90 Capsule, Rfl: 1    Past Medical History:   Diagnosis Date    Chronic kidney disease     Hypercholesterolemia     Hypertension        Past Surgical History:   Procedure Laterality Date    HX CATARACT REMOVAL Right 03/27/2018    HX CATARACT REMOVAL  05/2018    left    HX CYST REMOVAL  1976    ear,       Social History     Tobacco Use   Smoking Status Never   Smokeless Tobacco Never       Social History     Socioeconomic History    Marital status:    Tobacco Use    Smoking status: Never    Smokeless tobacco: Never   Vaping Use    Vaping Use: Never used   Substance and Sexual Activity    Alcohol use: No   Other Topics Concern     Service No    Blood Transfusions No    Caffeine Concern No    Occupational Exposure No    Hobby Hazards No    Sleep Concern No    Stress Concern No    Weight Concern No    Special Diet No    Back Care No    Exercise Yes    Bike Helmet No    Seat Belt Yes    Self-Exams Yes     Social Determinants of Health     Financial Resource Strain: Low Risk     Difficulty of Paying Living Expenses: Not hard at all   Food Insecurity: No Food Insecurity    Worried About Running Out of Food in the Last Year: Never true    Ran Out of Food in the Last Year: Never true   Transportation Needs: No Transportation Needs    Lack of Transportation (Medical): No    Lack of Transportation (Non-Medical): No       Family History   Problem Relation Age of Onset    Hypertension Mother          Objective:     Visit Vitals  /76 (BP 1 Location: Right arm, BP Patient Position: Sitting, BP Cuff Size: Adult)   Pulse 84   Temp 96.8 °F (36 °C) (Temporal)   Resp 20   Ht 5' 4\" (1.626 m)   Wt 168 lb (76.2 kg)   SpO2 99%   BMI 28.84 kg/m²     Body mass index is 28.84 kg/m². General: Alert and oriented. No acute distress. Well nourished  HEENT :  Ears:TMs are normal. Canals are clear. Eyes: pupils equal, round, react to light and accommodation. Nose: patent. Mouth and throat is clear. Neck:supple full range of motion no thyromegaly. Trachea midline, No carotid bruits. No significant lymphadenopathy  Lungs[de-identified] clear to auscultation without wheezes, rales, or rhonchi. Heart :RRR, S1 & S2 are normal intensity. No murmur; no gallop  Abdomen: bowel sounds active. No tenderness, guarding, rebound, masses, hepatic or spleen enlargement  Back: no CVA tenderness.   Extremities: without clubbing, cyanosis, or edema  Pulses: radial and femoral pulses are normal  Neuro: HMF intact. Cranial nerves II through XII grossly normal.  Motor: is 4 over 5 and symmetrical.   Deep tendon reflexes: +2 equal  Integumentary leg wounds have healed . Thickened dark patches decreased in size. Healthy pink skin is prominent. Psych:appropriate behavior, mood, affect and judgement. Results for orders placed or performed during the hospital encounter of 06/24/22   CBC WITH AUTOMATED DIFF   Result Value Ref Range    WBC 10.8 3.6 - 11.0 K/uL    RBC 3.76 (L) 3.80 - 5.20 M/uL    HGB 11.0 (L) 11.5 - 16.0 g/dL    HCT 35.2 35.0 - 47.0 %    MCV 93.6 80.0 - 99.0 FL    MCH 29.3 26.0 - 34.0 PG    MCHC 31.3 30.0 - 36.5 g/dL    RDW 15.8 (H) 11.5 - 14.5 %    PLATELET 478 835 - 245 K/uL    MPV 10.7 8.9 - 12.9 FL    NRBC 0.0 0  WBC    ABSOLUTE NRBC 0.00 0.00 - 0.01 K/uL    NEUTROPHILS 73 32 - 75 %    LYMPHOCYTES 16 12 - 49 %    MONOCYTES 9 5 - 13 %    EOSINOPHILS 1 0 - 7 %    BASOPHILS 1 0 - 1 %    IMMATURE GRANULOCYTES 0 0.0 - 0.5 %    ABS. NEUTROPHILS 7.8 1.8 - 8.0 K/UL    ABS. LYMPHOCYTES 1.7 0.8 - 3.5 K/UL    ABS. MONOCYTES 1.0 0.0 - 1.0 K/UL    ABS. EOSINOPHILS 0.2 0.0 - 0.4 K/UL    ABS. BASOPHILS 0.1 0.0 - 0.1 K/UL    ABS. IMM. GRANS. 0.0 0.00 - 0.04 K/UL    DF AUTOMATED     METABOLIC PANEL, COMPREHENSIVE   Result Value Ref Range    Sodium 141 136 - 145 mmol/L    Potassium 5.0 3.5 - 5.1 mmol/L    Chloride 107 97 - 108 mmol/L    CO2 22 21 - 32 mmol/L    Anion gap 12 5 - 15 mmol/L    Glucose 112 (H) 65 - 100 mg/dL    BUN 35 (H) 6 - 20 MG/DL    Creatinine 2.08 (H) 0.55 - 1.02 MG/DL    BUN/Creatinine ratio 17 12 - 20      GFR est AA 27 (L) >60 ml/min/1.73m2    GFR est non-AA 22 (L) >60 ml/min/1.73m2    Calcium 9.4 8.5 - 10.1 MG/DL    Bilirubin, total 0.5 0.2 - 1.0 MG/DL    ALT (SGPT) 20 12 - 78 U/L    AST (SGOT) 27 15 - 37 U/L    Alk.  phosphatase 134 (H) 45 - 117 U/L    Protein, total 8.4 (H) 6.4 - 8.2 g/dL    Albumin 2.8 (L) 3.5 - 5.0 g/dL    Globulin 5.6 (H) 2.0 - 4.0 g/dL    A-G Ratio 0.5 (L) 1.1 - 2.2     PROTHROMBIN TIME + INR   Result Value Ref Range    INR 1.0 0.9 - 1.1      Prothrombin time 10.3 9.0 - 11.1 sec   PTT   Result Value Ref Range    aPTT 25.4 22.1 - 31.0 sec    aPTT, therapeutic range     58.0 - 77.0 SECS   LACTIC ACID   Result Value Ref Range    Lactic acid 3.2 (HH) 0.4 - 2.0 MMOL/L       No results found for this visit on 22. Assessment/ Plan:     1. Essential hypertension  BP in goal continue BP medications as listed above. 2. CKD (chronic kidney disease) stage 4, GFR 15-29 ml/min ([facility])  Stable continue plan of care    3. Fall, subsequent encounter  Greatly improved  overall health. Continue exercises with PT . Agrre with medication changes. Orders Placed This Encounter    ammonium lactate (AMLACTIN EX)     Sig: by Apply Externally route. amLODIPine (NORVASC) 5 mg tablet     Sig: daily. SENNA     Sig: TAKE 1 TABLET BY MOUTH EVERY NIGHT AT BEDTIME    methocarbamoL (ROBAXIN) 500 mg tablet     Si mg four (4) times daily. PRN    polyethylene glycol (MIRALAX) 17 gram/dose powder    metoprolol tartrate (LOPRESSOR) 25 mg tablet     Si mg two (2) times a day. multivitamin (ONE A DAY) tablet     Sig: Take 1 Tablet by mouth daily. Verbal and written instructions (see AVS) provided. Patient expresses understanding of diagnosis and treatment plan.     Health Maintenance Due   Topic Date Due    Eye Exam Retinal or Dilated  Never done    COVID-19 Vaccine (4 - Booster for Huston Peter series) 2022               CYDNEY Quintero

## 2022-11-08 ENCOUNTER — OFFICE VISIT (OUTPATIENT)
Dept: FAMILY MEDICINE CLINIC | Age: 87
End: 2022-11-08
Payer: MEDICARE

## 2022-11-08 VITALS
DIASTOLIC BLOOD PRESSURE: 70 MMHG | HEIGHT: 64 IN | SYSTOLIC BLOOD PRESSURE: 162 MMHG | TEMPERATURE: 97.1 F | BODY MASS INDEX: 33.8 KG/M2 | WEIGHT: 198 LBS | OXYGEN SATURATION: 100 % | RESPIRATION RATE: 18 BRPM | HEART RATE: 81 BPM

## 2022-11-08 DIAGNOSIS — I87.2 VENOUS STASIS DERMATITIS OF BOTH LOWER EXTREMITIES: Primary | ICD-10-CM

## 2022-11-08 DIAGNOSIS — I10 ESSENTIAL HYPERTENSION: ICD-10-CM

## 2022-11-08 PROCEDURE — 99214 OFFICE O/P EST MOD 30 MIN: CPT | Performed by: NURSE PRACTITIONER

## 2022-11-08 RX ORDER — AMLODIPINE AND BENAZEPRIL HYDROCHLORIDE 5; 10 MG/1; MG/1
1 CAPSULE ORAL DAILY
COMMUNITY

## 2022-11-08 RX ORDER — HYDROCHLOROTHIAZIDE 25 MG/1
25 TABLET ORAL DAILY
Qty: 90 TABLET | Refills: 1 | Status: SHIPPED | OUTPATIENT
Start: 2022-11-08

## 2022-11-09 NOTE — ACP (ADVANCE CARE PLANNING)
Has advanced medical directive scanned into chart. Reviewed at this visit. No changes.   Anahi POOLE

## 2022-11-09 NOTE — PROGRESS NOTES
Subjective:     Terrance Espinal is a 80 y.o. female who presents today with the following:  Chief Complaint   Patient presents with    Swelling     Both legs       Patient Active Problem List   Diagnosis Code    Hypertension I10    Hypercholesterolemia E78.00    Chronic kidney disease N18.9    Type 2 diabetes with nephropathy ([facility]) E11.21    Severe obesity (BMI 35.0-39. 9) with comorbidity ([facility]) E66.01    Pre-diabetes R73.03    Rectal bleeding K62.5    CKD (chronic kidney disease) stage 3, GFR 30-59 ml/min ([facility]) N18.30    CKD (chronic kidney disease) stage 4, GFR 15-29 ml/min ([facility]) N18.4         COMPLIANT WITH MEDICATION:   HTN; Denies chest pain, dyspnea, palpitations, headache and blurred vision. Blood pressure elevated today. She is requesting refill on the following BP medications and clarifications regarding how to take them. amLODIPine-benazepril (LOTREL) 5-10 mg per capsule, Take 1 Capsule by mouth daily. , Disp: , Rfl:     hydroCHLOROthiazide (HYDRODIURIL) 25 mg tablet, Take 1 Tablet by mouth daily. , Disp: 90 Tablet, Rfl: 1    metoprolol tartrate (LOPRESSOR) 50 mg tablet, Take 1 Tablet by mouth two (2) times a day., Disp: 60 Tablet, Rfl: 5      Venous stasis dermatitis     She endorses bathing her leg in warm soapy water  Gently taking off the dead skin and applying   ammonium lactate (AMLACTIN EX), by Apply Externally route., Disp: , Rfl:     depression screening addressed not at risk    abuse screening addressed denies    learning assessment addressed reviewed nurses notes    fall risk addressed not at risk    HM: addressed declines fu immunization today.      ROS:  Gen: denies fever, chills, fatigue, weight loss, weight gain  HEENT:denies blurry vision, nasal congestion, sore throat  Resp: denies dypsnea, cough, wheezing  CV: denies chest pain radiating to the jaws or arms, palpitations, lower extremity edema  Abd: denies nausea, vomiting, diarrhea, constipation  Neuro: denies numbness/tingling  Endo: denies polyuria, polydipsia, heat/cold intolerance  Heme: no lymphadenopathy    No Known Allergies      Current Outpatient Medications:     amLODIPine-benazepril (LOTREL) 5-10 mg per capsule, Take 1 Capsule by mouth daily. , Disp: , Rfl:     hydroCHLOROthiazide (HYDRODIURIL) 25 mg tablet, Take 1 Tablet by mouth daily. , Disp: 90 Tablet, Rfl: 1    ammonium lactate (AMLACTIN EX), by Apply Externally route., Disp: , Rfl:     polyethylene glycol (MIRALAX) 17 gram/dose powder, , Disp: , Rfl:     multivitamin (ONE A DAY) tablet, Take 1 Tablet by mouth daily. , Disp: , Rfl:     acetaminophen (TYLENOL) 500 mg tablet, Take 1 Tablet by mouth every six (6) hours as needed for Pain., Disp: 30 Tablet, Rfl: 0    metoprolol tartrate (LOPRESSOR) 50 mg tablet, Take 1 Tablet by mouth two (2) times a day., Disp: 60 Tablet, Rfl: 5    Past Medical History:   Diagnosis Date    Chronic kidney disease     Hypercholesterolemia     Hypertension        Past Surgical History:   Procedure Laterality Date    HX CATARACT REMOVAL Right 03/27/2018    HX CATARACT REMOVAL  05/2018    left    HX CYST REMOVAL  1976    ear,       Social History     Tobacco Use   Smoking Status Never   Smokeless Tobacco Never       Social History     Socioeconomic History    Marital status:    Tobacco Use    Smoking status: Never    Smokeless tobacco: Never   Vaping Use    Vaping Use: Never used   Substance and Sexual Activity    Alcohol use: No   Other Topics Concern     Service No    Blood Transfusions No    Caffeine Concern No    Occupational Exposure No    Hobby Hazards No    Sleep Concern No    Stress Concern No    Weight Concern No    Special Diet No    Back Care No    Exercise Yes    Bike Helmet No    Seat Belt Yes    Self-Exams Yes     Social Determinants of Health     Financial Resource Strain: Low Risk     Difficulty of Paying Living Expenses: Not hard at all   Food Insecurity: No Food Insecurity    Worried About Running Out of Food in the Last Year: Never true    Ran Out of Food in the Last Year: Never true   Transportation Needs: No Transportation Needs    Lack of Transportation (Medical): No    Lack of Transportation (Non-Medical): No       Family History   Problem Relation Age of Onset    Hypertension Mother          Objective:     Visit Vitals  BP (!) 162/70 (BP 1 Location: Right upper arm, BP Patient Position: Sitting, BP Cuff Size: Large adult)   Pulse 81   Temp 97.1 °F (36.2 °C) (Temporal)   Resp 18   Ht 5' 4\" (1.626 m)   Wt 198 lb (89.8 kg)   SpO2 100%   BMI 33.99 kg/m²     Body mass index is 33.99 kg/m². General: Alert and oriented. No acute distress. Well nourished  HEENT :  Ears:TMs are normal. Canals are clear. Eyes: pupils equal, round, react to light and accommodation. Extra ocular movements intact. Nose: patent. Mouth and throat is clear. Neck:supple full range of motion no thyromegaly. Trachea midline, No carotid bruits. No significant lymphadenopathy  Lungs[de-identified] clear to auscultation without wheezes, rales, or rhonchi. Heart :RRR, S1 & S2 are normal intensity. No murmur; no gallop  Abdomen: bowel sounds active. No tenderness, guarding, rebound, masses, hepatic or spleen enlargement  Back: no CVA tenderness. Extremities: without clubbing, or cyanosis,  edema +2 nonpitting with darkening of skin. No open areas or ulcerations noted. Pulses: radial and femoral pulses are normal  Neuro: HMF intact. Cranial nerves II through XII grossly normal.  Motor: is 5 over 5 and symmetrical.   Deep tendon reflexes: +2 equal  Psych:appropriate behavior, mood, affect and judgement.    Results for orders placed or performed during the hospital encounter of 06/24/22   CBC WITH AUTOMATED DIFF   Result Value Ref Range    WBC 10.8 3.6 - 11.0 K/uL    RBC 3.76 (L) 3.80 - 5.20 M/uL    HGB 11.0 (L) 11.5 - 16.0 g/dL    HCT 35.2 35.0 - 47.0 %    MCV 93.6 80.0 - 99.0 FL    MCH 29.3 26.0 - 34.0 PG    MCHC 31.3 30.0 - 36.5 g/dL    RDW 15.8 (H) 11.5 - 14.5 % PLATELET 108 237 - 323 K/uL    MPV 10.7 8.9 - 12.9 FL    NRBC 0.0 0  WBC    ABSOLUTE NRBC 0.00 0.00 - 0.01 K/uL    NEUTROPHILS 73 32 - 75 %    LYMPHOCYTES 16 12 - 49 %    MONOCYTES 9 5 - 13 %    EOSINOPHILS 1 0 - 7 %    BASOPHILS 1 0 - 1 %    IMMATURE GRANULOCYTES 0 0.0 - 0.5 %    ABS. NEUTROPHILS 7.8 1.8 - 8.0 K/UL    ABS. LYMPHOCYTES 1.7 0.8 - 3.5 K/UL    ABS. MONOCYTES 1.0 0.0 - 1.0 K/UL    ABS. EOSINOPHILS 0.2 0.0 - 0.4 K/UL    ABS. BASOPHILS 0.1 0.0 - 0.1 K/UL    ABS. IMM. GRANS. 0.0 0.00 - 0.04 K/UL    DF AUTOMATED     METABOLIC PANEL, COMPREHENSIVE   Result Value Ref Range    Sodium 141 136 - 145 mmol/L    Potassium 5.0 3.5 - 5.1 mmol/L    Chloride 107 97 - 108 mmol/L    CO2 22 21 - 32 mmol/L    Anion gap 12 5 - 15 mmol/L    Glucose 112 (H) 65 - 100 mg/dL    BUN 35 (H) 6 - 20 MG/DL    Creatinine 2.08 (H) 0.55 - 1.02 MG/DL    BUN/Creatinine ratio 17 12 - 20      GFR est AA 27 (L) >60 ml/min/1.73m2    GFR est non-AA 22 (L) >60 ml/min/1.73m2    Calcium 9.4 8.5 - 10.1 MG/DL    Bilirubin, total 0.5 0.2 - 1.0 MG/DL    ALT (SGPT) 20 12 - 78 U/L    AST (SGOT) 27 15 - 37 U/L    Alk. phosphatase 134 (H) 45 - 117 U/L    Protein, total 8.4 (H) 6.4 - 8.2 g/dL    Albumin 2.8 (L) 3.5 - 5.0 g/dL    Globulin 5.6 (H) 2.0 - 4.0 g/dL    A-G Ratio 0.5 (L) 1.1 - 2.2     PROTHROMBIN TIME + INR   Result Value Ref Range    INR 1.0 0.9 - 1.1      Prothrombin time 10.3 9.0 - 11.1 sec   PTT   Result Value Ref Range    aPTT 25.4 22.1 - 31.0 sec    aPTT, therapeutic range     58.0 - 77.0 SECS   LACTIC ACID   Result Value Ref Range    Lactic acid 3.2 (HH) 0.4 - 2.0 MMOL/L       No results found for this visit on 11/08/22. Assessment/ Plan:     1. Essential hypertension  Reordered   amLODIPine-benazepril (LOTREL) 5-10 mg per capsule, Take 1 Capsule by mouth daily. , Disp: , Rfl:     hydroCHLOROthiazide (HYDRODIURIL) 25 mg tablet, Take 1 Tablet by mouth daily. , Disp: 90 Tablet, Rfl: 1    metoprolol tartrate (LOPRESSOR) 50 mg tablet, Take 1 Tablet by mouth two (2) times a day., Disp: 60 Tablet, Rfl: 5      2. Venous stasis dermatitis of both lower extremities  Continue care as noted in HPI . Monitor for blisters and ulcerations. Elevate lower extremities when sitting and lying down  Continue     ammonium lactate (AMLACTIN EX), by Apply Externally route., Disp: , Rfl:       Orders Placed This Encounter    amLODIPine-benazepril (LOTREL) 5-10 mg per capsule     Sig: Take 1 Capsule by mouth daily. hydroCHLOROthiazide (HYDRODIURIL) 25 mg tablet     Sig: Take 1 Tablet by mouth daily. Dispense:  90 Tablet     Refill:  1         Verbal and written instructions (see AVS) provided. Patient expresses understanding of diagnosis and treatment plan.     Follow up in 3 weeks    Health Maintenance Due   Topic Date Due    Eye Exam Retinal or Dilated  Never done    Shingrix Vaccine Age 50> (1 of 2) Never done    Flu Vaccine (1) Never done               CYDNEY Contreras

## 2022-11-29 ENCOUNTER — OFFICE VISIT (OUTPATIENT)
Dept: FAMILY MEDICINE CLINIC | Age: 87
End: 2022-11-29
Payer: MEDICARE

## 2022-11-29 VITALS
WEIGHT: 203 LBS | HEART RATE: 89 BPM | RESPIRATION RATE: 30 BRPM | TEMPERATURE: 98.1 F | OXYGEN SATURATION: 99 % | SYSTOLIC BLOOD PRESSURE: 156 MMHG | BODY MASS INDEX: 34.66 KG/M2 | HEIGHT: 64 IN | DIASTOLIC BLOOD PRESSURE: 62 MMHG

## 2022-11-29 DIAGNOSIS — I73.9 PVD (PERIPHERAL VASCULAR DISEASE) (HCC): ICD-10-CM

## 2022-11-29 DIAGNOSIS — I87.8 VENOUS STASIS: Primary | ICD-10-CM

## 2022-11-29 NOTE — PROGRESS NOTES
Chief Complaint   Patient presents with    Hypertension    Dermatitis     Lower extremity     1. \"Have you been to the ER, urgent care clinic since your last visit? Hospitalized since your last visit? \" No    2. \"Have you seen or consulted any other health care providers outside of the [address] [place] [address] since your last visit? \" No     3. For patients aged 39-70: Has the patient had a colonoscopy / FIT/ Cologuard? NA - based on age      If the patient is female:    4. For patients aged 41-77: Has the patient had a mammogram within the past 2 years? NA - based on age or sex      11. For patients aged 21-65: Has the patient had a pap smear?  NA - based on age or sex

## 2022-12-02 NOTE — PROGRESS NOTES
Subjective:     Susanne Escamilla is a 80 y.o. female who presents today with the following:  Chief Complaint   Patient presents with    Hypertension    Skin Problem     Venous stasis         Patient Active Problem List   Diagnosis Code    Hypertension I10    Hypercholesterolemia E78.00    Chronic kidney disease N18.9    Type 2 diabetes with nephropathy (HCC) E11.21    Severe obesity (BMI 35.0-39. 9) with comorbidity (HCC) E66.01    Pre-diabetes R73.03    Rectal bleeding K62.5    CKD (chronic kidney disease) stage 3, GFR 30-59 ml/min (HCC) N18.30    CKD (chronic kidney disease) stage 4, GFR 15-29 ml/min (HCC) N18.4         COMPLIANT WITH MEDICATION:   HTN; Denies chest pain, dyspnea, palpitations, headache and blurred vision. Blood pressure improved  still elevated. amLODIPine-benazepril (LOTREL) 5-10 mg per capsule, Take 1 Capsule by mouth daily. , Disp: , Rfl:     hydroCHLOROthiazide (HYDRODIURIL) 25 mg tablet, Take 1 Tablet by mouth daily. , Disp: 90 Tablet, Rfl: 1    Venous stasis chronic concern . MS. Opal Salinas endorses no open areas or sores. Lower extremity skin is flaky and dry. depression screening addressed not at risk    abuse screening addressed denies    learning assessment addressed reviewed nurses notes    fall risk addressed not at risk    HM: addressed reminded to schedule eye exam.    ROS:  Gen: denies fever, chills, fatigue, weight loss, weight gain  HEENT:denies blurry vision, nasal congestion, sore throat  Resp: denies dypsnea, cough, wheezing  CV: denies chest pain radiating to the jaws or arms, palpitations, lower extremity edema  Abd: denies nausea, vomiting, diarrhea, constipation  Neuro: denies numbness/tingling  Endo: denies polyuria, polydipsia, heat/cold intolerance  Heme: no lymphadenopathy    No Known Allergies      Current Outpatient Medications:     amLODIPine-benazepril (LOTREL) 5-10 mg per capsule, Take 1 Capsule by mouth daily. , Disp: , Rfl:     hydroCHLOROthiazide (HYDRODIURIL) 25 mg tablet, Take 1 Tablet by mouth daily. , Disp: 90 Tablet, Rfl: 1    ammonium lactate (AMLACTIN EX), by Apply Externally route., Disp: , Rfl:     polyethylene glycol (MIRALAX) 17 gram/dose powder, , Disp: , Rfl:     multivitamin (ONE A DAY) tablet, Take 1 Tablet by mouth daily. , Disp: , Rfl:     acetaminophen (TYLENOL) 500 mg tablet, Take 1 Tablet by mouth every six (6) hours as needed for Pain., Disp: 30 Tablet, Rfl: 0    metoprolol tartrate (LOPRESSOR) 50 mg tablet, Take 1 Tablet by mouth two (2) times a day., Disp: 60 Tablet, Rfl: 5    Past Medical History:   Diagnosis Date    Chronic kidney disease     Hypercholesterolemia     Hypertension        Past Surgical History:   Procedure Laterality Date    HX CATARACT REMOVAL Right 03/27/2018    HX CATARACT REMOVAL  05/2018    left    HX CYST REMOVAL  1976    ear,       Social History     Tobacco Use   Smoking Status Never   Smokeless Tobacco Never       Social History     Socioeconomic History    Marital status:    Tobacco Use    Smoking status: Never    Smokeless tobacco: Never   Vaping Use    Vaping Use: Never used   Substance and Sexual Activity    Alcohol use: No   Other Topics Concern     Service No    Blood Transfusions No    Caffeine Concern No    Occupational Exposure No    Hobby Hazards No    Sleep Concern No    Stress Concern No    Weight Concern No    Special Diet No    Back Care No    Exercise Yes    Bike Helmet No    Seat Belt Yes    Self-Exams Yes     Social Determinants of Health     Financial Resource Strain: Low Risk     Difficulty of Paying Living Expenses: Not hard at all   Food Insecurity: No Food Insecurity    Worried About Running Out of Food in the Last Year: Never true    Ran Out of Food in the Last Year: Never true   Transportation Needs: No Transportation Needs    Lack of Transportation (Medical): No    Lack of Transportation (Non-Medical): No       Family History   Problem Relation Age of Onset    Hypertension Mother Objective:     Visit Vitals  BP (!) 156/62 (BP 1 Location: Left upper arm, BP Patient Position: Sitting, BP Cuff Size: Large adult)   Pulse 89   Temp 98.1 °F (36.7 °C) (Temporal)   Resp 30   Ht 5' 4\" (1.626 m)   Wt 203 lb (92.1 kg)   SpO2 99%   BMI 34.84 kg/m²     Body mass index is 34.84 kg/m². General: Alert and oriented. No acute distress. Well nourished  HEENT :  Ears:TMs are normal. Canals are clear. Eyes: pupils equal, round, react to light and accommodation. Extra ocular movements intact. Nose: patent. Mouth and throat is clear. Neck:supple full range of motion no thyromegaly. Trachea midline, No carotid bruits. No significant lymphadenopathy  Lungs[de-identified] clear to auscultation without wheezes, rales, or rhonchi. Heart :RRR, S1 & S2 are normal intensity. No murmur; no gallop  Abdomen: bowel sounds active. No tenderness, guarding, rebound, masses, hepatic or spleen enlargement  Back: no CVA tenderness. Extremities: without clubbing or cyanosis, +1 bilateral edema lower extremities with venous stasis thick. dark scales . Pulses: radial and femoral pulses are normal  Neuro: HMF intact. Cranial nerves II through XII grossly normal.  Motor: is 5 over 5 and symmetrical.   Deep tendon reflexes: +2 equal  Psych:appropriate behavior, mood, affect and judgement.    Results for orders placed or performed during the hospital encounter of 06/24/22   CBC WITH AUTOMATED DIFF   Result Value Ref Range    WBC 10.8 3.6 - 11.0 K/uL    RBC 3.76 (L) 3.80 - 5.20 M/uL    HGB 11.0 (L) 11.5 - 16.0 g/dL    HCT 35.2 35.0 - 47.0 %    MCV 93.6 80.0 - 99.0 FL    MCH 29.3 26.0 - 34.0 PG    MCHC 31.3 30.0 - 36.5 g/dL    RDW 15.8 (H) 11.5 - 14.5 %    PLATELET 711 066 - 036 K/uL    MPV 10.7 8.9 - 12.9 FL    NRBC 0.0 0  WBC    ABSOLUTE NRBC 0.00 0.00 - 0.01 K/uL    NEUTROPHILS 73 32 - 75 %    LYMPHOCYTES 16 12 - 49 %    MONOCYTES 9 5 - 13 %    EOSINOPHILS 1 0 - 7 %    BASOPHILS 1 0 - 1 %    IMMATURE GRANULOCYTES 0 0.0 - 0.5 % ABS. NEUTROPHILS 7.8 1.8 - 8.0 K/UL    ABS. LYMPHOCYTES 1.7 0.8 - 3.5 K/UL    ABS. MONOCYTES 1.0 0.0 - 1.0 K/UL    ABS. EOSINOPHILS 0.2 0.0 - 0.4 K/UL    ABS. BASOPHILS 0.1 0.0 - 0.1 K/UL    ABS. IMM. GRANS. 0.0 0.00 - 0.04 K/UL    DF AUTOMATED     METABOLIC PANEL, COMPREHENSIVE   Result Value Ref Range    Sodium 141 136 - 145 mmol/L    Potassium 5.0 3.5 - 5.1 mmol/L    Chloride 107 97 - 108 mmol/L    CO2 22 21 - 32 mmol/L    Anion gap 12 5 - 15 mmol/L    Glucose 112 (H) 65 - 100 mg/dL    BUN 35 (H) 6 - 20 MG/DL    Creatinine 2.08 (H) 0.55 - 1.02 MG/DL    BUN/Creatinine ratio 17 12 - 20      GFR est AA 27 (L) >60 ml/min/1.73m2    GFR est non-AA 22 (L) >60 ml/min/1.73m2    Calcium 9.4 8.5 - 10.1 MG/DL    Bilirubin, total 0.5 0.2 - 1.0 MG/DL    ALT (SGPT) 20 12 - 78 U/L    AST (SGOT) 27 15 - 37 U/L    Alk. phosphatase 134 (H) 45 - 117 U/L    Protein, total 8.4 (H) 6.4 - 8.2 g/dL    Albumin 2.8 (L) 3.5 - 5.0 g/dL    Globulin 5.6 (H) 2.0 - 4.0 g/dL    A-G Ratio 0.5 (L) 1.1 - 2.2     PROTHROMBIN TIME + INR   Result Value Ref Range    INR 1.0 0.9 - 1.1      Prothrombin time 10.3 9.0 - 11.1 sec   PTT   Result Value Ref Range    aPTT 25.4 22.1 - 31.0 sec    aPTT, therapeutic range     58.0 - 77.0 SECS   LACTIC ACID   Result Value Ref Range    Lactic acid 3.2 (HH) 0.4 - 2.0 MMOL/L       No results found for this visit on 11/29/22. Assessment/ Plan:     1. Venous stasis  Alessandra boot bilateral application    2. PVD (peripheral vascular disease) ([address]  Alessandra boot bilateral application. No orders of the defined types were placed in this encounter. Verbal and written instructions (see AVS) provided. Patient expresses understanding of diagnosis and treatment plan. Health Maintenance Due   Topic Date Due    Eye Exam Retinal or Dilated  Never done    Shingrix Vaccine Age 50> (1 of 2) Never done         folow up in 1 to 2 weeks.        Jigar Cristina, SOCO-C

## 2022-12-05 ENCOUNTER — OFFICE VISIT (OUTPATIENT)
Dept: FAMILY MEDICINE CLINIC | Age: 87
End: 2022-12-05
Payer: MEDICARE

## 2022-12-05 VITALS
HEART RATE: 98 BPM | HEIGHT: 64 IN | DIASTOLIC BLOOD PRESSURE: 70 MMHG | BODY MASS INDEX: 35.34 KG/M2 | RESPIRATION RATE: 30 BRPM | SYSTOLIC BLOOD PRESSURE: 172 MMHG | TEMPERATURE: 98.1 F | WEIGHT: 207 LBS | OXYGEN SATURATION: 100 %

## 2022-12-05 DIAGNOSIS — I10 ESSENTIAL HYPERTENSION: ICD-10-CM

## 2022-12-05 DIAGNOSIS — I73.9 PVD (PERIPHERAL VASCULAR DISEASE) (HCC): Primary | ICD-10-CM

## 2022-12-05 DIAGNOSIS — E66.01 SEVERE OBESITY (BMI 35.0-39.9) WITH COMORBIDITY (HCC): ICD-10-CM

## 2022-12-05 PROCEDURE — 1090F PRES/ABSN URINE INCON ASSESS: CPT | Performed by: NURSE PRACTITIONER

## 2022-12-05 PROCEDURE — 99214 OFFICE O/P EST MOD 30 MIN: CPT | Performed by: NURSE PRACTITIONER

## 2022-12-05 PROCEDURE — G8536 NO DOC ELDER MAL SCRN: HCPCS | Performed by: NURSE PRACTITIONER

## 2022-12-05 PROCEDURE — G8432 DEP SCR NOT DOC, RNG: HCPCS | Performed by: NURSE PRACTITIONER

## 2022-12-05 PROCEDURE — 1123F ACP DISCUSS/DSCN MKR DOCD: CPT | Performed by: NURSE PRACTITIONER

## 2022-12-05 PROCEDURE — 1101F PT FALLS ASSESS-DOCD LE1/YR: CPT | Performed by: NURSE PRACTITIONER

## 2022-12-05 PROCEDURE — G8417 CALC BMI ABV UP PARAM F/U: HCPCS | Performed by: NURSE PRACTITIONER

## 2022-12-05 PROCEDURE — G8427 DOCREV CUR MEDS BY ELIG CLIN: HCPCS | Performed by: NURSE PRACTITIONER

## 2022-12-05 RX ORDER — METOPROLOL TARTRATE 50 MG/1
50 TABLET ORAL 2 TIMES DAILY
Qty: 60 TABLET | Refills: 0 | Status: SHIPPED | OUTPATIENT
Start: 2022-12-05

## 2022-12-05 NOTE — PROGRESS NOTES
Chief Complaint   Patient presents with    Leg Swelling     Followup bilateral     1. \"Have you been to the ER, urgent care clinic since your last visit? Hospitalized since your last visit? \" No    2. \"Have you seen or consulted any other health care providers outside of the [address] [place] [address] since your last visit? \" No     3. For patients aged 39-70: Has the patient had a colonoscopy / FIT/ Cologuard? NA - based on age      If the patient is female:    4. For patients aged 41-77: Has the patient had a mammogram within the past 2 years? NA - based on age or sex      11. For patients aged 21-65: Has the patient had a pap smear?  NA - based on age or sex

## 2022-12-07 NOTE — PROGRESS NOTES
Subjective:     Hemalatha Tejeda is a 80 y.o. female who presents today with the following:  Chief Complaint   Patient presents with    Leg Swelling     Followup bilateral       Patient Active Problem List   Diagnosis Code    Hypertension I10    Hypercholesterolemia E78.00    Chronic kidney disease N18.9    Type 2 diabetes with nephropathy (HCC) E11.21    Severe obesity (BMI 35.0-39. 9) with comorbidity (HCC) E66.01    Pre-diabetes R73.03    Rectal bleeding K62.5    CKD (chronic kidney disease) stage 3, GFR 30-59 ml/min (HCC) N18.30    CKD (chronic kidney disease) stage 4, GFR 15-29 ml/min (HCC) N18.4         COMPLIANT WITH MEDICATION:   HTN; Denies chest pain, dyspnea, palpitations, headache and blurred vision. Blood pressure remians elevated       Lower extremity edema; improved since rickey boots removed. depression screening addressed not at risk    abuse screening addressed denies    learning assessment addressed reviewed nurses notes    fall risk addressed not at risk    HM: addressed reminded to schedule eye exam.    ROS:  Gen: denies fever, chills, fatigue, weight loss, weight gain  HEENT:denies blurry vision, nasal congestion, sore throat  Resp: denies dypsnea, cough, wheezing  CV: denies chest pain radiating to the jaws or arms, palpitations, lower extremity edema  Abd: denies nausea, vomiting, diarrhea, constipation  Neuro: denies numbness/tingling  Endo: denies polyuria, polydipsia, heat/cold intolerance  Heme: no lymphadenopathy    No Known Allergies      Current Outpatient Medications:     metoprolol tartrate (LOPRESSOR) 50 mg tablet, Take 1 Tablet by mouth two (2) times a day., Disp: 60 Tablet, Rfl: 0    amLODIPine-benazepril (LOTREL) 5-10 mg per capsule, Take 1 Capsule by mouth daily. , Disp: , Rfl:     hydroCHLOROthiazide (HYDRODIURIL) 25 mg tablet, Take 1 Tablet by mouth daily. , Disp: 90 Tablet, Rfl: 1    polyethylene glycol (MIRALAX) 17 gram/dose powder, , Disp: , Rfl:     multivitamin (ONE A DAY) tablet, Take 1 Tablet by mouth daily. , Disp: , Rfl:     acetaminophen (TYLENOL) 500 mg tablet, Take 1 Tablet by mouth every six (6) hours as needed for Pain., Disp: 30 Tablet, Rfl: 0    ammonium lactate (AMLACTIN EX), by Apply Externally route., Disp: , Rfl:     Past Medical History:   Diagnosis Date    Chronic kidney disease     Hypercholesterolemia     Hypertension        Past Surgical History:   Procedure Laterality Date    HX CATARACT REMOVAL Right 03/27/2018    HX CATARACT REMOVAL  05/2018    left    HX CYST REMOVAL  1976    ear,       Social History     Tobacco Use   Smoking Status Never   Smokeless Tobacco Never       Social History     Socioeconomic History    Marital status:    Tobacco Use    Smoking status: Never    Smokeless tobacco: Never   Vaping Use    Vaping Use: Never used   Substance and Sexual Activity    Alcohol use: No   Other Topics Concern     Service No    Blood Transfusions No    Caffeine Concern No    Occupational Exposure No    Hobby Hazards No    Sleep Concern No    Stress Concern No    Weight Concern No    Special Diet No    Back Care No    Exercise Yes    Bike Helmet No    Seat Belt Yes    Self-Exams Yes     Social Determinants of Health     Financial Resource Strain: Low Risk     Difficulty of Paying Living Expenses: Not hard at all   Food Insecurity: No Food Insecurity    Worried About Running Out of Food in the Last Year: Never true    Ran Out of Food in the Last Year: Never true   Transportation Needs: No Transportation Needs    Lack of Transportation (Medical): No    Lack of Transportation (Non-Medical): No       Family History   Problem Relation Age of Onset    Hypertension Mother          Objective:     Visit Vitals  BP (!) 172/70 (BP 1 Location: Right upper arm, BP Patient Position: Sitting, BP Cuff Size: Large adult)   Pulse 98   Temp 98.1 °F (36.7 °C) (Temporal)   Resp 30   Ht 5' 4\" (1.626 m)   Wt 207 lb (93.9 kg)   SpO2 100%   BMI 35.53 kg/m²     Body mass index is 35.53 kg/m². General: Alert and oriented. No acute distress. Well nourished  HEENT :  Ears:TMs are normal. Canals are clear. Eyes: pupils equal, round, react to light and accommodation. Extra ocular movements intact. Nose: patent. Mouth and throat is clear. Neck:supple full range of motion no thyromegaly. Trachea midline, No carotid bruits. No significant lymphadenopathy  Lungs[de-identified] clear to auscultation without wheezes, rales, or rhonchi. Heart :RRR, S1 & S2 are normal intensity. No murmur; no gallop  Abdomen: bowel sounds active. No tenderness, guarding, rebound, masses, hepatic or spleen enlargement  Back: no CVA tenderness. Extremities: without clubbing, or cyanosis, trace edema lower extremities   Pulses: radial and femoral pulses are normal  Neuro: HMF intact. Cranial nerves II through XII grossly normal.  Motor: is 5 over 5 and symmetrical.   Deep tendon reflexes: +2 equal  Psych:appropriate behavior, mood, affect and judgement. Results for orders placed or performed during the hospital encounter of 06/24/22   CBC WITH AUTOMATED DIFF   Result Value Ref Range    WBC 10.8 3.6 - 11.0 K/uL    RBC 3.76 (L) 3.80 - 5.20 M/uL    HGB 11.0 (L) 11.5 - 16.0 g/dL    HCT 35.2 35.0 - 47.0 %    MCV 93.6 80.0 - 99.0 FL    MCH 29.3 26.0 - 34.0 PG    MCHC 31.3 30.0 - 36.5 g/dL    RDW 15.8 (H) 11.5 - 14.5 %    PLATELET 029 396 - 391 K/uL    MPV 10.7 8.9 - 12.9 FL    NRBC 0.0 0  WBC    ABSOLUTE NRBC 0.00 0.00 - 0.01 K/uL    NEUTROPHILS 73 32 - 75 %    LYMPHOCYTES 16 12 - 49 %    MONOCYTES 9 5 - 13 %    EOSINOPHILS 1 0 - 7 %    BASOPHILS 1 0 - 1 %    IMMATURE GRANULOCYTES 0 0.0 - 0.5 %    ABS. NEUTROPHILS 7.8 1.8 - 8.0 K/UL    ABS. LYMPHOCYTES 1.7 0.8 - 3.5 K/UL    ABS. MONOCYTES 1.0 0.0 - 1.0 K/UL    ABS. EOSINOPHILS 0.2 0.0 - 0.4 K/UL    ABS. BASOPHILS 0.1 0.0 - 0.1 K/UL    ABS. IMM.  GRANS. 0.0 0.00 - 0.04 K/UL    DF AUTOMATED     METABOLIC PANEL, COMPREHENSIVE   Result Value Ref Range    Sodium 141 136 - 145 mmol/L    Potassium 5.0 3.5 - 5.1 mmol/L    Chloride 107 97 - 108 mmol/L    CO2 22 21 - 32 mmol/L    Anion gap 12 5 - 15 mmol/L    Glucose 112 (H) 65 - 100 mg/dL    BUN 35 (H) 6 - 20 MG/DL    Creatinine 2.08 (H) 0.55 - 1.02 MG/DL    BUN/Creatinine ratio 17 12 - 20      GFR est AA 27 (L) >60 ml/min/1.73m2    GFR est non-AA 22 (L) >60 ml/min/1.73m2    Calcium 9.4 8.5 - 10.1 MG/DL    Bilirubin, total 0.5 0.2 - 1.0 MG/DL    ALT (SGPT) 20 12 - 78 U/L    AST (SGOT) 27 15 - 37 U/L    Alk. phosphatase 134 (H) 45 - 117 U/L    Protein, total 8.4 (H) 6.4 - 8.2 g/dL    Albumin 2.8 (L) 3.5 - 5.0 g/dL    Globulin 5.6 (H) 2.0 - 4.0 g/dL    A-G Ratio 0.5 (L) 1.1 - 2.2     PROTHROMBIN TIME + INR   Result Value Ref Range    INR 1.0 0.9 - 1.1      Prothrombin time 10.3 9.0 - 11.1 sec   PTT   Result Value Ref Range    aPTT 25.4 22.1 - 31.0 sec    aPTT, therapeutic range     58.0 - 77.0 SECS   LACTIC ACID   Result Value Ref Range    Lactic acid 3.2 (HH) 0.4 - 2.0 MMOL/L       No results found for this visit on 12/05/22. Assessment/ Plan:     1. Essential hypertension    - metoprolol tartrate (LOPRESSOR) 50 mg tablet; Take 1 Tablet by mouth two (2) times a day. Dispense: 60 Tablet; Refill: 0    2. Severe obesity (BMI 35.0-39. 9) with comorbidity ([facility] [address]  Discussed the patient's BMI with her. The BMI follow up plan is as follows:     dietary management education, guidance, and counseling  encourage exercise  monitor weight  prescribed dietary intake    An After Visit Summary was printed and given to the patient. 3. PVD (peripheral vascular disease) ([address]  Reapply rickey boot  every 7 days x 2 can do at home with family       Orders Placed This Encounter    metoprolol tartrate (LOPRESSOR) 50 mg tablet     Sig: Take 1 Tablet by mouth two (2) times a day. Dispense:  60 Tablet     Refill:  0         Verbal and written instructions (see AVS) provided. Patient expresses understanding of diagnosis and treatment plan.     Health Maintenance Due   Topic Date Due    Eye Exam Retinal or Dilated  Never done    Shingrix Vaccine Age 49> (1 of 2) Never done               Robyn Hall, SOCO-C

## 2022-12-20 ENCOUNTER — OFFICE VISIT (OUTPATIENT)
Dept: FAMILY MEDICINE CLINIC | Age: 87
End: 2022-12-20
Payer: MEDICARE

## 2022-12-20 VITALS
TEMPERATURE: 97.7 F | RESPIRATION RATE: 20 BRPM | WEIGHT: 207 LBS | HEART RATE: 82 BPM | SYSTOLIC BLOOD PRESSURE: 152 MMHG | HEIGHT: 64 IN | DIASTOLIC BLOOD PRESSURE: 62 MMHG | OXYGEN SATURATION: 96 % | BODY MASS INDEX: 35.34 KG/M2

## 2022-12-20 DIAGNOSIS — I87.2 VENOUS STASIS DERMATITIS OF BOTH LOWER EXTREMITIES: Primary | ICD-10-CM

## 2022-12-20 PROCEDURE — G8417 CALC BMI ABV UP PARAM F/U: HCPCS | Performed by: NURSE PRACTITIONER

## 2022-12-20 PROCEDURE — 1090F PRES/ABSN URINE INCON ASSESS: CPT | Performed by: NURSE PRACTITIONER

## 2022-12-20 PROCEDURE — G8432 DEP SCR NOT DOC, RNG: HCPCS | Performed by: NURSE PRACTITIONER

## 2022-12-20 PROCEDURE — 99213 OFFICE O/P EST LOW 20 MIN: CPT | Performed by: NURSE PRACTITIONER

## 2022-12-20 PROCEDURE — G8427 DOCREV CUR MEDS BY ELIG CLIN: HCPCS | Performed by: NURSE PRACTITIONER

## 2022-12-20 PROCEDURE — G8536 NO DOC ELDER MAL SCRN: HCPCS | Performed by: NURSE PRACTITIONER

## 2022-12-20 PROCEDURE — 1123F ACP DISCUSS/DSCN MKR DOCD: CPT | Performed by: NURSE PRACTITIONER

## 2022-12-20 PROCEDURE — 1101F PT FALLS ASSESS-DOCD LE1/YR: CPT | Performed by: NURSE PRACTITIONER

## 2022-12-20 NOTE — PROGRESS NOTES
1. \"Have you been to the ER, urgent care clinic since your last visit? Hospitalized since your last visit? \" No    2. \"Have you seen or consulted any other health care providers outside of the [address] [place] [address] since your last visit? \" No     3. For patients aged 39-70: Has the patient had a colonoscopy / FIT/ Cologuard? NA - based on age      If the patient is female:    4. For patients aged 41-77: Has the patient had a mammogram within the past 2 years? NA - based on age or sex      11. For patients aged 21-65: Has the patient had a pap smear?  NA - based on age or sex

## 2022-12-23 NOTE — PROGRESS NOTES
Subjective:      Maurizio Nicole is a 80 y.o. female who presents today for wound check. She has a venous stasis dermatitis  wound which is located on RLE an LLE . Using HOLLAND boot dressing bilaterally  RLE. No ulceration dry skin noted  LLE one pinpoint opening and dry skin noted. Current symptoms: wound healing as expected. Interventions to date: HOLLAND boot bilateral .    Objective:     Visit Vitals  BP (!) 152/62 (BP 1 Location: Left upper arm, BP Patient Position: Sitting, BP Cuff Size: Large adult)   Pulse 82   Temp 97.7 °F (36.5 °C) (Temporal)   Resp 20   Ht 5' 4\" (1.626 m)   Wt 207 lb (93.9 kg)   SpO2 96%   BMI 35.53 kg/m²       Wound:   wound margins intact and healing well. No signs of infection. Assessment:     Wound check. Interventions today removal of existing dressing  Reapplication of HOLLAND Boot. Plan:     1. Discussed appropriate home care of this wound. , Dispensed dressing supplies and instructions on their use. 2. Patient instructions were given. 3. Follow up: prn.   Massie Holstein NP-C

## 2022-12-23 NOTE — ACP (ADVANCE CARE PLANNING)
Has advanced medical directive scanned into chart. Reviewed at this visit. No changes.   Marya Puente NP-C

## 2023-01-04 ENCOUNTER — TELEPHONE (OUTPATIENT)
Dept: FAMILY MEDICINE CLINIC | Age: 88
End: 2023-01-04

## 2023-01-04 NOTE — TELEPHONE ENCOUNTER
Sal De La Torre Pamela's sister said they removed the bandages from her legs Monday and today there are large blisters filled oozing fluid. They want to know what they should. Please give a call back.

## 2023-01-05 ENCOUNTER — OFFICE VISIT (OUTPATIENT)
Dept: FAMILY MEDICINE CLINIC | Age: 88
End: 2023-01-05
Payer: MEDICARE

## 2023-01-05 DIAGNOSIS — I70.242 ATHEROSCLEROSIS OF NATIVE ARTERY OF BOTH LOWER EXTREMITIES WITH BILATERAL ULCERATION OF CALVES (HCC): ICD-10-CM

## 2023-01-05 DIAGNOSIS — I70.232 ATHEROSCLEROSIS OF NATIVE ARTERY OF BOTH LOWER EXTREMITIES WITH BILATERAL ULCERATION OF CALVES (HCC): ICD-10-CM

## 2023-01-05 DIAGNOSIS — N18.4 CKD (CHRONIC KIDNEY DISEASE) STAGE 4, GFR 15-29 ML/MIN (HCC): ICD-10-CM

## 2023-01-05 DIAGNOSIS — L97.311 VENOUS STASIS ULCER OF RIGHT ANKLE LIMITED TO BREAKDOWN OF SKIN WITHOUT VARICOSE VEINS (HCC): ICD-10-CM

## 2023-01-05 DIAGNOSIS — S81.802D LEG WOUND, LEFT, SUBSEQUENT ENCOUNTER: ICD-10-CM

## 2023-01-05 DIAGNOSIS — Z51.89 VISIT FOR WOUND CARE: ICD-10-CM

## 2023-01-05 DIAGNOSIS — I73.9 PVD (PERIPHERAL VASCULAR DISEASE) (HCC): Primary | ICD-10-CM

## 2023-01-05 DIAGNOSIS — E11.21 TYPE 2 DIABETES WITH NEPHROPATHY (HCC): ICD-10-CM

## 2023-01-05 DIAGNOSIS — S81.801D LEG WOUND, RIGHT, SUBSEQUENT ENCOUNTER: ICD-10-CM

## 2023-01-05 DIAGNOSIS — E66.01 SEVERE OBESITY (BMI 35.0-39.9) WITH COMORBIDITY (HCC): ICD-10-CM

## 2023-01-05 DIAGNOSIS — I87.2 VENOUS STASIS ULCER OF RIGHT ANKLE LIMITED TO BREAKDOWN OF SKIN WITHOUT VARICOSE VEINS (HCC): ICD-10-CM

## 2023-01-05 DIAGNOSIS — I87.8 VENOUS STASIS: ICD-10-CM

## 2023-01-05 DIAGNOSIS — R23.8 BLISTERS OF MULTIPLE SITES: ICD-10-CM

## 2023-01-05 PROCEDURE — G8432 DEP SCR NOT DOC, RNG: HCPCS | Performed by: NURSE PRACTITIONER

## 2023-01-05 PROCEDURE — 1123F ACP DISCUSS/DSCN MKR DOCD: CPT | Performed by: NURSE PRACTITIONER

## 2023-01-05 PROCEDURE — 1090F PRES/ABSN URINE INCON ASSESS: CPT | Performed by: NURSE PRACTITIONER

## 2023-01-05 PROCEDURE — 1101F PT FALLS ASSESS-DOCD LE1/YR: CPT | Performed by: NURSE PRACTITIONER

## 2023-01-05 PROCEDURE — 99214 OFFICE O/P EST MOD 30 MIN: CPT | Performed by: NURSE PRACTITIONER

## 2023-01-05 PROCEDURE — G8536 NO DOC ELDER MAL SCRN: HCPCS | Performed by: NURSE PRACTITIONER

## 2023-01-05 PROCEDURE — G8427 DOCREV CUR MEDS BY ELIG CLIN: HCPCS | Performed by: NURSE PRACTITIONER

## 2023-01-05 PROCEDURE — G8417 CALC BMI ABV UP PARAM F/U: HCPCS | Performed by: NURSE PRACTITIONER

## 2023-01-05 NOTE — PROGRESS NOTES
Chief Complaint   Patient presents with    Blister     On lower leg     1. \"Have you been to the ER, urgent care clinic since your last visit? Hospitalized since your last visit? \" No    2. \"Have you seen or consulted any other health care providers outside of the [address] [place] [address] since your last visit? \" No     3. For patients aged 39-70: Has the patient had a colonoscopy / FIT/ Cologuard? NA - based on age      If the patient is female:    4. For patients aged 41-77: Has the patient had a mammogram within the past 2 years? NA - based on age or sex      11. For patients aged 21-65: Has the patient had a pap smear?  NA - based on age or sex

## 2023-01-06 ENCOUNTER — TELEPHONE (OUTPATIENT)
Dept: FAMILY MEDICINE CLINIC | Age: 88
End: 2023-01-06

## 2023-01-06 VITALS
DIASTOLIC BLOOD PRESSURE: 74 MMHG | HEART RATE: 90 BPM | BODY MASS INDEX: 35.68 KG/M2 | OXYGEN SATURATION: 96 % | HEIGHT: 64 IN | WEIGHT: 209 LBS | RESPIRATION RATE: 24 BRPM | SYSTOLIC BLOOD PRESSURE: 138 MMHG | TEMPERATURE: 97.2 F

## 2023-01-06 NOTE — PROGRESS NOTES
Subjective:      Ang Lee is a 80 y.o. female who presents today for wound check. With Feliciano Serrano her sister. She has a ulceration due to PVD, venous stasis ulcer wounds which is located on the Right lower extremity  multiple blisters from mid calf to ankle varying in size. Two ulcers noted #1. Anterior surface of the right ankle 4x4 cm   #2. Ulcer lateral aspect of ankle 6 x3 cm . Both ulcers healthy pink skin beneath no sign of exudate,inflammation or induration. Left lower extremity from mid calf to ankle multiple blisters varying in size. No open areas noted. .     Family endorses  she will follow instructions elevating lower extremities and exercises with help . She tends to leave her legs dependent  and not do her exercises without help. We discussed having home health return to encourage her to get back to baseline and wound care. Encouraged family to seek caregiver support to have  care to fill in the gaps. Current symptoms: ulcers with clear drainage,   wound healing new ulcers as of today  Interventions to date: rickey boot wraps d/c at last visit. Objective:     Visit Vitals  /74 (BP 1 Location: Right arm, BP Patient Position: Sitting, BP Cuff Size: Large adult)   Pulse 90   Temp 97.2 °F (36.2 °C) (Temporal)   Resp 24   Ht 5' 4\" (1.626 m)   Wt 209 lb (94.8 kg)   SpO2 96%   BMI 35.87 kg/m²       Wound:   wound margins intact and healing well. Right lower extremity  multiple blisters from mid calf to ankle varying in size. Two ulcers noted #1. Anterior surface of the right ankle 4x4 cm   #2. Ulcer lateral aspect of ankle 6 x3 cm . Both ulcers healthy pink skin beneath no sign of exudate,inflammation or induration. Left lower extremity from mid calf to ankle multiple blisters varying in size. No open areas noted. Assessment:     Wound check. Interventions today visual inspection  cleansing with sterile saline solution  application of sterile dressing.   Xeroform to dry areas , DuoDerm to ulcers. Plan:     1. Discussed appropriate home care of this wound. , Dispensed dressing supplies and instructions on their use., Wound redressed. 2. Patient instructions were given. 3. Follow up: 2 weeks or sooner. Establish with with home care anticipated.   Arnulfo POOLE

## 2023-01-06 NOTE — PROGRESS NOTES
Subjective:     Ang Lee is a 80 y.o. female who presents today with the following:  Chief Complaint   Patient presents with    Blister     On lower leg       Patient Active Problem List   Diagnosis Code    Hypertension I10    Hypercholesterolemia E78.00    Chronic kidney disease N18.9    Type 2 diabetes with nephropathy (HCC) E11.21    Severe obesity (BMI 35.0-39. 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 Blood pressure normotensive. depression screening addressed not at risk    abuse screening addressed denies    learning assessment addressed reviewed nurses notes    fall risk addressed not at risk    HM: addressed    ROS:  Gen: denies fever, chills, fatigue, weight loss, weight gain  HEENT:denies blurry vision, nasal congestion, sore throat  Resp: denies dypsnea, cough, wheezing  CV: denies chest pain radiating to the jaws or arms, palpitations, lower extremity edema  Abd: denies nausea, vomiting, diarrhea, constipation  Neuro: denies numbness/tingling  Endo: denies polyuria, polydipsia, heat/cold intolerance  Heme: no lymphadenopathy    No Known Allergies      Current Outpatient Medications:     metoprolol tartrate (LOPRESSOR) 50 mg tablet, Take 1 Tablet by mouth two (2) times a day., Disp: 60 Tablet, Rfl: 0    amLODIPine-benazepril (LOTREL) 5-10 mg per capsule, Take 1 Capsule by mouth daily. , Disp: , Rfl:     hydroCHLOROthiazide (HYDRODIURIL) 25 mg tablet, Take 1 Tablet by mouth daily. , Disp: 90 Tablet, Rfl: 1    ammonium lactate (AMLACTIN EX), by Apply Externally route., Disp: , Rfl: polyethylene glycol (MIRALAX) 17 gram/dose powder, , Disp: , Rfl:     multivitamin (ONE A DAY) tablet, Take 1 Tablet by mouth daily. , Disp: , Rfl:     acetaminophen (TYLENOL) 500 mg tablet, Take 1 Tablet by mouth every six (6) hours as needed for Pain., Disp: 30 Tablet, Rfl: 0    Past Medical History:   Diagnosis Date    Chronic kidney disease     Hypercholesterolemia     Hypertension        Past Surgical History:   Procedure Laterality Date    HX CATARACT REMOVAL Right 03/27/2018    HX CATARACT REMOVAL  05/2018    left    HX CYST REMOVAL  1976    ear,       Social History     Tobacco Use   Smoking Status Never   Smokeless Tobacco Never       Social History     Socioeconomic History    Marital status:    Tobacco Use    Smoking status: Never    Smokeless tobacco: Never   Vaping Use    Vaping Use: Never used   Substance and Sexual Activity    Alcohol use: No   Other Topics Concern     Service No    Blood Transfusions No    Caffeine Concern No    Occupational Exposure No    Hobby Hazards No    Sleep Concern No    Stress Concern No    Weight Concern No    Special Diet No    Back Care No    Exercise Yes    Bike Helmet No    Seat Belt Yes    Self-Exams Yes     Social Determinants of Health     Financial Resource Strain: Low Risk     Difficulty of Paying Living Expenses: Not hard at all   Food Insecurity: No Food Insecurity    Worried About Running Out of Food in the Last Year: Never true    Ran Out of Food in the Last Year: Never true   Transportation Needs: No Transportation Needs    Lack of Transportation (Medical): No    Lack of Transportation (Non-Medical): No       Family History   Problem Relation Age of Onset    Hypertension Mother          Objective:     Visit Vitals  BP (!) 140/76 (BP 1 Location: Left upper arm, BP Patient Position: Sitting, BP Cuff Size: Large adult)   Pulse 90   Temp 97.2 °F (36.2 °C) (Temporal)   Resp 24   Ht 5' 4\" (1.626 m)   Wt 209 lb (94.8 kg)   SpO2 96%   BMI 35.87 kg/m² Body mass index is 35.87 kg/m². General: Alert and oriented. No acute distress. Well nourished  HEENT :  Ears:TMs are normal. Canals are clear. Eyes: pupils equal, round, react to light and accommodation. Extra ocular movements intact. Nose: patent. Mouth and throat is clear. Neck:supple full range of motion no thyromegaly. Trachea midline, No carotid bruits. No significant lymphadenopathy  Lungs[de-identified] clear to auscultation without wheezes, rales, or rhonchi. Heart :RRR, S1 & S2 are normal intensity. No murmur; no gallop  Abdomen: bowel sounds active. No tenderness, guarding, rebound, masses, hepatic or spleen enlargement  Back: no CVA tenderness. Extremities: without clubbing or cyanosis   Right lower extremity  multiple blisters from mid calf to ankle varying in size. Two ulcers noted #1. Anterior surface of the right ankle 4x4 cm   #2. Ulcer lateral aspect of ankle 6 x3 cm . Both ulcers healthy pink skin beneath no sign of exudate,inflammation or induration. Left lower extremity from mid calf to ankle multiple blisters varying in size. No open areas noted. Pulses: radial and femoral pulses are normal  Neuro: HMF intact. Cranial nerves II through XII grossly normal.  Motor: is 5 over 5 and symmetrical.   Deep tendon reflexes: +2 equal  Psych:appropriate behavior, mood, affect and judgement.    Results for orders placed or performed during the hospital encounter of 06/24/22   CBC WITH AUTOMATED DIFF   Result Value Ref Range    WBC 10.8 3.6 - 11.0 K/uL    RBC 3.76 (L) 3.80 - 5.20 M/uL    HGB 11.0 (L) 11.5 - 16.0 g/dL    HCT 35.2 35.0 - 47.0 %    MCV 93.6 80.0 - 99.0 FL    MCH 29.3 26.0 - 34.0 PG    MCHC 31.3 30.0 - 36.5 g/dL    RDW 15.8 (H) 11.5 - 14.5 %    PLATELET 736 390 - 311 K/uL    MPV 10.7 8.9 - 12.9 FL    NRBC 0.0 0  WBC    ABSOLUTE NRBC 0.00 0.00 - 0.01 K/uL    NEUTROPHILS 73 32 - 75 %    LYMPHOCYTES 16 12 - 49 %    MONOCYTES 9 5 - 13 %    EOSINOPHILS 1 0 - 7 %    BASOPHILS 1 0 - 1 % IMMATURE GRANULOCYTES 0 0.0 - 0.5 %    ABS. NEUTROPHILS 7.8 1.8 - 8.0 K/UL    ABS. LYMPHOCYTES 1.7 0.8 - 3.5 K/UL    ABS. MONOCYTES 1.0 0.0 - 1.0 K/UL    ABS. EOSINOPHILS 0.2 0.0 - 0.4 K/UL    ABS. BASOPHILS 0.1 0.0 - 0.1 K/UL    ABS. IMM. GRANS. 0.0 0.00 - 0.04 K/UL    DF AUTOMATED     METABOLIC PANEL, COMPREHENSIVE   Result Value Ref Range    Sodium 141 136 - 145 mmol/L    Potassium 5.0 3.5 - 5.1 mmol/L    Chloride 107 97 - 108 mmol/L    CO2 22 21 - 32 mmol/L    Anion gap 12 5 - 15 mmol/L    Glucose 112 (H) 65 - 100 mg/dL    BUN 35 (H) 6 - 20 MG/DL    Creatinine 2.08 (H) 0.55 - 1.02 MG/DL    BUN/Creatinine ratio 17 12 - 20      GFR est AA 27 (L) >60 ml/min/1.73m2    GFR est non-AA 22 (L) >60 ml/min/1.73m2    Calcium 9.4 8.5 - 10.1 MG/DL    Bilirubin, total 0.5 0.2 - 1.0 MG/DL    ALT (SGPT) 20 12 - 78 U/L    AST (SGOT) 27 15 - 37 U/L    Alk. phosphatase 134 (H) 45 - 117 U/L    Protein, total 8.4 (H) 6.4 - 8.2 g/dL    Albumin 2.8 (L) 3.5 - 5.0 g/dL    Globulin 5.6 (H) 2.0 - 4.0 g/dL    A-G Ratio 0.5 (L) 1.1 - 2.2     PROTHROMBIN TIME + INR   Result Value Ref Range    INR 1.0 0.9 - 1.1      Prothrombin time 10.3 9.0 - 11.1 sec   PTT   Result Value Ref Range    aPTT 25.4 22.1 - 31.0 sec    aPTT, therapeutic range     58.0 - 77.0 SECS   LACTIC ACID   Result Value Ref Range    Lactic acid 3.2 (HH) 0.4 - 2.0 MMOL/L       No results found for this visit on 01/05/23. Assessment/ Plan:     1. PVD (peripheral vascular disease) ([facility]    - [address]    2. Venous stasis    - REFERRAL TO HOME HEALTH    3. Leg wound, right, subsequent encounter    - [address]    4. Leg wound, left, subsequent encounter    - [address]    5. Visit for wound care    - REFERRAL TO HOME HEALTH    6. 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 Severe obesity (BMI 35.0-39. 9) with comorbidity ([facility]  Stable no change in plan of care     8. Type 2 diabetes with nephropathy (HCC)  Stable diet controled    9. CKD (chronic kidney disease) stage 4, GFR 15-29 ml/min (HCC)  Stable no change in plan of care    10. Venous stasis ulcer of right ankle limited to breakdown of skin without varicose veins (HCC)      - REFERRAL TO HOME HEALTH    11. Blisters of multiple sites    - [address]      Orders Placed This Encounter    REFERRAL TO HOME HEALTH     Referral Priority:   Routine     Referral Type:   Home Health Evaluation     Referral Reason:   Continuity of Care     Requested Specialty:   [address] [place] [address]     Number of Visits Requested:   1         Verbal and written instructions (see AVS) provided. Patient expresses understanding of diagnosis and treatment plan. Health Maintenance Due   Topic Date Due    Shingles Vaccine (1 of 2) Never done    COVID-19 Vaccine (5 - Booster for Pfizer series) 12/22/2022       Follow up in 2 week  and prn as needed. Wound care supplies provided.          CYDNEY Jaquez

## 2023-01-06 NOTE — TELEPHONE ENCOUNTER
Please send all notes from 1/5/2023 to [facility] for wound care.    Agustin COLMENARESC

## 2023-01-12 ENCOUNTER — OFFICE VISIT (OUTPATIENT)
Dept: FAMILY MEDICINE CLINIC | Age: 88
End: 2023-01-12
Payer: MEDICARE

## 2023-01-12 VITALS
TEMPERATURE: 97.1 F | OXYGEN SATURATION: 97 % | SYSTOLIC BLOOD PRESSURE: 124 MMHG | RESPIRATION RATE: 20 BRPM | DIASTOLIC BLOOD PRESSURE: 60 MMHG | HEART RATE: 62 BPM | BODY MASS INDEX: 35.87 KG/M2 | HEIGHT: 64 IN

## 2023-01-12 DIAGNOSIS — S81.802D LEG WOUND, LEFT, SUBSEQUENT ENCOUNTER: Primary | ICD-10-CM

## 2023-01-12 DIAGNOSIS — I10 ESSENTIAL HYPERTENSION: ICD-10-CM

## 2023-01-12 DIAGNOSIS — S81.801D LEG WOUND, RIGHT, SUBSEQUENT ENCOUNTER: ICD-10-CM

## 2023-01-12 NOTE — PROGRESS NOTES
Identified pt with two pt identifiers(name and ). Reviewed record in preparation for visit and have obtained necessary documentation. Chief Complaint   Patient presents with    Wound Check      Vitals:    23 1504   BP: 124/60   Pulse: 62   Resp: 20   Temp: 97.1 °F (36.2 °C)   TempSrc: Temporal   SpO2: 97%   Height: 5' 4\" (1.626 m)   PainSc:   0 - No pain   PainLoc: Leg       Coordination of Care Questionnaire:  :       1. \"Have you been to the ER, urgent care clinic since your last visit? Hospitalized since your last visit? \" No    2. \"Have you seen or consulted any other health care providers outside of the [address] [place] [address] since your last visit? \" No     3. For patients aged 39-70: Has the patient had a colonoscopy / FIT/ Cologuard? NA - based on age      If the patient is female:    4. For patients aged 41-77: Has the patient had a mammogram within the past 2 years? NA - based on age or sex      11. For patients aged 21-65: Has the patient had a pap smear?  NA - based on age or sex

## 2023-01-13 RX ORDER — AMLODIPINE AND BENAZEPRIL HYDROCHLORIDE 5; 10 MG/1; MG/1
1 CAPSULE ORAL DAILY
Qty: 90 CAPSULE | Refills: 3 | Status: SHIPPED | OUTPATIENT
Start: 2023-01-13

## 2023-01-13 RX ORDER — HYDROCHLOROTHIAZIDE 25 MG/1
25 TABLET ORAL DAILY
Qty: 90 TABLET | Refills: 1 | Status: SHIPPED | OUTPATIENT
Start: 2023-01-13

## 2023-01-13 RX ORDER — CHLORHEXIDINE GLUCONATE 4 G/100ML
SOLUTION TOPICAL
Qty: 1652 ML | Refills: 0 | Status: SHIPPED | OUTPATIENT
Start: 2023-01-16

## 2023-01-13 RX ORDER — METOPROLOL TARTRATE 50 MG/1
50 TABLET ORAL 2 TIMES DAILY
Qty: 180 TABLET | Refills: 3 | Status: SHIPPED | OUTPATIENT
Start: 2023-01-13

## 2023-01-13 RX ORDER — DOXYCYCLINE 100 MG/1
100 TABLET ORAL 2 TIMES DAILY
Qty: 20 TABLET | Refills: 0 | Status: SHIPPED | OUTPATIENT
Start: 2023-01-13 | End: 2023-01-23

## 2023-01-15 LAB
BACTERIA SPEC CULT: ABNORMAL
GRAM STN SPEC: ABNORMAL
SERVICE CMNT-IMP: ABNORMAL
SERVICE CMNT-IMP: ABNORMAL

## 2023-01-15 NOTE — PROGRESS NOTES
Subjective:      Kari Stephenson is a 80 y.o. female who presents today for wound check. She has a multiple venous stasis ulcers wound which is located on both right and left calves. Current symptoms: blistering  ruptured blister  erythema: mild  drainage: clear with odor. Interventions to date: dressing changed 1 day ago  Vasoline to dry rough areas. Xerafoam removed. Objective:     Visit Vitals  /60 (BP 1 Location: Left upper arm, BP Patient Position: Sitting, BP Cuff Size: Adult)   Pulse 62   Temp 97.1 °F (36.2 °C) (Temporal)   Resp 20   Ht 5' 4\" (1.626 m)   SpO2 97%   BMI 35.87 kg/m²       Wound:   appears to have worsened since last recheck  intact blister noted  Ruptured blisters noted no exudate , has an odor. Assessment:     Wound check. Interventions today visual inspection  cleansing with sterile saline  solution  irrigation with sterile saline solution  Alessandra boots applied. Plan:     1. Discussed appropriate home care of this wound., Home health to establish care on Monday. Wound cultures will treat as appropriate  2. Patient instructions were given. 3. Follow up: in 2 weeks or sooner .     Destiny POOLE

## 2023-01-16 RX ORDER — LEVOFLOXACIN 500 MG/1
500 TABLET, FILM COATED ORAL DAILY
Qty: 10 TABLET | Refills: 0 | Status: SHIPPED | OUTPATIENT
Start: 2023-01-16 | End: 2023-01-26

## 2023-01-27 ENCOUNTER — PATIENT OUTREACH (OUTPATIENT)
Dept: CASE MANAGEMENT | Age: 88
End: 2023-01-27

## 2023-01-27 NOTE — PROGRESS NOTES
Ambulatory Care Management Note    Date/Time:  1/27/2023 4:04 PM    This patient was received as a referral from Provider. Ambulatory Care Manager outreached to patient today to offer care management services. Introduction to self and role of care manager provided. Patient declined care management services at this time. No follow up call scheduled at this time.   Patient has Ambulatory Care Manager's contact number for any questions or concerns.   Oralee Cos

## 2023-02-08 ENCOUNTER — HOSPITAL ENCOUNTER (OUTPATIENT)
Dept: LAB | Age: 88
Discharge: HOME | End: 2023-02-08

## 2023-02-15 ENCOUNTER — HOSPITAL ENCOUNTER (OUTPATIENT)
Dept: WOUND CARE | Age: 88
Discharge: HOME | End: 2023-02-15
Payer: MEDICARE

## 2023-02-15 VITALS
TEMPERATURE: 97.5 F | HEART RATE: 63 BPM | RESPIRATION RATE: 18 BRPM | DIASTOLIC BLOOD PRESSURE: 63 MMHG | SYSTOLIC BLOOD PRESSURE: 141 MMHG

## 2023-02-15 DIAGNOSIS — R60.0 BILATERAL LEG EDEMA: ICD-10-CM

## 2023-02-15 DIAGNOSIS — L97.922 NON-PRESSURE CHRONIC ULCER OF LEFT LOWER LEG WITH FAT LAYER EXPOSED (HCC): ICD-10-CM

## 2023-02-15 DIAGNOSIS — L97.912 NON-PRESSURE CHRONIC ULCER OF RIGHT LOWER LEG, WITH FAT LAYER EXPOSED (HCC): ICD-10-CM

## 2023-02-15 DIAGNOSIS — N18.4 CKD (CHRONIC KIDNEY DISEASE) STAGE 4, GFR 15-29 ML/MIN (HCC): Primary | ICD-10-CM

## 2023-02-15 PROCEDURE — 99203 OFFICE O/P NEW LOW 30 MIN: CPT | Performed by: SURGERY

## 2023-02-15 RX ORDER — BACITRACIN ZINC AND POLYMYXIN B SULFATE 500; 1000 [USP'U]/G; [USP'U]/G
OINTMENT TOPICAL ONCE
Status: CANCELLED | OUTPATIENT
Start: 2023-02-15 | End: 2023-02-15

## 2023-02-15 RX ORDER — LIDOCAINE HYDROCHLORIDE 40 MG/ML
SOLUTION TOPICAL ONCE
Status: CANCELLED | OUTPATIENT
Start: 2023-02-15 | End: 2023-02-15

## 2023-02-15 RX ORDER — SODIUM HYPOCHLORITE 1.25 MG/ML
1 SOLUTION TOPICAL DAILY
COMMUNITY

## 2023-02-15 RX ORDER — LIDOCAINE 40 MG/G
CREAM TOPICAL ONCE
Status: CANCELLED | OUTPATIENT
Start: 2023-02-15 | End: 2023-02-15

## 2023-02-15 RX ORDER — CLOBETASOL PROPIONATE 0.5 MG/G
OINTMENT TOPICAL ONCE
Status: CANCELLED | OUTPATIENT
Start: 2023-02-15 | End: 2023-02-15

## 2023-02-15 RX ORDER — BACITRACIN 500 [USP'U]/G
OINTMENT TOPICAL ONCE
Status: CANCELLED | OUTPATIENT
Start: 2023-02-15 | End: 2023-02-15

## 2023-02-15 RX ORDER — SILVER SULFADIAZINE 10 G/1000G
CREAM TOPICAL ONCE
Status: CANCELLED | OUTPATIENT
Start: 2023-02-15 | End: 2023-02-15

## 2023-02-15 RX ORDER — GENTAMICIN SULFATE 1 MG/G
OINTMENT TOPICAL ONCE
Status: CANCELLED | OUTPATIENT
Start: 2023-02-15 | End: 2023-02-15

## 2023-02-15 RX ORDER — LIDOCAINE HYDROCHLORIDE 20 MG/ML
JELLY TOPICAL ONCE
Status: CANCELLED | OUTPATIENT
Start: 2023-02-15 | End: 2023-02-15

## 2023-02-15 RX ORDER — BETAMETHASONE DIPROPIONATE 0.5 MG/G
OINTMENT TOPICAL ONCE
Status: CANCELLED | OUTPATIENT
Start: 2023-02-15 | End: 2023-02-15

## 2023-02-15 RX ORDER — MUPIROCIN 20 MG/G
OINTMENT TOPICAL ONCE
Status: CANCELLED | OUTPATIENT
Start: 2023-02-15 | End: 2023-02-15

## 2023-02-15 RX ORDER — TRIAMCINOLONE ACETONIDE 1 MG/G
OINTMENT TOPICAL ONCE
Status: CANCELLED | OUTPATIENT
Start: 2023-02-15 | End: 2023-02-15

## 2023-02-15 NOTE — WOUND CARE
02/15/23 1524   Wound Leg lower Left #1 02/15/23   Date First Assessed/Time First Assessed: 02/15/23 1522   Present on Hospital Admission: Yes  Wound Approximate Age at First Assessment (Weeks): 104 weeks  Primary Wound Type: Venous Ulcer  Location: Leg lower  Wound Location Orientation: Left  Wound D... Wound Image     Wound Etiology Venous   Dressing Status Breakthrough drainage noted   Cleansed Cleansed with saline; Soap and water   Dressing/Treatment   (Dakins gauze, Dry gauze, ABD, RG, Coban)   Wound Length (cm) 34 cm   Wound Width (cm) 35 cm   Wound Depth (cm) 0.1 cm   Wound Surface Area (cm^2) 1190 cm^2   Wound Volume (cm^3) 119 cm^3   Wound Assessment Slough;Pink/red   Drainage Amount Large   Drainage Description Serous   Wound Odor Moderate   Destiny-Wound/Incision Assessment Fragile   Edges Undefined edges   Wound Thickness Description Full thickness   Wound Leg lower Right #2 02/15/23   Date First Assessed/Time First Assessed: 02/15/23 1523   Present on Hospital Admission: Yes  Wound Approximate Age at First Assessment (Weeks): 104 weeks  Primary Wound Type: Venous Ulcer  Location: Leg lower  Wound Location Orientation: (c) Right  Wo. .. Wound Image     Wound Etiology Venous   Dressing Status Breakthrough drainage noted   Cleansed Cleansed with saline; Soap and water   Dressing/Treatment   (Dakins gauze, Dry gauze, ABD, RG, Coban)   Wound Length (cm) 29 cm   Wound Width (cm) 38 cm   Wound Depth (cm) 0.1 cm   Wound Surface Area (cm^2) 1102 cm^2   Wound Volume (cm^3) 110.2 cm^3   Wound Assessment Raiford/red;Slough   Drainage Amount Large   Drainage Description Serous   Wound Odor Moderate   Destiny-Wound/Incision Assessment Fragile   Edges Undefined edges   Wound Thickness Description Full thickness   Visit Vitals  BP (!) 141/63 (BP 1 Location: Left upper arm, BP Patient Position: At rest;Supine)   Pulse 63   Temp 97.5 °F (36.4 °C)   Resp 18

## 2023-02-15 NOTE — H&P
[address]  HISTORY AND PHYSICAL    Name:  Nely Dean  MR#:  [id number]  :  1933  ACCOUNT #:  [de-identified]  ADMIT DATE:  02/15/2023    HISTORY OF PRESENT ILLNESS:  The patient is an 17-year-old woman who presents to the wound care center regarding ulcerations on both lower legs. The patient is not sure when she developed these ulcerations. She was referred here by her nurse practitioner. The patient has been treated with  Chapin Brill boot. The patient has developed a large amount of drainage from her legs. The legs are painful in the area of the ulcerations. The patient reports that she can stand and walk with a walker. Observation by the nurses in the wound care center indicated the patient has very limited mobility. The patient has history of prediabetes. She does not report cardiac symptoms. There is no history of MI or coronary intervention. The patient denies shortness of breath. The patient has been sleeping in a lift chair in a reclined position with her feet substantially lower than her heart. She reportedly has tried to sleep in bed recently, but found her legs became more painful after about 2 hours and she got up to the chair. The patient does not presently take a diuretic. By description, she drinks moderate amounts of fluid. The patient reports frequently having relatively poor oral intake of food. She also describes eating a relatively high salt diet. She lives alone and prepares her own food. Past medical history includes chronic kidney disease stage IV, hypercholesterolemia, hypertension. The patient has never smoked. PHYSICAL EXAMINATION:  VITAL SIGNS:  Blood pressure 141/63, pulse 63, respirations 18, temperature 97.5. GENERAL:  The patient is an alert elderly woman in no acute distress. HEAD AND NECK:  No jaundice. LUNGS:  Clear bilaterally without rales, rhonchi or wheezes.   HEART:  Regular without murmur, gallop or rub.  NEURO:  The patient is alert. She moves all extremities. Facial movement is symmetrical.  Speech is normal.  The patient is quite articulate. EXTREMITIES:  Examination of the right lower extremity reveals 2+ palpable dorsalis pedis pulse. There was 3+ pitting edema in the right lower extremity up to the level of the groin. There were multiple small ulcerations on the left lower leg as a cluster measured at 29 x 38 x 0.9 cm. Tissues were quite boggy. Examination of the left lower extremity revealed 2+ palpable dorsalis pedis pulse. There was 3+ pitting edema of the entire lower extremity up to the level of the groin. There were scattered small ulcers on the lower leg as a cluster of 34 x 35 x 0.1 cm in total dimension. Tissues were quite boggy. The patient has ulcerations in right and left lower leg secondary to severe lower extremity edema. I sent a message to the patient's primary provider. I think the patient probably needs very significant diuresis, but may need to be evaluated by a nephrologist as a part of that process. She does have evidence on prior lab testing of significant chronic kidney disease. I recommended the patient to take a low salt diet. Effect of salt intake on edema was reviewed. The patient was given information regarding a low-salt diet. The patient should avoid excessive fluid intake. Her history does not suggest that she drinks large amounts of fluid. I have recommended the patient achieve a position for sleeping at night in which her feet and heart are at the same level. The site of sleeping, whether it is a lift chair, sofa, or bed, is not important as long she achieves that position. Dressings ordered:  Right and left legs  -   Wash lower legs with Dakin's solution, then saline. Apply Xeroform over ulcers, ABD, roll gauze. Apply Ace wraps from base of toes to just above the ankle then from above ankle to upper calf.     Home Health will change dressing three times per week. The patient will follow up in wound care center in 2 weeks. DIAGNOSES:  Nonpressure ulcers right lower leg with fat layer exposed, nonpressure ulcers left lower leg with fat layer exposed, bilateral leg edema, chronic kidney disease.       Nelson Vidal MD      [id number]  D:  02/15/2023 16:06  T:  02/15/2023 18:45  JOB #:  [id number]

## 2023-02-15 NOTE — DISCHARGE INSTRUCTIONS
Discharge Instructions [address] [id number]  Telephone: [phone]    NAME:  Temi Steele OF BIRTH:  7/24/1933  MEDICAL RECORD NUMBER:  [id number]  DATE:  2/15/2023  WOUND CARE ORDERS:  Bilateral lower leg wounds :Cleanse with dakin sol and leave it for 2-3 min. Rinse with saline , apply primary dressing xeroform cover with secondary dressing   abd pad, roll gauze, and Tape . Apply Ace banage  Pt./pcg/HH nurse to change (freq) 3 x week and as needed for compromise. F/U in 2 week. TREATMENT ORDERS:    Elevate leg(s) above the level of the heart when sitting. Avoid prolonged standing in one place. Do no get dressing/wrap wet. Follow Diet as prescribed:   [x] Diet as tolerated: [] Calorie Diabetic Diet: Low carb and no Sugar [x] No Added Salt:  [x] Increase Protein: [] Limit the amount of liquid you are drinking and avoid drinking in between meals           Return Appointment:  [x] Return Appointment: With DR Ten Lewis  in  [facility])  [] Nurse Visit :  days  [] Ordered tests:    Electronically signed Pamela Wagner RN on 2/15/2023 at 3:48 PM     [facility] Information: Should you experience any significant changes in your wound(s) or have questions about your wound care, please contact the [address] [place] [address] at [address] [place] [address] 8:00 am - 4:30. If you need help with your wound outside these hours and cannot wait until we are again available, contact your PCP or go to the hospital emergency room. PLEASE NOTE: IF YOU ARE UNABLE TO OBTAIN WOUND SUPPLIES, CONTINUE TO USE THE SUPPLIES YOU HAVE AVAILABLE UNTIL YOU ARE ABLE TO REACH US. IT IS MOST IMPORTANT TO KEEP THE WOUND COVERED AT ALL TIMES.      Physician Signature:_______________________    Date: ___________ Time:  ____________

## 2023-02-16 ENCOUNTER — TELEPHONE (OUTPATIENT)
Dept: FAMILY MEDICINE CLINIC | Age: 88
End: 2023-02-16

## 2023-02-16 ENCOUNTER — VIRTUAL VISIT (OUTPATIENT)
Dept: FAMILY MEDICINE CLINIC | Age: 88
End: 2023-02-16
Payer: MEDICARE

## 2023-02-16 DIAGNOSIS — N18.4 STAGE 4 CHRONIC KIDNEY DISEASE (HCC): ICD-10-CM

## 2023-02-16 DIAGNOSIS — I73.9 PVD (PERIPHERAL VASCULAR DISEASE) (HCC): Primary | ICD-10-CM

## 2023-02-16 DIAGNOSIS — S81.801D LEG WOUND, RIGHT, SUBSEQUENT ENCOUNTER: ICD-10-CM

## 2023-02-16 DIAGNOSIS — I10 ESSENTIAL HYPERTENSION: ICD-10-CM

## 2023-02-16 DIAGNOSIS — S81.802D LEG WOUND, LEFT, SUBSEQUENT ENCOUNTER: ICD-10-CM

## 2023-02-16 PROCEDURE — 99443 PR PHYS/QHP TELEPHONE EVALUATION 21-30 MIN: CPT | Performed by: NURSE PRACTITIONER

## 2023-02-16 PROCEDURE — 1101F PT FALLS ASSESS-DOCD LE1/YR: CPT | Performed by: NURSE PRACTITIONER

## 2023-02-16 RX ORDER — FUROSEMIDE 40 MG/1
40 TABLET ORAL DAILY
Qty: 90 TABLET | Refills: 1 | Status: SHIPPED | OUTPATIENT
Start: 2023-02-16

## 2023-02-16 RX ORDER — LIDOCAINE HYDROCHLORIDE 20 MG/ML
JELLY TOPICAL ONCE
OUTPATIENT
Start: 2023-02-16 | End: 2023-02-16

## 2023-02-16 RX ORDER — MUPIROCIN 20 MG/G
OINTMENT TOPICAL ONCE
OUTPATIENT
Start: 2023-02-16 | End: 2023-02-16

## 2023-02-16 RX ORDER — CLOBETASOL PROPIONATE 0.5 MG/G
OINTMENT TOPICAL ONCE
OUTPATIENT
Start: 2023-02-16 | End: 2023-02-16

## 2023-02-16 RX ORDER — TRIAMCINOLONE ACETONIDE 1 MG/G
OINTMENT TOPICAL ONCE
OUTPATIENT
Start: 2023-02-16 | End: 2023-02-16

## 2023-02-16 RX ORDER — SILVER SULFADIAZINE 10 G/1000G
CREAM TOPICAL ONCE
OUTPATIENT
Start: 2023-02-16 | End: 2023-02-16

## 2023-02-16 RX ORDER — BACITRACIN ZINC AND POLYMYXIN B SULFATE 500; 1000 [USP'U]/G; [USP'U]/G
OINTMENT TOPICAL ONCE
OUTPATIENT
Start: 2023-02-16 | End: 2023-02-16

## 2023-02-16 RX ORDER — LIDOCAINE HYDROCHLORIDE 40 MG/ML
SOLUTION TOPICAL ONCE
OUTPATIENT
Start: 2023-02-16 | End: 2023-02-16

## 2023-02-16 RX ORDER — BETAMETHASONE DIPROPIONATE 0.5 MG/G
OINTMENT TOPICAL ONCE
OUTPATIENT
Start: 2023-02-16 | End: 2023-02-16

## 2023-02-16 RX ORDER — BACITRACIN 500 [USP'U]/G
OINTMENT TOPICAL ONCE
OUTPATIENT
Start: 2023-02-16 | End: 2023-02-16

## 2023-02-16 RX ORDER — LIDOCAINE 40 MG/G
CREAM TOPICAL ONCE
OUTPATIENT
Start: 2023-02-16 | End: 2023-02-16

## 2023-02-16 RX ORDER — GENTAMICIN SULFATE 1 MG/G
OINTMENT TOPICAL ONCE
OUTPATIENT
Start: 2023-02-16 | End: 2023-02-16

## 2023-02-16 RX ORDER — LEVOFLOXACIN 500 MG/1
500 TABLET, FILM COATED ORAL DAILY
Qty: 10 TABLET | Refills: 0 | Status: SHIPPED | OUTPATIENT
Start: 2023-02-16 | End: 2023-02-26

## 2023-02-16 NOTE — Clinical Note
Poor connection during virtual visit please schedule a 1 month follow up for edema and wound check .

## 2023-02-16 NOTE — TELEPHONE ENCOUNTER
S/w Brittany Riggs informed of lasix and McNeer appointment.  She was looking for an antibiotic from the wound culture also please advise

## 2023-02-16 NOTE — PROGRESS NOTES
Chief Complaint   Patient presents with    Peripheral Edema     Legs swelling       YES Answers must have Comments  1. \"Have you been to the ER, urgent care clinic since your last visit? Hospitalized since your last visit? \"    [] YES   [x] NO       2. Have you seen or consulted any other health care providers outside of [address] [place] [address] since your last visit?     [] YES   [x] NO       3. For patients aged 39-70: Have you had a colorectal cancer screening such as a colonoscopy/FIT/Cologuard? Nurse/CMA to request records if not in chart   [] YES   [] NO   [x] NA, based on age    If the patient is female:      4. For female patients aged 41-77: [place] you had a mammogram in the last two years?  Nurse/CMA to request records if not in chart   [] YES   [] NO   [x] NA, based on age    11. For female patients aged 21-65: [place] you had a pap smear?   Nurse/CMA to request records if not in chart   [] YES   [] NO  [x] NA, based on age

## 2023-02-17 ENCOUNTER — TELEPHONE (OUTPATIENT)
Dept: FAMILY MEDICINE CLINIC | Age: 88
End: 2023-02-17

## 2023-02-17 NOTE — TELEPHONE ENCOUNTER
ANGELINA Ovalle regarding the furosemide PA, transferred to the Pharmacy Benefits Dept [address] Per her, the PA medication will need further review via the [facility] Medicare Part B Plan Benefits. This could take up to 72 hrs, information will be sent to the provider with a decision. The Reference call # is [id number].

## 2023-02-17 NOTE — PROGRESS NOTES
Yonatan Wesley is a 80 y.o. female, evaluated via audio-only technology on 2/16/2023 for Peripheral Edema (Legs swelling/)  . We discussed her visit with the wound clinic. Recommendation from wound clinic diuresis and referral to nephrology to manage CKD . We discussed starting lasix 40 mg po daily. Wound culture pending. Preliminary culture gram negative rods. Assessment & Plan:   Diagnoses and all orders for this visit:    1. PVD (peripheral vascular disease) ([address]    2. Essential hypertension  -     furosemide (LASIX) 40 mg tablet; Take 1 Tablet by mouth daily. Indications: visible water retention    3. Leg wound, left, subsequent encounter    4. Leg wound, right, subsequent encounter    5. Stage 4 chronic kidney disease (HCC)  -     REFERRAL TO NEPHROLOGY    Other orders  -     levoFLOXacin (LEVAQUIN) 500 mg tablet; Take 1 Tablet by mouth daily for 10 days. The complexity of medical decision making for this visit is moderate       12  Subjective:       Prior to Admission medications    Medication Sig Start Date End Date Taking? Authorizing Provider   furosemide (LASIX) 40 mg tablet Take 1 Tablet by mouth daily. Indications: visible water retention 2/16/23  Yes Kathye Rinne, NP   levoFLOXacin (LEVAQUIN) 500 mg tablet Take 1 Tablet by mouth daily for 10 days. 2/16/23 2/26/23 Yes Kathye Rinne, NP   sodium hypochlorite (Dakin's Solution) 0.125 % soln external solution Apply 1 Each to affected area daily. Yes Provider, Historical   metoprolol tartrate (LOPRESSOR) 50 mg tablet Take 1 Tablet by mouth two (2) times a day. 1/13/23  Yes Kathye Rinne, NP   amLODIPine-benazepril (LOTREL) 5-10 mg per capsule Take 1 Capsule by mouth daily. 1/13/23  Yes Kathye Rinne, NP   ammonium lactate (AMLACTIN EX) by Apply Externally route. Yes Provider, Historical   acetaminophen (TYLENOL) 500 mg tablet Take 1 Tablet by mouth every six (6) hours as needed for Pain.  6/17/22  Yes Kathye Rinne, NP hydroCHLOROthiazide (HYDRODIURIL) 25 mg tablet Take 1 Tablet by mouth daily. Patient not taking: No sig reported 1/13/23   Ant Fermin NP   polyethylene glycol [facility]) 17 gram/dose powder  8/10/22   Provider, Historical   multivitamin (ONE A DAY) tablet Take 1 Tablet by mouth daily. 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, with fat layer exposed ([address] L97.912    Non-pressure chronic ulcer of left lower leg with fat layer exposed ([address] L97.922    Bilateral leg edema R60.0     Patient Active Problem List    Diagnosis Date Noted    Non-pressure chronic ulcer of right lower leg, with fat layer exposed ([address] 02/15/2023    Non-pressure chronic ulcer of left lower leg with fat layer exposed ([address] 02/15/2023    Bilateral leg edema 02/15/2023    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 9) with comorbidity ([address] 03/22/2018    Pre-diabetes 03/22/2018    Hypertension     Hypercholesterolemia     Chronic kidney disease      Current Outpatient Medications   Medication Sig Dispense Refill    furosemide (LASIX) 40 mg tablet Take 1 Tablet by mouth daily. Indications: visible water retention 90 Tablet 1    levoFLOXacin (LEVAQUIN) 500 mg tablet Take 1 Tablet by mouth daily for 10 days. 10 Tablet 0    sodium hypochlorite (Dakin's Solution) 0.125 % soln external solution Apply 1 Each to affected area daily. metoprolol tartrate (LOPRESSOR) 50 mg tablet Take 1 Tablet by mouth two (2) times a day. 180 Tablet 3    amLODIPine-benazepril (LOTREL) 5-10 mg per capsule Take 1 Capsule by mouth daily. 90 Capsule 3    ammonium lactate (AMLACTIN EX) by Apply Externally route. acetaminophen (TYLENOL) 500 mg tablet Take 1 Tablet by mouth every six (6) hours as needed for Pain. 30 Tablet 0    hydroCHLOROthiazide (HYDRODIURIL) 25 mg tablet Take 1 Tablet by mouth daily. (Patient not taking: No sig reported) 90 Tablet 1    polyethylene glycol (MIRALAX) 17 gram/dose powder  (Patient not taking: No sig reported)      multivitamin (ONE A DAY) tablet Take 1 Tablet by mouth daily. (Patient not taking: No sig reported)       No Known Allergies  Past Medical History:   Diagnosis Date    Chronic kidney disease     Hypercholesterolemia     Hypertension      Past Surgical History:   Procedure Laterality Date    HX CATARACT REMOVAL Right 03/27/2018    HX CATARACT REMOVAL  05/2018    left    HX CYST REMOVAL  1976    ear,     Family History   Problem Relation Age of Onset    Hypertension Mother      Social History     Tobacco Use    Smoking status: Never    Smokeless tobacco: Never   Substance Use Topics    Alcohol use: No       ROS  Pertinent items are noted in the HPI. Patient-Reported Temperature: no fever reported       June Henson was evaluated through a patient-initiated, synchronous (real-time) audio only encounter. She (or guardian if applicable) is aware that it is a billable service, which includes applicable co-pays, with coverage as determined by her insurance carrier.  This visit was conducted with the patient's (and/or Vish Abt guardian's) verbal consent. She has not had a related appointment within my department in the past 7 days or scheduled within the next 24 hours. The patient was located in a state where the provider was licensed to provide care. The patient was located at: Home: [address]  The provider was located at:  Facility (Appt Department): [address] [address]    Total Time: minutes: 21-30 minutes    Kelsie Morgan NP

## 2023-02-22 ENCOUNTER — HOSPITAL ENCOUNTER (OUTPATIENT)
Dept: WOUND CARE | Age: 88
Discharge: HOME | End: 2023-02-22
Payer: MEDICARE

## 2023-02-22 VITALS
DIASTOLIC BLOOD PRESSURE: 56 MMHG | SYSTOLIC BLOOD PRESSURE: 113 MMHG | HEART RATE: 68 BPM | TEMPERATURE: 97.1 F | RESPIRATION RATE: 18 BRPM

## 2023-02-22 DIAGNOSIS — L97.922 NON-PRESSURE CHRONIC ULCER OF LEFT LOWER LEG WITH FAT LAYER EXPOSED (HCC): ICD-10-CM

## 2023-02-22 DIAGNOSIS — N18.4 CKD (CHRONIC KIDNEY DISEASE) STAGE 4, GFR 15-29 ML/MIN (HCC): ICD-10-CM

## 2023-02-22 DIAGNOSIS — L97.912 NON-PRESSURE CHRONIC ULCER OF RIGHT LOWER LEG, WITH FAT LAYER EXPOSED (HCC): Primary | ICD-10-CM

## 2023-02-22 DIAGNOSIS — R60.0 BILATERAL LEG EDEMA: ICD-10-CM

## 2023-02-22 PROCEDURE — 29581 APPL MULTLAYER CMPRN SYS LEG: CPT

## 2023-02-22 PROCEDURE — 99214 OFFICE O/P EST MOD 30 MIN: CPT | Performed by: SURGERY

## 2023-02-22 RX ORDER — TRIAMCINOLONE ACETONIDE 1 MG/G
OINTMENT TOPICAL ONCE
OUTPATIENT
Start: 2023-02-22 | End: 2023-02-22

## 2023-02-22 RX ORDER — CLOBETASOL PROPIONATE 0.5 MG/G
OINTMENT TOPICAL ONCE
OUTPATIENT
Start: 2023-02-22 | End: 2023-02-22

## 2023-02-22 RX ORDER — LIDOCAINE 40 MG/G
CREAM TOPICAL ONCE
OUTPATIENT
Start: 2023-02-22 | End: 2023-02-22

## 2023-02-22 RX ORDER — LIDOCAINE HYDROCHLORIDE 20 MG/ML
JELLY TOPICAL ONCE
OUTPATIENT
Start: 2023-02-22 | End: 2023-02-22

## 2023-02-22 RX ORDER — LIDOCAINE HYDROCHLORIDE 40 MG/ML
SOLUTION TOPICAL ONCE
OUTPATIENT
Start: 2023-02-22 | End: 2023-02-22

## 2023-02-22 RX ORDER — SILVER SULFADIAZINE 10 G/1000G
CREAM TOPICAL ONCE
OUTPATIENT
Start: 2023-02-22 | End: 2023-02-22

## 2023-02-22 RX ORDER — MUPIROCIN 20 MG/G
OINTMENT TOPICAL ONCE
OUTPATIENT
Start: 2023-02-22 | End: 2023-02-22

## 2023-02-22 RX ORDER — BETAMETHASONE DIPROPIONATE 0.5 MG/G
OINTMENT TOPICAL ONCE
OUTPATIENT
Start: 2023-02-22 | End: 2023-02-22

## 2023-02-22 RX ORDER — GENTAMICIN SULFATE 1 MG/G
OINTMENT TOPICAL ONCE
OUTPATIENT
Start: 2023-02-22 | End: 2023-02-22

## 2023-02-22 RX ORDER — BACITRACIN 500 [USP'U]/G
OINTMENT TOPICAL ONCE
OUTPATIENT
Start: 2023-02-22 | End: 2023-02-22

## 2023-02-22 RX ORDER — BACITRACIN ZINC AND POLYMYXIN B SULFATE 500; 1000 [USP'U]/G; [USP'U]/G
OINTMENT TOPICAL ONCE
OUTPATIENT
Start: 2023-02-22 | End: 2023-02-22

## 2023-02-22 NOTE — WOUND CARE
02/22/23 1454   Wound Leg lower Left #1 02/15/23   Date First Assessed/Time First Assessed: 02/15/23 1522   Present on Hospital Admission: Yes  Wound Approximate Age at First Assessment (Weeks): 104 weeks  Primary Wound Type: Venous Ulcer  Location: Leg lower  Wound Location Orientation: Left  Wound D... Dressing Status New dressing applied   Dressing/Treatment Xeroform;ABD pad  (2 layer with calamine)   Wound Leg lower Right #2 02/15/23   Date First Assessed/Time First Assessed: 02/15/23 1523   Present on Hospital Admission: Yes  Wound Approximate Age at First Assessment (Weeks): 104 weeks  Primary Wound Type: Venous Ulcer  Location: Leg lower  Wound Location Orientation: (c) Right  Wo. .. Dressing Status New dressing applied   Dressing/Treatment Xeroform;ABD pad  (2 layer with calamine)   Multilayer Compression Wrap   (Not Unna) Below the Knee    NAME:  Garima Meléndez OF BIRTH:  7/24/1933  MEDICAL RECORD NUMBER:  [id number]  DATE:  2/22/2023    Removed old Multilayer wrap if indicated and wash leg with mild soap/water. Applied moisturizing agent to dry skin as needed. Applied primary and secondary dressing as ordered. Applied multilayered dressing below the knee to right lower leg. Applied multilayered dressing below the knee to left lower leg. Instructed patient/caregiver not to remove dressing and to keep it clean and dry. Instructed patient/caregiver on complications to report to provider, such as pain, numbness in toes, heavy drainage, and slippage of dressing. Instructed patient on purpose of compression dressing and on activity and exercise recommendations.     Response to treatment: Well tolerated by patient       Electronically signed by Yinka Lyon RN on 2/22/2023 at 2:55 PM

## 2023-02-22 NOTE — WOUND CARE
02/22/23 1410   Wound Leg lower Left #1 02/15/23   Date First Assessed/Time First Assessed: 02/15/23 1522   Present on Hospital Admission: Yes  Wound Approximate Age at First Assessment (Weeks): 104 weeks  Primary Wound Type: Venous Ulcer  Location: Leg lower  Wound Location Orientation: Left  Wound D... Wound Image     Wound Etiology Venous   Dressing Status Breakthrough drainage noted   Cleansed Soap and water   Wound Length (cm) 29.2 cm   Wound Width (cm) 33 cm   Wound Depth (cm) 0.11 cm   Wound Surface Area (cm^2) 963.6 cm^2   Change in Wound Size % 19.03   Wound Volume (cm^3) 105.996 cm^3   Wound Healing % 11   Wound Assessment Slough;Pink/red   Drainage Amount Copious   Drainage Description Serous   Wound Odor Moderate   Destiny-Wound/Incision Assessment Fragile   Edges Undefined edges   Wound Thickness Description Full thickness   Wound Leg lower Right #2 02/15/23   Date First Assessed/Time First Assessed: 02/15/23 1523   Present on Hospital Admission: Yes  Wound Approximate Age at First Assessment (Weeks): 104 weeks  Primary Wound Type: Venous Ulcer  Location: Leg lower  Wound Location Orientation: (c) Right  Wo. ..    Wound Image     Wound Etiology Venous   Dressing Status Breakthrough drainage noted   Cleansed Soap and water   Wound Length (cm) 33 cm   Wound Width (cm) 34 cm   Wound Depth (cm) 0.1 cm   Wound Surface Area (cm^2) 1122 cm^2   Change in Wound Size % -1.81   Wound Volume (cm^3) 112.2 cm^3   Wound Healing % -2   Wound Assessment [place]/red;Slough   Drainage Amount Copious   Drainage Description Serous   Wound Odor Moderate   Destiny-Wound/Incision Assessment Fragile   Edges Undefined edges   Wound Thickness Description Full thickness   Pain 1   Pain Scale 1 Numeric (0 - 10)   Pain Intensity 1 6     Visit Vitals  BP (!) 113/56   Pulse 68   Temp 97.1 °F (36.2 °C)   Resp 18

## 2023-02-22 NOTE — PROGRESS NOTES
HISTORY OF PRESENT ILLNESS:  The patient is an 70-year-old woman who presents to the wound care center regarding ulcerations on both lower legs. The patient was first seen at the [address] on 2/15/2023. The patient is not sure when she developed these ulcerations. She was referred here by her nurse practitioner. The patient has been treated with  Justo dupont. The patient has developed a large amount of drainage from her legs. The legs are painful in the area of the ulcerations. The patient reports that she can stand and walk with a walker. Observation by the nurses in the wound care center indicated the patient has very limited mobility. The patient lives by herself. The patient has history of prediabetes. She does not report cardiac symptoms. There is no history of MI or coronary intervention. The patient denies shortness of breath. The patient has been sleeping in a lift chair in a reclined position with her feet substantially lower than her heart. She reportedly has tried to sleep in bed recently, but found her legs became more painful after about 2 hours and she got up to the chair. The patient did not take a diuretic as of 2/15/2023. By description, she drinks multiple quarts of water per day. I sent message to her primary Anuj Farrar NP; on 2/16/2023 she started her on Lasix 40 mg po daily and gave a referral to Dr. Joselyn Reed, nephrology. The patient reports frequently having relatively poor oral intake of food. She also describes eating a relatively high salt diet. She lives alone and prepares her own food. Past medical history includes chronic kidney disease stage IV, hypercholesterolemia, hypertension. The patient has never smoked. Dressings as of 2/15/2023:  Right and left legs  -   Wash lower legs with Dakin's solution, then saline. Apply Xeroform over ulcers, ABD, roll gauze.   Apply Ace wraps from base of toes to just above the ankle then from above ankle to upper calf. Home Health will change dressing three times per week. PHYSICAL EXAMINATION:    GENERAL:  The patient is an alert elderly woman in no acute distress. EXTREMITIES:  Examination of the right lower extremity reveals 2+ palpable dorsalis pedis pulse. There was 2+ pitting edema in the right lower extremity up to the level of the groin. There were multiple small ulcerations on the left lower leg as a cluster. Tissues were  boggy. Edema a bit decreased compared to first visit. Examination of the left lower extremity revealed 2+ palpable dorsalis pedis pulse. There was 2+ pitting edema of the entire lower extremity up to the level of the groin. There were scattered small ulcers on the lower leg as a cluster of 34 x 35 x 0.1 cm in total dimension. Tissues were boggy. Edema a bit decreased compared to first visit. The patient has ulcerations in right and left lower leg secondary to severe lower extremity edema. I recommended the patient to take a low salt diet. Effect of salt intake on edema was reviewed. The patient was given information regarding a low-salt diet. The patient should avoid excessive fluid intake. Her history does not suggest that she drinks large amounts of fluid. I recommended she limit fluid intake to 2 quarts per day. Se nephrologist ASAP. I have recommended the patient achieve a position for sleeping at night in which her feet and heart are at the same level. The site of sleeping, whether it is a lift chair, sofa, or bed, is not important as long she achieves that position. Dressings ordered:  Right and left legs  -   Wash lower legs with soap and water, including feet and toes. Xeroform placed over the ulcers. Two layer compression wrap with calamine placed form base of toes to upper calf. Home Health will change dressing three times per week.         The patient will follow up in wound care center in 2 weeks. DIAGNOSES:  Nonpressure ulcers right lower leg with fat layer exposed, nonpressure ulcers left lower leg with fat layer exposed, bilateral leg edema, chronic kidney disease.     L97.912, L97.922, R60.0, N18.4        Denise Odom MD

## 2023-02-22 NOTE — DISCHARGE INSTRUCTIONS
Discharge Instructions/Wound Orders  [address], [address] [place] [address]  Jocelynemoana maría, [id number]  Telephone: [phone]    NAME:  Vasu Lala OF BIRTH:  7/24/1933  MEDICAL RECORD NUMBER:  [id number]  DATE:  02/22/23  Wound Care Orders:  Bilateral lower leg wounds :Cleanse with Soap and water , apply primary dressing xeroform cover with secondary dressing   abd pad . Apply 2 layers with Calamine  Pt./pcg/HH nurse to change (freq) 3 x week and as needed for compromise. F/U in 2 week. Dietary:  [] Diet as tolerated: [] Diabetic Diet:Low carb and no Sugar   [] No Added Salt:[] Increase Protein:   [x] Other:Limit the amount of liquid you are drinking and avoid drinking in between meals   Activity:  [] Activity as tolerated:     Return Appointment:  [] Return Appointment: With DR Brenda Puentes  in  [facility])  [] Ordered tests:     Dr. Zenaida Verdugo Nephrology [phone]. Electronically signed Lobo Madrigal RN on 2/22/2023 at 2:46 PM   [facility] Information: Should you experience any significant changes in your wound(s) or have questions about your wound care, please contact the [address] [place] [address] at [address] [place] [address] 8:00 am - 4:30. If you need help with your wound outside these hours and cannot wait until we are again available, contact your PCP or go to the hospital emergency room. PLEASE NOTE: IF YOU ARE UNABLE TO OBTAIN WOUND SUPPLIES, CONTINUE TO USE THE SUPPLIES YOU HAVE AVAILABLE UNTIL YOU ARE ABLE TO REACH US. IT IS MOST IMPORTANT TO KEEP THE WOUND COVERED AT ALL TIMES.     Physician Signature:_______________________    Date: ___________ Time:  ____________

## 2023-03-08 ENCOUNTER — APPOINTMENT (OUTPATIENT)
Dept: GENERAL RADIOLOGY | Age: 88
End: 2023-03-08
Attending: EMERGENCY MEDICINE
Payer: MEDICARE

## 2023-03-08 ENCOUNTER — HOSPITAL ENCOUNTER (EMERGENCY)
Age: 88
Discharge: ANOTHER HOSPITAL | End: 2023-03-09
Attending: EMERGENCY MEDICINE
Payer: MEDICARE

## 2023-03-08 DIAGNOSIS — E87.5 ACUTE HYPERKALEMIA: ICD-10-CM

## 2023-03-08 DIAGNOSIS — N17.9 AKI (ACUTE KIDNEY INJURY) (HCC): Primary | ICD-10-CM

## 2023-03-08 LAB
ANION GAP SERPL CALC-SCNC: 11 MMOL/L (ref 5–15)
ANION GAP SERPL CALC-SCNC: 13 MMOL/L (ref 5–15)
APPEARANCE UR: CLEAR
BACTERIA URNS QL MICRO: NEGATIVE /HPF
BASOPHILS # BLD: 0.1 K/UL (ref 0–0.1)
BASOPHILS NFR BLD: 1 % (ref 0–1)
BILIRUB UR QL: NEGATIVE
BUN SERPL-MCNC: 114 MG/DL (ref 6–20)
BUN SERPL-MCNC: 116 MG/DL (ref 6–20)
BUN/CREAT SERPL: 30 (ref 12–20)
BUN/CREAT SERPL: 32 (ref 12–20)
CALCIUM SERPL-MCNC: 9 MG/DL (ref 8.5–10.1)
CALCIUM SERPL-MCNC: 9.4 MG/DL (ref 8.5–10.1)
CHLORIDE SERPL-SCNC: 108 MMOL/L (ref 97–108)
CHLORIDE SERPL-SCNC: 111 MMOL/L (ref 97–108)
CO2 SERPL-SCNC: 19 MMOL/L (ref 21–32)
CO2 SERPL-SCNC: 22 MMOL/L (ref 21–32)
COLOR UR: NORMAL
CREAT SERPL-MCNC: 3.57 MG/DL (ref 0.55–1.02)
CREAT SERPL-MCNC: 3.84 MG/DL (ref 0.55–1.02)
DIFFERENTIAL METHOD BLD: ABNORMAL
EOSINOPHIL # BLD: 0.1 K/UL (ref 0–0.4)
EOSINOPHIL NFR BLD: 1 % (ref 0–7)
EPITH CASTS URNS QL MICRO: NORMAL /LPF
ERYTHROCYTE [DISTWIDTH] IN BLOOD BY AUTOMATED COUNT: 19 % (ref 11.5–14.5)
GLUCOSE BLD STRIP.AUTO-MCNC: 52 MG/DL (ref 65–117)
GLUCOSE BLD STRIP.AUTO-MCNC: 61 MG/DL (ref 65–117)
GLUCOSE BLD STRIP.AUTO-MCNC: 70 MG/DL (ref 65–117)
GLUCOSE BLD STRIP.AUTO-MCNC: 90 MG/DL (ref 65–117)
GLUCOSE SERPL-MCNC: 125 MG/DL (ref 65–100)
GLUCOSE SERPL-MCNC: 137 MG/DL (ref 65–100)
GLUCOSE UR STRIP.AUTO-MCNC: NEGATIVE MG/DL
HCT VFR BLD AUTO: 32.2 % (ref 35–47)
HGB BLD-MCNC: 10.5 G/DL (ref 11.5–16)
HGB UR QL STRIP: NEGATIVE
IMM GRANULOCYTES # BLD AUTO: 0.2 K/UL (ref 0–0.04)
IMM GRANULOCYTES NFR BLD AUTO: 1 % (ref 0–0.5)
KETONES UR QL STRIP.AUTO: NEGATIVE MG/DL
LEUKOCYTE ESTERASE UR QL STRIP.AUTO: NEGATIVE
LYMPHOCYTES # BLD: 1 K/UL (ref 0.8–3.5)
LYMPHOCYTES NFR BLD: 7 % (ref 12–49)
MCH RBC QN AUTO: 29.5 PG (ref 26–34)
MCHC RBC AUTO-ENTMCNC: 32.6 G/DL (ref 30–36.5)
MCV RBC AUTO: 90.4 FL (ref 80–99)
MONOCYTES # BLD: 1.1 K/UL (ref 0–1)
MONOCYTES NFR BLD: 8 % (ref 5–13)
NEUTS SEG # BLD: 11.3 K/UL (ref 1.8–8)
NEUTS SEG NFR BLD: 82 % (ref 32–75)
NITRITE UR QL STRIP.AUTO: NEGATIVE
NRBC # BLD: 0 K/UL (ref 0–0.01)
NRBC BLD-RTO: 0 PER 100 WBC
PH UR STRIP: 6 (ref 5–8)
PLATELET # BLD AUTO: 273 K/UL (ref 150–400)
PMV BLD AUTO: 11.2 FL (ref 8.9–12.9)
POTASSIUM SERPL-SCNC: 5.4 MMOL/L (ref 3.5–5.1)
POTASSIUM SERPL-SCNC: 5.9 MMOL/L (ref 3.5–5.1)
PROT UR STRIP-MCNC: NEGATIVE MG/DL
RBC # BLD AUTO: 3.56 M/UL (ref 3.8–5.2)
RBC #/AREA URNS HPF: NORMAL /HPF (ref 0–5)
SERVICE CMNT-IMP: ABNORMAL
SERVICE CMNT-IMP: ABNORMAL
SERVICE CMNT-IMP: NORMAL
SERVICE CMNT-IMP: NORMAL
SODIUM SERPL-SCNC: 140 MMOL/L (ref 136–145)
SODIUM SERPL-SCNC: 144 MMOL/L (ref 136–145)
SP GR UR REFRACTOMETRY: 1.02 (ref 1–1.03)
UA: UC IF INDICATED,UAUC: NORMAL
UROBILINOGEN UR QL STRIP.AUTO: 0.2 EU/DL (ref 0.2–1)
WBC # BLD AUTO: 13.8 K/UL (ref 3.6–11)
WBC URNS QL MICRO: NORMAL /HPF (ref 0–4)

## 2023-03-08 PROCEDURE — 73562 X-RAY EXAM OF KNEE 3: CPT

## 2023-03-08 PROCEDURE — 96375 TX/PRO/DX INJ NEW DRUG ADDON: CPT

## 2023-03-08 PROCEDURE — 99285 EMERGENCY DEPT VISIT HI MDM: CPT

## 2023-03-08 PROCEDURE — 82962 GLUCOSE BLOOD TEST: CPT

## 2023-03-08 PROCEDURE — 51701 INSERT BLADDER CATHETER: CPT

## 2023-03-08 PROCEDURE — 36415 COLL VENOUS BLD VENIPUNCTURE: CPT

## 2023-03-08 PROCEDURE — 77030019903 HC CATH URET INT BARD -A

## 2023-03-08 PROCEDURE — 74011250637 HC RX REV CODE- 250/637: Performed by: EMERGENCY MEDICINE

## 2023-03-08 PROCEDURE — 71045 X-RAY EXAM CHEST 1 VIEW: CPT

## 2023-03-08 PROCEDURE — 81001 URINALYSIS AUTO W/SCOPE: CPT

## 2023-03-08 PROCEDURE — 96365 THER/PROPH/DIAG IV INF INIT: CPT

## 2023-03-08 PROCEDURE — 80048 BASIC METABOLIC PNL TOTAL CA: CPT

## 2023-03-08 PROCEDURE — 74011250636 HC RX REV CODE- 250/636: Performed by: EMERGENCY MEDICINE

## 2023-03-08 PROCEDURE — 74011000250 HC RX REV CODE- 250: Performed by: EMERGENCY MEDICINE

## 2023-03-08 PROCEDURE — 96374 THER/PROPH/DIAG INJ IV PUSH: CPT

## 2023-03-08 PROCEDURE — 74011636637 HC RX REV CODE- 636/637: Performed by: EMERGENCY MEDICINE

## 2023-03-08 PROCEDURE — 85025 COMPLETE CBC W/AUTO DIFF WBC: CPT

## 2023-03-08 RX ORDER — DEXTROSE MONOHYDRATE 100 MG/ML
250 INJECTION, SOLUTION INTRAVENOUS ONCE
Status: COMPLETED | OUTPATIENT
Start: 2023-03-08 | End: 2023-03-08

## 2023-03-08 RX ORDER — ONDANSETRON 2 MG/ML
4 INJECTION INTRAMUSCULAR; INTRAVENOUS
Status: COMPLETED | OUTPATIENT
Start: 2023-03-08 | End: 2023-03-08

## 2023-03-08 RX ORDER — ACETAMINOPHEN 500 MG
1000 TABLET ORAL ONCE
Status: COMPLETED | OUTPATIENT
Start: 2023-03-08 | End: 2023-03-08

## 2023-03-08 RX ORDER — SODIUM CHLORIDE 9 MG/ML
75 INJECTION, SOLUTION INTRAVENOUS CONTINUOUS
Status: DISCONTINUED | OUTPATIENT
Start: 2023-03-08 | End: 2023-03-09 | Stop reason: HOSPADM

## 2023-03-08 RX ORDER — SODIUM POLYSTYRENE SULFONATE 15 G/60ML
30 SUSPENSION ORAL; RECTAL
Status: COMPLETED | OUTPATIENT
Start: 2023-03-08 | End: 2023-03-08

## 2023-03-08 RX ORDER — SODIUM BICARBONATE 84 MG/ML
50 INJECTION, SOLUTION INTRAVENOUS
Status: COMPLETED | OUTPATIENT
Start: 2023-03-08 | End: 2023-03-08

## 2023-03-08 RX ADMIN — SODIUM BICARBONATE 50 MEQ: 84 INJECTION, SOLUTION INTRAVENOUS at 14:23

## 2023-03-08 RX ADMIN — DEXTROSE MONOHYDRATE 250 ML: 100 INJECTION, SOLUTION INTRAVENOUS at 14:17

## 2023-03-08 RX ADMIN — SODIUM POLYSTYRENE SULFONATE 30 G: 15 SUSPENSION ORAL; RECTAL at 14:43

## 2023-03-08 RX ADMIN — ACETAMINOPHEN 1000 MG: 500 TABLET ORAL at 17:46

## 2023-03-08 RX ADMIN — Medication 10 UNITS: at 14:18

## 2023-03-08 RX ADMIN — ONDANSETRON 4 MG: 2 INJECTION INTRAMUSCULAR; INTRAVENOUS at 14:11

## 2023-03-08 RX ADMIN — SODIUM CHLORIDE 75 ML/HR: 9 INJECTION, SOLUTION INTRAVENOUS at 14:14

## 2023-03-08 NOTE — ED TRIAGE NOTES
Parveen Vogel and struck left knee. No loc but outside for 20 minutes. With assisted had some weight bearing with much assistance.

## 2023-03-08 NOTE — ED PROVIDER NOTES
EMERGENCY DEPARTMENT HISTORY AND PHYSICAL EXAM      Date: 3/8/2023  Patient Name: Jaime Epstein    History of Presenting Illness     Chief Complaint   Patient presents with    Fall       History Provided By: Patient    HPI: Jaime Epstein, 80 y.o. female with past medical history listed below, presents via EMS to the ED with cc of fall. Patient reports she was getting ready to drive to [facility] for a wound care appointment. She was outside standing on her steps when her left knee gave out and she slipped to the ground. No significant injury. She normally ambulates with a walker. She has chronic stasis wounds/ulcerations of both lower legs. On chart review she was first seen in the [facility] wound care clinic on February 15, 2023. She has bilateral lower leg edema and some small scattered ulcers. They have been wrapping her legs from base of the toes to her upper calf as of late. With 2 layer compression wraps. She denies any significant trauma from the fall. She reports she just collapsed to the ground. No significant injury. She has no pain complaints. She does state its been slightly more difficult to walk with her walker since they have been wrapping her legs so tightly. She has a history of CKD, high cholesterol and hypertension. There are no other complaints, changes, or physical findings at this time. PCP: Penny Valdovinos NP    No current facility-administered medications on file prior to encounter. Current Outpatient Medications on File Prior to Encounter   Medication Sig Dispense Refill    furosemide (LASIX) 40 mg tablet Take 1 Tablet by mouth daily. Indications: visible water retention 90 Tablet 1    sodium hypochlorite (Dakin's Solution) 0.125 % soln external solution Apply 1 Each to affected area daily. metoprolol tartrate (LOPRESSOR) 50 mg tablet Take 1 Tablet by mouth two (2) times a day.  180 Tablet 3    amLODIPine-benazepril (LOTREL) 5-10 mg per capsule Take 1 Capsule by mouth daily. 90 Capsule 3    hydroCHLOROthiazide (HYDRODIURIL) 25 mg tablet Take 1 Tablet by mouth daily. (Patient not taking: No sig reported) 90 Tablet 1    ammonium lactate (AMLACTIN EX) by Apply Externally route. polyethylene glycol (MIRALAX) 17 gram/dose powder  (Patient not taking: No sig reported)      multivitamin (ONE A DAY) tablet Take 1 Tablet by mouth daily. (Patient not taking: No sig reported)      acetaminophen (TYLENOL) 500 mg tablet Take 1 Tablet by mouth every six (6) hours as needed for Pain. 30 Tablet 0       Past History     Past Medical History:  Past Medical History:   Diagnosis Date    Chronic kidney disease     Hypercholesterolemia     Hypertension        Past Surgical History:  Past Surgical History:   Procedure Laterality Date    HX CATARACT REMOVAL Right 03/27/2018    HX CATARACT REMOVAL  05/2018    left    HX CYST REMOVAL  1976    ear,       Family History:  Family History   Problem Relation Age of Onset    Hypertension Mother        Social History:  Social History     Tobacco Use    Smoking status: Never    Smokeless tobacco: Never   Vaping Use    Vaping Use: Never used   Substance Use Topics    Alcohol use: No    Drug use: Never       Allergies:  No Known Allergies      Review of Systems   Review of Systems   Constitutional:  Negative for activity change, chills and fever. HENT:  Negative for congestion and sore throat. Eyes:  Negative for pain and redness. Respiratory:  Negative for cough, chest tightness and shortness of breath. Cardiovascular:  Negative for chest pain and palpitations. Gastrointestinal:  Negative for abdominal pain, diarrhea, nausea and vomiting. Genitourinary:  Negative for dysuria, frequency and urgency. Musculoskeletal:  Negative for back pain and neck pain. Skin:  Positive for wound. Negative for rash. Neurological:  Negative for syncope, light-headedness and headaches.    Psychiatric/Behavioral: Negative for confusion. All other systems reviewed and are negative. Physical Exam     Physical Exam  Constitutional:       General: She is not in acute distress. Appearance: She is well-developed. She is not diaphoretic. Comments: Calm and pleasant elderly black female   HENT:      Head: Normocephalic and atraumatic. Eyes:      General: No scleral icterus. Conjunctiva/sclera: Conjunctivae normal.      Pupils: Pupils are equal, round, and reactive to light. Cardiovascular:      Rate and Rhythm: Normal rate and regular rhythm. Pulmonary:      Effort: Pulmonary effort is normal.      Breath sounds: Normal breath sounds. Abdominal:      Palpations: Abdomen is soft. Tenderness: There is no abdominal tenderness. Musculoskeletal:         General: Normal range of motion. Skin:     General: Skin is warm. Comments: Compression dressings from toes to knee bilaterally. Wiggles toes. 2+ DP and PT pulses. Neurological:      Mental Status: She is alert and oriented to person, place, and time. Psychiatric:         Behavior: Behavior normal.             Diagnostic Study Results     Labs -     Recent Results (from the past 12 hour(s))   CBC WITH AUTOMATED DIFF    Collection Time: 03/08/23 12:48 PM   Result Value Ref Range    WBC 13.8 (H) 3.6 - 11.0 K/uL    RBC 3.56 (L) 3.80 - 5.20 M/uL    HGB 10.5 (L) 11.5 - 16.0 g/dL    HCT 32.2 (L) 35.0 - 47.0 %    MCV 90.4 80.0 - 99.0 FL    MCH 29.5 26.0 - 34.0 PG    MCHC 32.6 30.0 - 36.5 g/dL    RDW 19.0 (H) 11.5 - 14.5 %    PLATELET 624 278 - 301 K/uL    MPV 11.2 8.9 - 12.9 FL    NRBC 0.0 0  WBC    ABSOLUTE NRBC 0.00 0.00 - 0.01 K/uL    NEUTROPHILS 82 (H) 32 - 75 %    LYMPHOCYTES 7 (L) 12 - 49 %    MONOCYTES 8 5 - 13 %    EOSINOPHILS 1 0 - 7 %    BASOPHILS 1 0 - 1 %    IMMATURE GRANULOCYTES 1 (H) 0.0 - 0.5 %    ABS. NEUTROPHILS 11.3 (H) 1.8 - 8.0 K/UL    ABS. LYMPHOCYTES 1.0 0.8 - 3.5 K/UL    ABS. MONOCYTES 1.1 (H) 0.0 - 1.0 K/UL    ABS. EOSINOPHILS 0.1 0.0 - 0.4 K/UL    ABS. BASOPHILS 0.1 0.0 - 0.1 K/UL    ABS. IMM. GRANS. 0.2 (H) 0.00 - 0.04 K/UL    DF AUTOMATED     METABOLIC PANEL, BASIC    Collection Time: 03/08/23 12:48 PM   Result Value Ref Range    Sodium 140 136 - 145 mmol/L    Potassium 5.9 (H) 3.5 - 5.1 mmol/L    Chloride 108 97 - 108 mmol/L    CO2 19 (L) 21 - 32 mmol/L    Anion gap 13 5 - 15 mmol/L    Glucose 137 (H) 65 - 100 mg/dL     (H) 6 - 20 MG/DL    Creatinine 3.84 (H) 0.55 - 1.02 MG/DL    BUN/Creatinine ratio 30 (H) 12 - 20      eGFR 11 (L) >60 ml/min/1.73m2    Calcium 9.4 8.5 - 10.1 MG/DL   URINALYSIS W/ REFLEX CULTURE    Collection Time: 03/08/23  1:30 PM    Specimen: Urine   Result Value Ref Range    Color YELLOW/STRAW      Appearance CLEAR CLEAR      Specific gravity 1.025 1.003 - 1.030      pH (UA) 6.0 5.0 - 8.0      Protein Negative NEG mg/dL    Glucose Negative NEG mg/dL    Ketone Negative NEG mg/dL    Bilirubin Negative NEG      Blood Negative NEG      Urobilinogen 0.2 0.2 - 1.0 EU/dL    Nitrites Negative NEG      Leukocyte Esterase Negative NEG      WBC 0-4 0 - 4 /hpf    RBC 0-5 0 - 5 /hpf    Epithelial cells FEW FEW /lpf    Bacteria Negative NEG /hpf    UA:UC IF INDICATED CULTURE NOT INDICATED BY UA RESULT CNI         Radiologic Studies -   XR KNEE LT 3 V   Final Result   Minimal degenerative changes without acute abnormality. XR CHEST SNGL V    (Results Pending)     CT Results  (Last 48 hours)      None          CXR Results  (Last 48 hours)      None              Medical Decision Making   I am the first provider for this patient. I reviewed the vital signs, available nursing notes, past medical history, past surgical history, family history and social history. Vital Signs-Reviewed the patient's vital signs.   Patient Vitals for the past 12 hrs:   Temp Pulse Resp BP SpO2   03/08/23 1252 -- 76 -- -- 96 %   03/08/23 1241 97.7 °F (36.5 °C) -- 16 -- --   03/08/23 1236 -- -- 16 (!) 132/119 --           Records Reviewed: Nursing notes reviewed    Provider Notes (Medical Decision Making):   DDX: 49-year-old female with history of CKD and chronic lower extremity edema with bilateral stasis ulcers presents after a traumatic fall with generalized weakness. We will check basic lab work including CBC, BMP and urinalysis and obtain plain films of left knee. Patient appears globally weak. No focal weakness. No significant injury from fall. ED Course:   Initial assessment performed. The patients presenting problems have been discussed, and they are in agreement with the care plan formulated and outlined with them. I have encouraged them to ask questions as they arise throughout their visit. ED Course as of 03/08/23 [address]   Wed Mar 08, 2023   5416 I spoke with Dr. Kendrick Obando, hospitalist at [facility] via [facility] transfer center. I reviewed the patient's history, presentation and lab findings. He excepted the patient as transfer to [facility]. He requested a repeat BMP prior to transfer. [CH]      ED Course User Index  [CH] Elinor Tran MD         PROGRESS NOTE    Pt reevaluated. Patient with acute on chronic renal failure with BUN of 116 and creatinine of 3.84. Patient's baseline creatinine appears to be 1.6-1.7. She has been seen recently at wound care center and has had Lasix added for diuresis for edema to improve wound healing in her bilateral lower leg stasis ulcers. Potassium also elevated at 5.9. Started Kayexalate, bicarb, insulin and D50. [address] transfer center for possible transfer to [facility] for AGATA and hyperkalemia.   Written by Terrie Barcenas MD     CRITICAL CARE NOTE :    1:50 PM      IMPENDING DETERIORATION -Cardiovascular, CNS, Metabolic, and Renal    ASSOCIATED RISK FACTORS - Hypotension, Shock, Dysrhythmia, Metabolic changes, Dehydration, Vascular Compromise, and CNS Decompensation    MANAGEMENT- Bedside Assessment, Supervision of Care, and Transfer    INTERPRETATION -  Xrays, ECG, and Blood Pressure    INTERVENTIONS - hemodynamic mngmt and Metobolic interventions, emergent hyperkalemia treatment    CASE REVIEW - Hospitalist/Intensivist, Medical Sub-Specialist, Nursing, Family, and PCP    TREATMENT RESPONSE -Improved    PERFORMED BY - Self        NOTES   :      I have spent 40  minutes of critical care time involved in lab review, consultations with specialist, family decision- making, bedside attention and documentation. During this entire length of time I was immediately available to the patient . This does not include time spent on separately reported billable procedures. Critical Care: The reason for providing this level of medical care for this critically ill patient was due to a critical illness that impaired one or more vital organ systems, such that there was a high probability of imminent or life threatening deterioration in the patient's condition. This care involved high complexity decision making to assess, manipulate, and support vital system functions, to treat this degree of vital organ system failure, and to prevent further life threatening deterioration of the patients condition. Mark Victor MD     Written by Mark Victor MD        Critical Care Time:   40 minutes    Disposition:  Discharge    PLAN:  1. Current Discharge Medication List        2. Follow-up Information    None       Return to ED if worse     Diagnosis     Clinical Impression:   1. AGATA (acute kidney injury) ([facility] [address]    2. Acute hyperkalemia              Please note that this dictation was completed with [employer], the computer voice recognition software. Quite often unanticipated grammatical, syntax, homophones, and other interpretive errors are inadvertently transcribed by the computer software. Please disregard these errors. Please excuse any errors that have escaped final proofreading.

## 2023-03-08 NOTE — ED NOTES
Contacted [facility] transfer center to check on number for report.  New report number [phone]

## 2023-03-08 NOTE — ED NOTES
Patient and sister aware of transfer. Offered more fluids but patient ok at this time.  Watching TV at present

## 2023-03-08 NOTE — PROGRESS NOTES
1707-- Writer contacted [facility] and arranged ALS transport for this patient to [facility]. Requested information provided (Dx, wt, ht, CM, Normal Saline at 75 ml/hr, room air, no isolation precautions and insurance information given) ETA 2045-- CAIO Morales RN made aware. 1930-- Received a call from [facility] with an updated ETA of 2345-- ED staff made aware.

## 2023-03-08 NOTE — ED NOTES
TRANSFER - OUT REPORT:    Verbal report given to Javier Coleman RN(name) on Joslyn Javier  being transferred to ED [facility] room 2305(unit) for routine progression of care       Report consisted of patients Situation, Background, Assessment and   Recommendations(SBAR). Information from the following report(s) SBAR, Kardex and ED Summary was reviewed with the receiving nurse. Lines:   Peripheral IV 03/08/23 Left Arm (Active)   Site Assessment Clean, dry, & intact 03/08/23 1410   Phlebitis Assessment 0 03/08/23 1410   Infiltration Assessment 0 03/08/23 1410   Dressing Status Clean, dry, & intact 03/08/23 1410   Dressing Type Transparent 03/08/23 1410   Hub Color/Line Status Pink 03/08/23 1410   Alcohol Cap Used No 03/08/23 1410        Opportunity for questions and clarification was provided.       Patient transported with:   Monitor

## 2023-03-08 NOTE — ED NOTES
Updated patient on [facility] arrival time and room. Rosi Brunson with patient to notify sister if she does not return.

## 2023-03-08 NOTE — ED NOTES
Patient a lot more fatigued looking and is spitting up mucus states she is nauseated. Family at bedside.

## 2023-03-09 ENCOUNTER — HOSPITAL ENCOUNTER (INPATIENT)
Age: 88
LOS: 6 days | Discharge: NURSING, REHAB OR LONG-TERM CARE | DRG: 682 | End: 2023-03-15
Attending: INTERNAL MEDICINE | Admitting: STUDENT IN AN ORGANIZED HEALTH CARE EDUCATION/TRAINING PROGRAM
Payer: MEDICARE

## 2023-03-09 ENCOUNTER — APPOINTMENT (OUTPATIENT)
Dept: ULTRASOUND IMAGING | Age: 88
DRG: 682 | End: 2023-03-09
Attending: STUDENT IN AN ORGANIZED HEALTH CARE EDUCATION/TRAINING PROGRAM
Payer: MEDICARE

## 2023-03-09 VITALS
BODY MASS INDEX: 25.54 KG/M2 | TEMPERATURE: 97.9 F | SYSTOLIC BLOOD PRESSURE: 107 MMHG | RESPIRATION RATE: 16 BRPM | OXYGEN SATURATION: 100 % | HEART RATE: 80 BPM | HEIGHT: 64 IN | DIASTOLIC BLOOD PRESSURE: 39 MMHG | WEIGHT: 149.6 LBS

## 2023-03-09 DIAGNOSIS — L97.922 NON-PRESSURE CHRONIC ULCER OF LEFT LOWER LEG WITH FAT LAYER EXPOSED (HCC): Primary | ICD-10-CM

## 2023-03-09 PROBLEM — N17.9 AKI (ACUTE KIDNEY INJURY) (HCC): Status: ACTIVE | Noted: 2023-03-09

## 2023-03-09 LAB
ALBUMIN SERPL-MCNC: 2.2 G/DL (ref 3.5–5)
ALBUMIN/GLOB SERPL: 0.4 (ref 1.1–2.2)
ALP SERPL-CCNC: 112 U/L (ref 45–117)
ALT SERPL-CCNC: 26 U/L (ref 12–78)
ANION GAP SERPL CALC-SCNC: 4 MMOL/L (ref 5–15)
AST SERPL-CCNC: 20 U/L (ref 15–37)
BILIRUB SERPL-MCNC: 1.1 MG/DL (ref 0.2–1)
BUN SERPL-MCNC: 101 MG/DL (ref 6–20)
BUN/CREAT SERPL: 34 (ref 12–20)
CALCIUM SERPL-MCNC: 8.8 MG/DL (ref 8.5–10.1)
CHLORIDE SERPL-SCNC: 118 MMOL/L (ref 97–108)
CO2 SERPL-SCNC: 22 MMOL/L (ref 21–32)
CREAT SERPL-MCNC: 2.99 MG/DL (ref 0.55–1.02)
ERYTHROCYTE [DISTWIDTH] IN BLOOD BY AUTOMATED COUNT: 20.2 % (ref 11.5–14.5)
GLOBULIN SER CALC-MCNC: 5 G/DL (ref 2–4)
GLUCOSE SERPL-MCNC: 110 MG/DL (ref 65–100)
HCT VFR BLD AUTO: 31.4 % (ref 35–47)
HGB BLD-MCNC: 10.1 G/DL (ref 11.5–16)
MCH RBC QN AUTO: 30.3 PG (ref 26–34)
MCHC RBC AUTO-ENTMCNC: 32.2 G/DL (ref 30–36.5)
MCV RBC AUTO: 94.3 FL (ref 80–99)
NRBC # BLD: 0 K/UL (ref 0–0.01)
NRBC BLD-RTO: 0 PER 100 WBC
PLATELET # BLD AUTO: 307 K/UL (ref 150–400)
PMV BLD AUTO: 12.2 FL (ref 8.9–12.9)
POTASSIUM SERPL-SCNC: 4.4 MMOL/L (ref 3.5–5.1)
PROT SERPL-MCNC: 7.2 G/DL (ref 6.4–8.2)
RBC # BLD AUTO: 3.33 M/UL (ref 3.8–5.2)
SODIUM SERPL-SCNC: 144 MMOL/L (ref 136–145)
WBC # BLD AUTO: 16.4 K/UL (ref 3.6–11)

## 2023-03-09 PROCEDURE — 97161 PT EVAL LOW COMPLEX 20 MIN: CPT

## 2023-03-09 PROCEDURE — P9047 ALBUMIN (HUMAN), 25%, 50ML: HCPCS | Performed by: STUDENT IN AN ORGANIZED HEALTH CARE EDUCATION/TRAINING PROGRAM

## 2023-03-09 PROCEDURE — 74011250636 HC RX REV CODE- 250/636: Performed by: STUDENT IN AN ORGANIZED HEALTH CARE EDUCATION/TRAINING PROGRAM

## 2023-03-09 PROCEDURE — 80053 COMPREHEN METABOLIC PANEL: CPT

## 2023-03-09 PROCEDURE — 76770 US EXAM ABDO BACK WALL COMP: CPT

## 2023-03-09 PROCEDURE — 36415 COLL VENOUS BLD VENIPUNCTURE: CPT

## 2023-03-09 PROCEDURE — 83521 IG LIGHT CHAINS FREE EACH: CPT

## 2023-03-09 PROCEDURE — 97535 SELF CARE MNGMENT TRAINING: CPT

## 2023-03-09 PROCEDURE — 74011250637 HC RX REV CODE- 250/637: Performed by: STUDENT IN AN ORGANIZED HEALTH CARE EDUCATION/TRAINING PROGRAM

## 2023-03-09 PROCEDURE — 74011250637 HC RX REV CODE- 250/637: Performed by: INTERNAL MEDICINE

## 2023-03-09 PROCEDURE — 97530 THERAPEUTIC ACTIVITIES: CPT

## 2023-03-09 PROCEDURE — 65270000046 HC RM TELEMETRY

## 2023-03-09 PROCEDURE — 74011250636 HC RX REV CODE- 250/636: Performed by: INTERNAL MEDICINE

## 2023-03-09 PROCEDURE — 51798 US URINE CAPACITY MEASURE: CPT

## 2023-03-09 PROCEDURE — 85027 COMPLETE CBC AUTOMATED: CPT

## 2023-03-09 PROCEDURE — 97165 OT EVAL LOW COMPLEX 30 MIN: CPT

## 2023-03-09 PROCEDURE — 82784 ASSAY IGA/IGD/IGG/IGM EACH: CPT

## 2023-03-09 PROCEDURE — 97116 GAIT TRAINING THERAPY: CPT

## 2023-03-09 RX ORDER — ACETAMINOPHEN 325 MG/1
650 TABLET ORAL
Status: DISCONTINUED | OUTPATIENT
Start: 2023-03-09 | End: 2023-03-15 | Stop reason: HOSPADM

## 2023-03-09 RX ORDER — POLYETHYLENE GLYCOL 3350 17 G/17G
17 POWDER, FOR SOLUTION ORAL DAILY PRN
Status: DISCONTINUED | OUTPATIENT
Start: 2023-03-09 | End: 2023-03-15 | Stop reason: HOSPADM

## 2023-03-09 RX ORDER — MORPHINE SULFATE 2 MG/ML
2 INJECTION, SOLUTION INTRAMUSCULAR; INTRAVENOUS
Status: DISCONTINUED | OUTPATIENT
Start: 2023-03-09 | End: 2023-03-15

## 2023-03-09 RX ORDER — ALBUMIN HUMAN 250 G/1000ML
12.5 SOLUTION INTRAVENOUS ONCE
Status: COMPLETED | OUTPATIENT
Start: 2023-03-09 | End: 2023-03-09

## 2023-03-09 RX ORDER — ACETAMINOPHEN 650 MG/1
650 SUPPOSITORY RECTAL
Status: DISCONTINUED | OUTPATIENT
Start: 2023-03-09 | End: 2023-03-15 | Stop reason: HOSPADM

## 2023-03-09 RX ORDER — PROMETHAZINE HYDROCHLORIDE 25 MG/1
12.5 TABLET ORAL
Status: DISCONTINUED | OUTPATIENT
Start: 2023-03-09 | End: 2023-03-15 | Stop reason: HOSPADM

## 2023-03-09 RX ORDER — LISINOPRIL 10 MG/1
10 TABLET ORAL DAILY
Status: DISCONTINUED | OUTPATIENT
Start: 2023-03-10 | End: 2023-03-09

## 2023-03-09 RX ORDER — AMMONIUM LACTATE 12 G/100G
LOTION TOPICAL 2 TIMES DAILY
Status: DISCONTINUED | OUTPATIENT
Start: 2023-03-09 | End: 2023-03-15 | Stop reason: HOSPADM

## 2023-03-09 RX ORDER — FUROSEMIDE 40 MG/1
40 TABLET ORAL DAILY
Status: DISCONTINUED | OUTPATIENT
Start: 2023-03-09 | End: 2023-03-15 | Stop reason: HOSPADM

## 2023-03-09 RX ORDER — AMLODIPINE BESYLATE 5 MG/1
5 TABLET ORAL DAILY
Status: DISCONTINUED | OUTPATIENT
Start: 2023-03-10 | End: 2023-03-13

## 2023-03-09 RX ORDER — SODIUM CHLORIDE 9 MG/ML
100 INJECTION, SOLUTION INTRAVENOUS CONTINUOUS
Status: DISCONTINUED | OUTPATIENT
Start: 2023-03-09 | End: 2023-03-11

## 2023-03-09 RX ORDER — TRAMADOL HYDROCHLORIDE 50 MG/1
50 TABLET ORAL
Status: DISCONTINUED | OUTPATIENT
Start: 2023-03-09 | End: 2023-03-15 | Stop reason: HOSPADM

## 2023-03-09 RX ORDER — METOPROLOL TARTRATE 50 MG/1
50 TABLET ORAL 2 TIMES DAILY
Status: DISCONTINUED | OUTPATIENT
Start: 2023-03-09 | End: 2023-03-15 | Stop reason: HOSPADM

## 2023-03-09 RX ORDER — ONDANSETRON 2 MG/ML
4 INJECTION INTRAMUSCULAR; INTRAVENOUS
Status: DISCONTINUED | OUTPATIENT
Start: 2023-03-09 | End: 2023-03-15 | Stop reason: HOSPADM

## 2023-03-09 RX ORDER — SODIUM CHLORIDE, SODIUM LACTATE, POTASSIUM CHLORIDE, CALCIUM CHLORIDE 600; 310; 30; 20 MG/100ML; MG/100ML; MG/100ML; MG/100ML
75 INJECTION, SOLUTION INTRAVENOUS CONTINUOUS
Status: DISCONTINUED | OUTPATIENT
Start: 2023-03-09 | End: 2023-03-09

## 2023-03-09 RX ADMIN — MORPHINE SULFATE 2 MG: 2 INJECTION, SOLUTION INTRAMUSCULAR; INTRAVENOUS at 14:37

## 2023-03-09 RX ADMIN — METOPROLOL TARTRATE 50 MG: 50 TABLET ORAL at 18:33

## 2023-03-09 RX ADMIN — SODIUM CHLORIDE 100 ML/HR: 9 INJECTION, SOLUTION INTRAVENOUS at 09:55

## 2023-03-09 RX ADMIN — TRAMADOL HYDROCHLORIDE 50 MG: 50 TABLET ORAL at 09:55

## 2023-03-09 RX ADMIN — SODIUM CHLORIDE 100 ML/HR: 9 INJECTION, SOLUTION INTRAVENOUS at 23:44

## 2023-03-09 RX ADMIN — Medication: at 23:58

## 2023-03-09 RX ADMIN — METOPROLOL TARTRATE 50 MG: 50 TABLET ORAL at 09:55

## 2023-03-09 RX ADMIN — SODIUM CHLORIDE, POTASSIUM CHLORIDE, SODIUM LACTATE AND CALCIUM CHLORIDE 75 ML/HR: 600; 310; 30; 20 INJECTION, SOLUTION INTRAVENOUS at 05:12

## 2023-03-09 RX ADMIN — ALBUMIN (HUMAN) 12.5 G: 0.25 INJECTION, SOLUTION INTRAVENOUS at 05:33

## 2023-03-09 NOTE — PROGRESS NOTES
Occupational Therapy    Orders received, acknowledged, and patient chart reviewed up to date. Attempted visit, however patient moaning and crying, having difficulty answering any questions. Patient does indicate pain, however patient unable to specify further. Patient declines assistance with repositioning. Note patient also reports not knowing current location. Reoriented patient and updated RN. Patient unable to effectively participate in evaluation at this time. Will continue to follow.     Pham Guerra OTR/JOSE ANTONIO

## 2023-03-09 NOTE — PROGRESS NOTES
Problem: Falls - Risk of  Goal: *Absence of Falls  Description: Document Anjel Mirna Fall Risk and appropriate interventions in the flowsheet.   Outcome: Progressing Towards Goal  Note: Fall Risk Interventions:         Problem: Patient Education: Go to Patient Education Activity  Goal: Patient/Family Education  Outcome: Progressing Towards Goal

## 2023-03-09 NOTE — WOUND CARE
Wound Care consult for \"wounds on bilateral lower legs and sacrum / buttocks\" present on admission. Chart reviewed and patient assessed with the RN taking care of her today. Pt. Was in the chair but needed to get into the bed for this level of wound care. Pt. Is usually seen at the outpatient wound center and was due to be seen today but needed hospitalization. Assessment: Both of the leg wounds were raw, painful and red. Once the initial dressings were removed, the \"air hurt\" the legs and patient began to dry out the rest of the time we did the wound care. She was medicated for pain half way through it but it did not work well for her as she was very anxious. No photos were taken of the legs today due to this. The sacrum was photographed. The wounds on the right leg are circumferential. With red, granulation tissue throughout the wound bed and heavily macerated cate-wound skin. The toes had necrotic skin on them (wet eschar) that was removed from some of the right toes. In between the toes were very macerated. Media Information        Media Information        Media Information          The heels, feet and legs on both sides are in need of wound care. The heels need constant floating. The right buttocks has a skin tear on it with a completely blanchable wound bed and surrounding skin. The darker areas are just partially  callused and hyperpigmented skin that is intact. The gluteal cleft has a healing fissure with pink / red wound bed. Treatment: Treatment was reviewed from the outpatient wound center but the dressings chosen today were focused on better absorption of  the wound drainage. The legs were cleansed with Dynahex soap and water and the loose necrotic eschar was removed without problems. The leg wounds were then cleansed with Vashe Solution and wiped with gauze (this hurts the patient but it has to be done so medicate her in the future).    The skin surrounding the wounds we allowed to dry out some and then the zinc oxide cream was applied to the skin. The wounds were dressed with ioplex dressings and absorbent optilock dressings and high drainage wound ABD pads. Wrapped with kerlix and then an ACE wrap on each leg (not tight). Heels were placed in the heelmedix boots correctly and strapped. The sacrum and buttocks were cleansed, dried and then zinc oxide applied. Pt. Turned to the right. Plan: Pt. Will need an air pump on her Kb bed. Discussed with nursing. Turning schedule and continuously float the heels unless doing the wound care. Wound care orders written for this patient.    Na Hong, RN, BSN, [facility]

## 2023-03-09 NOTE — PROGRESS NOTES
Transition of Care Plan:    RUR: 17% \"moderate risk\"  Disposition: SNF-pending acceptance and insurance auth  If SNF or IPR: Date FOC offered: N/A  Date FOC received: N/A  Date authorization started with reference number: N/A  Date authorization received and expires: N/A  Accepting facility: N/A  Follow up appointments: PCP & Specialists as needed  DME needed: Has a Rollator at home  Transportation at Discharge: BLS transport  [address] or means to access home: Pt has access to home   IM Medicare Letter: 2nd IM letter needed at d/c  Is patient a  and connected with the [place]? N/A  Caregiver Contact: Pt's sister Collette Bourgeois [phone]  Discharge Caregiver contacted prior to discharge? To be contacted at d/c  Care Conference needed?:  Not at this time    Initial note: Chart reviewed for updates. PT/OT  recommendations acknowledged. CM deferred assessment to pt's sister as pt is oriented to name only. CM contacted pt's sister Collette Bourgeois [phone] introduced role, confirmed demographics were up-to-date and discussed d/c planning. Pt lives in a 1 level home with 5STE before you get to the front door. Pt's sister is her only support system. Pt was independent with ADL's at home and was using a Rollator at home. Pt has a history of inpatient rehab back at [place], [place]. Pt is currently open with [facility]. Pt uses  [facility]. CM discussed PT/OT recommendations with pt's sister. Pt sister is agreeable. Pt's sister is requesting for CM to send SNF options to her e-mail [email or web address]. CM has requested options by tomorrow. CM will continue to follow up.     Reason for Admission: Acute Kidney Injury                 RUR Score: 17% \"moderate risk\"                PCP: First and Last name:   Nancy Singer NP     Name of Practice: [address] [place] [address]    Are you a current patient: Yes/No: Yes   Approximate date of last visit: January 2023   Can you participate in a virtual visit if needed: In-person    Do you (patient/family) have any concerns for transition/discharge? None reported at this time                Plan for utilizing home health: Pt is independent was independent with ADL's at home and was using a Rollator at home. PT/OT is recommending SNF      Current Advanced Directive/Advance Care Plan:  Full Code-ACP on file    Healthcare Decision Maker:             Primary Decision Maker: Jillian Corey - [phone]    Care Management Interventions  PCP Verified by CM:  Yes  Palliative Care Criteria Met (RRAT>21 & CHF Dx)?: No  Mode of Transport at Discharge: BLS  Transition of Care Consult (CM Consult): Discharge Planning  Discharge Durable Medical Equipment: No  Health Maintenance Reviewed:  (Defer to SNF)  Physical Therapy Consult: Yes  Occupational Therapy Consult: Yes  Speech Therapy Consult: Yes  Support Systems: Other Family Member(s)  Confirm Follow Up Transport: Other (see comment) ([facility] to be scheduled at time of d/c)  The Plan for Transition of Care is Related to the Following Treatment Goals : Pt is likely transitioning to SNF  Discharge Location  Patient Expects to be Discharged to[de-identified] Skilled nursing facility (Pt is likely transitioning to SNF)     Jhoana Salas MSW    [phone]

## 2023-03-09 NOTE — ED NOTES
Called and updated Rolando Hui patient's sister on room number and contact number at ED [facility]

## 2023-03-09 NOTE — ED NOTES
Bedside shift change report given to Jonathan Zuleta 69 (oncoming nurse) by Delia Thorne RN (offgoing nurse). Report included the following information SBAR, Kardex, ED Summary and Intake/Output.

## 2023-03-09 NOTE — PROGRESS NOTES
Please note extensive images under media, for admission for this patient is under [facility] Emergency department. Both right and left heels, ankles, toes, feet, shins, and calves (entire area) has extensive injuries. Two spots to sacrum and left lower butt. Sacrum is red and parts non blachable. Tears to labia. Wound care cleaned wounds, dressed with vasaline olga, abd pads, kerlex, ace wrap. Escoration to groin and folds. Left toes are black. While taking pictures, I received consent from patient and showed her the pictures so she could see the extent of her wounds.

## 2023-03-09 NOTE — PROGRESS NOTES
Patient seen and examined today. Admitted by my partner early in a.m. Acute on chronic kidney disease stage III-IV  Bilateral lower extremity ulcers  Leukocytosis    Creatinine is trending down. Continue IV fluids. Repeat BMP in a.m. tomorrow  Wound care consulted for right lower extremity ulcers  UA is normal.  Chest x-ray shows no acute process. Check CBC in a.m.

## 2023-03-09 NOTE — PROGRESS NOTES
Physical Therapy    Order received and chart reviewed. Pt received in bed screaming, crying, grimacing, and reporting pain, but unable to state what is hurting. She is alert, but only oriented to self at this time. Unable to redirect pt or get her to participate enough to perform the PT evaluation. RN notified. Will defer and follow up with pt later today or tomorrow.

## 2023-03-09 NOTE — H&P
PLEASE NOTE: I HAVE GENERATED THIS NOTE WITH THE ASSISTANCE OF VOICE-RECOGNITION TECHNOLOGY. PLEASE EXCUSE ANY SPELLING, GRAMMATICAL, AND SYNTAX ERRORS YOU MAY FIND. IF YOU NEED CLARIFICATION ON ANYTHING, PLEASE REACH OUT TO ME.  [facility]  History and Physical - Dr. Blue Pepper:     80 y.o. female with pertinent medical hx of nothing specifically pertinent, but the patient does take Lasix at home and has CKD presented with fall to the ground, but not syncope. Differential diagnosis, explanation and analysis: [address] admission, it was noted that herCreatinine was more than a point above her baseline. To me, the patient seems very dry and she also has some wounds on her right foot.   Seems like it is all prerenal.    Acute problems:    # AGATA, likely prerenal, 2/2 dehydration  - Do NOT hold home ACE inhibitor;    Please see harriet Stallings in [facility] Dec 2018; and Hanna et al in [facility]; newer studies show that m/m are actually HIGHER in holding ACE-I/ARB in AGATA    - Bladder check; straight cath if residual  - Avoid Nephrotoxic Medications  - Fluid resuscitation  - Urine NA, Urine CR  - Strict I/o  -Hold home diuresis  -Does not need albumin  -Since the patient was transferred here for her AGATA is to be taken care of at that facility, will put in a nephro consult  -We will order renal US    # Foot wounds  - Wound consult    # Acute hyperkalemia, likely 2/2 AGATA  - Insulin, D50, calcium gluconate given in ED  - We will recheck potassium right now      PRN Orders: + zofran, + ACEP, + bowel regimen    Chronic Problems:    Continue home medications as per orders    General Admission Parameters:    GI Prophylaxis: Not indicated  Gathering data from Julio, et. al; Ren et al; and [facility]AS, et al - Please note that routine use of GI PPX in all patients is inappropriate    DVT Prophylaxis: SCD  I have used a combination of the Full Caprini Score and the [facility] score, as well as my own clinical judgement regarding overnight admissions, to determine if patient needs DVT PPX. Please note that routine use of DVT PPX in all patients is inappropriate. Please also note the following study: [email or web address]    Code status: Could not obtain from the patient, please reconcile this in the morning; patient has ACP docs that say she does not want life-prolonging measures, but this is not completely clear. If code status was discussed with the patient, then code status entered as per the orders                                                                              Subjective:   Primary Care Provider: Monico Willis NP  Chief Complaint: Fall    89 y.o. female presents with few minutes duration of abrupt onset, unchanging, now resolved, severe, fall does not associate with any other symptoms, remitted by nothing, exacerbated by nothing. Further history: Patient did not really have a syncopal event, she is felt her knees buckling and slowly slid to the floor, did not have any severe injury. Pertinent chart review: Nothing else pertinent. Case discussed with ED specialist; Documentation from the ED reviewed, as well as outpatient charts reviewed. Review of Systems:  12 point ROS obtained and otherwise negative, except as per HPI and above. Past Medical History:   Diagnosis Date    Chronic kidney disease     Hypercholesterolemia     Hypertension       Past Surgical History:   Procedure Laterality Date    HX CATARACT REMOVAL Right 03/27/2018    HX CATARACT REMOVAL  05/2018    left    HX CYST REMOVAL  1976    ear,     Prior to Admission medications    Medication Sig Start Date End Date Taking? Authorizing Provider   furosemide (LASIX) 40 mg tablet Take 1 Tablet by mouth daily.  Indications: visible water retention 2/16/23   Monico Willis NP   sodium hypochlorite (Dakin's Solution) 0.125 % soln external solution Apply 1 Each to affected area daily.    Provider, Historical   metoprolol tartrate (LOPRESSOR) 50 mg tablet Take 1 Tablet by mouth two (2) times a day. 1/13/23   Dina Mohs, NP   amLODIPine-benazepril (LOTREL) 5-10 mg per capsule Take 1 Capsule by mouth daily. 1/13/23   Dina Mohs, NP   hydroCHLOROthiazide (HYDRODIURIL) 25 mg tablet Take 1 Tablet by mouth daily. Patient not taking: No sig reported 1/13/23   Dina Mohs, NP   ammonium lactate (AMLACTIN EX) by Apply Externally route. Provider, Historical   polyethylene glycol (MIRALAX) 17 gram/dose powder  8/10/22   Provider, Historical   multivitamin (ONE A DAY) tablet Take 1 Tablet by mouth daily. Provider, Historical   acetaminophen (TYLENOL) 500 mg tablet Take 1 Tablet by mouth every six (6) hours as needed for Pain. 6/17/22   Dina Mohs, NP     No Known Allergies   Family History   Problem Relation Age of Onset    Hypertension Mother    .   Social History     Socioeconomic History    Marital status:    Tobacco Use    Smoking status: Never    Smokeless tobacco: Never   Vaping Use    Vaping Use: Never used   Substance and Sexual Activity    Alcohol use: No    Drug use: Never   Other Topics Concern     Service No    Blood Transfusions No    Caffeine Concern No    Occupational Exposure No    Hobby Hazards No    Sleep Concern No    Stress Concern No    Weight Concern No    Special Diet No    Back Care No    Exercise Yes    Bike Helmet No    Seat Belt Yes    Self-Exams Yes     Social Determinants of Health     Financial Resource Strain: Low Risk     Difficulty of Paying Living Expenses: Not hard at all   Food Insecurity: No Food Insecurity    Worried About Running Out of Food in the Last Year: Never true    Ran Out of Food in the Last Year: Never true   Transportation Needs: No Transportation Needs    Lack of Transportation (Medical): No    Lack of Transportation (Non-Medical): No   .  Objective:   Physical Exam:     VS as below    Const'l:          Normal body habitus, a&o, no acute distress  Head/Neck:       no cervical/head mass  Eyes:     nonicteric sclera, eom intact  ENT:      auditory acuity grossly intact either with or without device, no nasal deformity  Cardio:           Regular rate regular rhythm  Pulm:     no accessory muscle use  Abd:       S NT ND  Derm:     no rashes, no ulcers, no lesions  Extr:      No edema, no cyanosis, no calf tenderness, no varicosities  Neuro:    cn II-XII intact  Psych:   mood intact, judgement intact    Data Review: All diagnostic labs and studies have been reviewed.

## 2023-03-09 NOTE — PROGRESS NOTES
Problem: Mobility Impaired (Adult and Pediatric)  Goal: *Acute Goals and Plan of Care (Insert Text)  Description: FUNCTIONAL STATUS PRIOR TO ADMISSION: Pt not the best historian, but reports that she uses her rollator and cane for ambulation. HOME SUPPORT PRIOR TO ADMISSION: The patient lived alone with youngest sister to provide assistance. Physical Therapy Goals  Initiated 3/9/2023  1. Patient will move from supine to sit and sit to supine , scoot up and down, and roll side to side in bed with minimal assistance/contact guard assist within 7 day(s). 2.  Patient will transfer from bed to chair and chair to bed with minimal assistance/contact guard assist using the least restrictive device within 7 day(s). 3.  Patient will perform sit to stand with minimal assistance/contact guard assist within 7 day(s). 4.  Patient will ambulate with contact guard assist for 50 feet with the least restrictive device within 7 day(s). Outcome: Not Met   PHYSICAL THERAPY EVALUATION  Patient: Vikki Anderson (50 y.o. female)  Date: 3/9/2023  Primary Diagnosis: AGATA (acute kidney injury) ([facility] [address] [N17.9]       Precautions: Fall       ASSESSMENT  Based on the objective data described below, the patient presents with generalized weakness, BLE pain, impaired mobility, impaired balance, impaired gait, decreased activity tolerance, and overall decreased functional independence. Pt received in bed, agreeable to PT session with coaxing. She appears confused and foggy and giving conflicting information regarding her PLOF and home environment/set up. She does know she is in the hospital and states that she blacked out and fell at home(chart states she fell on her stairs due to her LLE giving away). She required a considerable amount of physical assistance to move from supine to sitting edge of bed. Once seated eob, she demonstrated good to fair sitting balance.  Her sister arrived during this session and states that she is unable to return home alone, as she was not taking good care of herself, with a lot of issues that pt was covering up coming to her attention. .  Pt able to stand and ambulate a very short distance to the recliner, where she was left with BLE elevated. Pt will need further rehab at discharge. Current Level of Function Impacting Discharge (mobility/balance):   Bed Mobility:  Supine to Sit: Moderate assistance;Assist x2; Additional time  Scooting: Maximum assistance;Assist x2; Additional time  Transfers:  Sit to Stand: Moderate assistance  Stand to Sit: Minimum assistance  Bed to Chair: Contact guard assistance;Minimum assistance  Ambulation/Gait Training:  Distance (ft): 3 Feet (ft)  Assistive Device: Gait belt;Walker, rolling  Ambulation - Level of Assistance: Contact guard assistance;Minimal assistance    Functional Outcome Measure: The patient scored 11/24 on the [facility] outcome measure. Other factors to consider for discharge: lives alone, below her baseline, difficulty taking care of herself at baseline     Patient will benefit from skilled therapy intervention to address the above noted impairments. PLAN :  Recommendations and Planned Interventions: bed mobility training, transfer training, gait training, therapeutic exercises, patient and family training/education, and therapeutic activities      Frequency/Duration: Patient will be followed by physical therapy:  3 times a week to address goals. Recommendation for discharge: (in order for the patient to meet his/her long term goals)  Therapy up to 5 days/week in SNF setting    This discharge recommendation:  A follow-up discussion with the attending provider and/or case management is planned    IF patient discharges home will need the following DME: to be determined (TBD)         SUBJECTIVE:   Patient stated I use my roller walker.     OBJECTIVE DATA SUMMARY:   HISTORY:    Past Medical History:   Diagnosis Date    Chronic kidney disease Hypercholesterolemia     Hypertension      Past Surgical History:   Procedure Laterality Date    HX CATARACT REMOVAL Right 03/27/2018    HX CATARACT REMOVAL  05/2018    left    HX CYST REMOVAL  1976    ear,       Personal factors and/or comorbidities impacting plan of care: cognition, insight into deficits, decreased safety awareness    Home Situation  Home Environment: Private residence  # Steps to Enter: 4  Rails to Enter: Yes  Hand Rails : Bilateral (wide)  One/Two Story Residence: One story  Living Alone: Yes  Support Systems: Other Family Member(s)  Patient Expects to be Discharged to[de-identified] Skilled nursing facility  Current DME Used/Available at Home: [address], rollator, [address] [facility] [place], straight    EXAMINATION/PRESENTATION/DECISION MAKING:   Critical Behavior:  Neurologic State: Drowsy, Confused  Orientation Level: Oriented to person, Oriented to place, Disoriented to time, Disoriented to situation  Cognition: Decreased attention/concentration, Poor safety awareness     Range Of Motion:  AROM: Generally decreased, functional         Strength:    Strength: Generally decreased, functional         Tone & Sensation:   Tone: Normal          Coordination:  Coordination: Generally decreased, functional     Functional Mobility:  Bed Mobility:     Supine to Sit: Moderate assistance;Assist x2; Additional time     Scooting: Maximum assistance;Assist x2; Additional time  Transfers:  Sit to Stand: Moderate assistance  Stand to Sit: Minimum assistance        Bed to Chair: Contact guard assistance;Minimum assistance              Balance:   Sitting: Impaired  Sitting - Static: Fair (occasional); Good (unsupported)  Sitting - Dynamic: Not tested  Standing: Impaired  Standing - Static: Fair;Constant support  Standing - Dynamic : Fair;Constant support  Ambulation/Gait Training:  Distance (ft): 3 Feet (ft)  Assistive Device: Gait belt;Walker, rolling  Ambulation - Level of Assistance: Contact guard assistance;Minimal assistance     Gait Description (WDL): Exceptions to WDL  Gait Abnormalities: Decreased step clearance        Base of Support: Narrowed     Speed/Vickie: Slow  Step Length: Right shortened;Left shortened        Functional Measure:  [facility] AM-PAC®      Basic Mobility Inpatient Short Form (6-Clicks) Version 2  How much HELP from another person do you currently need. .. (If the patient hasn't done an activity recently, how much help from another person do you think they would need if they tried?) Total A Lot A Little None   1. Turning from your back to your side while in a flat bed without using bedrails? [x]  1 []  2 []  3  []  4   2. Moving from lying on your back to sitting on the side of a flat bed without using bedrails? [x]  1 []  2 []  3  []  4   3. Moving to and from a bed to a chair (including a wheelchair)? []  1 [x]  2 []  3  []  4   4. Standing up from a chair using your arms (e.g. wheelchair or bedside chair)? []  1 [x]  2 []  3  []  4   5. Walking in hospital room? []  1 []  2 [x]  3  []  4   6. Climbing 3-5 steps with a railing? []  1 [x]  2 []  3  []  4     Raw Score: 11/24                            Cutoff score ?171,2,3 had higher odds of discharging home with home health or need of SNF/IPR. [address] [facility], Jalen Chahal Welch Metmejia Galvan. Validity of the AM-PAC 6-Clicks Inpatient Daily Activity and Basic Mobility Short Forms. Physical Therapy Mar 2014, [phone]) [phone]; DOI: 10.2522/ptj.83277161  2. Kulwinder Jacobson. Association of AM-PAC \"6-Clicks\" Basic Mobility and Daily Activity Scores With Discharge Destination. Phys Ther. 2021 Apr 4;101(4):dfio578. doi: 10.1093/ptj/ntvl227. PMID: 51647642. V Deisi Guerrero, Trey D, Chelo Barbour, Tamanna K, Jered S.  Activity Measure for Post-Acute Care \"6-Clicks\" Basic Mobility Scores Predict Discharge Destination After Acute Care Hospitalization in Select Patient Groups: A Retrospective, Observational Study. Arch Rehabil Res Clin Transl. 2022 Jul 16;4(3):901987. doi: 10.1016/j.arrct. 3414.392434. PMID: 12029842; PMCID: [id number]. 4. Debra Wyatt Ni P. AM-PAC Short Forms Manual 4.0. Revised 2/2020. Pain Rating:  BLE, not quantified    Activity Tolerance:   Fair, SpO2 stable on RA, and requires rest breaks    After treatment patient left in no apparent distress:   Sitting in chair, Call bell within reach, Bed / chair alarm activated, and Caregiver / family present    COMMUNICATION/EDUCATION:   The patients plan of care was discussed with: Occupational therapist.     Fall prevention education was provided and the patient/caregiver indicated understanding., Patient/family have participated as able in goal setting and plan of care. , and Patient/family agree to work toward stated goals and plan of care.     Thank you for this referral.  Alfredo Rubio, PT   Time Calculation: 26 mins

## 2023-03-09 NOTE — CONSULTS
Consultation Note    NAME: Andrew Wellington   :  1933   MRN:  [id number]     Date/Time:  3/9/2023 10:39 AM         Assessment :    Plan:  CKD-4  AGATA  Edema  HTN  Anemia   Pt was supposed to see Dr. Elmira Martin as an outpatient for CKD-4. Creatinine 2.0 in 2022. Urine sediment bland. Us ordered. I will check limited serologies. Bladder scan 161 cc per RN. AGATA may be volume related. Creatinine better overnight with IVF. Continue IVf. Hold lasix. Watch volume status. Subjective:   CHIEF COMPLAINT:  \"My stomach hurts. \"      HISTORY OF PRESENT ILLNESS:     Mar Portillo is a 80 y.o.   female who has a history of CKD-4 admitted with AGATA. She is a limited historian. Review of chart shows that her creatinine was 2 in 2022. She saw her PCP recently and they referred her to see Dr. Elmira Martin. She presented to an outside ER S/P a fall. Her left knee gave way while she was standing and she fell to the ground. She was subsequently transferred here for evaluation of AGATA. Past Medical History:   Diagnosis Date    Chronic kidney disease     Hypercholesterolemia     Hypertension       Past Surgical History:   Procedure Laterality Date    HX CATARACT REMOVAL Right 2018    HX CATARACT REMOVAL  2018    left    HX CYST REMOVAL      ear,     Social History     Tobacco Use    Smoking status: Never    Smokeless tobacco: Never   Substance Use Topics    Alcohol use: No      Family History   Problem Relation Age of Onset    Hypertension Mother       No Known Allergies   Prior to Admission medications    Medication Sig Start Date End Date Taking? Authorizing Provider   furosemide (LASIX) 40 mg tablet Take 1 Tablet by mouth daily. Indications: visible water retention 23   Jeremiah Howe NP   sodium hypochlorite (Dakin's Solution) 0.125 % soln external solution Apply 1 Each to affected area daily.     Provider, Historical   metoprolol tartrate (LOPRESSOR) 50 mg tablet Take 1 Tablet by mouth two (2) times a day. 1/13/23   Carlos Zacarias NP   amLODIPine-benazepril (LOTREL) 5-10 mg per capsule Take 1 Capsule by mouth daily. 1/13/23   Carlos Zacarias NP   hydroCHLOROthiazide (HYDRODIURIL) 25 mg tablet Take 1 Tablet by mouth daily. Patient not taking: No sig reported 1/13/23   Carlos Zacarias NP   ammonium lactate (AMLACTIN EX) by Apply Externally route. Provider, Historical   polyethylene glycol (MIRALAX) 17 gram/dose powder  8/10/22   Provider, Historical   multivitamin (ONE A DAY) tablet Take 1 Tablet by mouth daily. Provider, Historical   acetaminophen (TYLENOL) 500 mg tablet Take 1 Tablet by mouth every six (6) hours as needed for Pain.  6/17/22   Carlos Zacarias NP     REVIEW OF SYSTEMS:     [x]  Unable to obtain reliable ROS due to  [x] mental status  [] sedated   [] intubated   [] Total of 12 systems reviewed as follows:  Constitutional: negative fever, negative chills, negative weight loss  Eyes:   negative visual changes  ENT:   negative sore throat, tongue or lip swelling  Respiratory:  negative cough, negative dyspnea  Cards:  negative for chest pain, palpitations, lower extremity edema  GI:   negative for nausea, vomiting, diarrhea, and abdominal pain  :  negative for frequency, dysuria  Integument:  negative for rash and pruritus  Heme:  negative for easy bruising and gum/nose bleeding  Musculoskel: negative for myalgias,  back pain and muscle weakness  Neuro:  negative for headaches, dizziness, vertigo  Psych:  negative for feelings of anxiety, depression   Travel?: none    Objective:   VITALS:    Visit Vitals  BP (!) 114/44 (BP 1 Location: Left upper arm, BP Patient Position: At rest)   Pulse 90   Temp 98 °F (36.7 °C)   Resp 18   SpO2 100%     PHYSICAL EXAM:  Gen:  []  WD []  WN  [] cachectic []  thin []  obese []  disheveled             []  ill apearing  []   Critical  [x]   Chronic    [x]  No acute distress    HEENT:   [x] NC/AT/PERRL    [] pink conjunctivae      [] pale conjunctivae                  PERRL  [] yes  [] no      [] moist mucosa    [] dry mucosa    hearing intact to voice [] yes  [] No                 NECK:   supple [] yes  [] no        masses [] yes  [] No               thyroid  []  non tender  []  tender    RESP:   [x] CTA bilaterally/no wheezing/rhonchi/rales/crackles    [] rhonchi bilaterally - no dullness  [] wheezing   [] rhonchi   [] crackles     use of accessory muscles [] yes [] no    CARD:   [x]  regular rate and rhythm/No murmurs/rubs/gallops    murmur  [] yes ()  [] no      Rubs  [] yes  [] no       Gallops [] yes  [] no    Rate []  regular  []  irregular        carotid bruits  [] Right  []  Left                 LE edema [x] yes  [] no           JVP  []  yes   []  no    ABD:    [x] soft/non distended/non tender/+bowel sounds/no HSM    []  Rigid    tenderness [] yes [] no   Liver enlargement  []  yes []  no                Spleen enlargement  []  yes []  no     distended []  yes [] no     bowel sound  [] hypoactive   [] hyperactive    LYMPH:    Neck []  yes []  no       Axillae []  yes []  no    SKIN:   Rashes []  yes   []  no    Ulcers []  yes   []  no               [] tight to palpitation    skin turgor []  good  [] poor  [] decreased               Cyanosis/clubbing []  yes []  no    NEUR:   [x] cranial nerves II-XII grossly intact       [] Cranial nerves deficit                 []  facial droop    []  slurred speech   [] aphasic     [] Strength normal     []  weakness  []  LUE  []   RUE/ []  LLE  []   RLE    follows commands  [x]  yes []  no           PSYCH:   insight [x] poor [] good   Alert and Oriented to  [] person  [] place  []  time                    [] depressed [] anxious [] agitated  [] lethargic [] stuporous  [] sedated     LAB DATA REVIEWED:    Recent Labs     03/09/23  0522 03/08/23  1248   WBC 16.4* 13.8*   HGB 10.1* 10.5*   HCT 31.4* 32.2*    273     Recent Labs     03/09/23  0627 03/08/23 2032 03/08/23  1248    144 140   K 4.4 5. 4* 5.9*   * 111* 108   CO2 22 22 19*   * 114* 116*   CREA 2.99* 3.57* 3.84*   * 125* 137*   CA 8.8 9.0 9.4     Recent Labs     03/09/23  0627   ALT 26      TBILI 1.1*   ALB 2.2*   GLOB 5.0*     No results for input(s): INR, PTP, APTT, INREXT in the last 72 hours. No results for input(s): FE, TIBC, PSAT, FERR in the last 72 hours. No results for input(s): PH, PCO2, PO2 in the last 72 hours. No results for input(s): CPK, CKMB in the last 72 hours.     No lab exists for component: TROPONINI  Lab Results   Component Value Date/Time    Glucose (POC) 90 03/08/2023 05:43 PM    Glucose (POC) 70 03/08/2023 05:15 PM    Glucose (POC) 61 (L) 03/08/2023 04:49 PM    Glucose (POC) 52 (LL) 03/08/2023 04:45 PM    Glucose  03/10/2014 01:40 AM       Procedures: see electronic medical records for all procedures/Xrays and details which were not copied into this note but were reviewed prior to creation of Plan.    ________________________________________________________________________       ___________________________________________________  Consulting Physician: Annamaria Shaver MD

## 2023-03-09 NOTE — PROGRESS NOTES
Problem: Self Care Deficits Care Plan (Adult)  Goal: *Acute Goals and Plan of Care (Insert Text)  Description: FUNCTIONAL STATUS PRIOR TO ADMISSION: Patient is a questionable historian. She endorses use of cane and rollator for functional mobility. Note patient admitted with extensive wounds suggesting difficulty caring for herself recently. HOME SUPPORT: Patient lives alone. Her sister provides assistance with groceries and transportation, checking in occasionally. Per sister, when being picked up for medical appointments, she is usually alert, oriented, and fully dressed upon sister's arrival to the house. Occupational Therapy Goals  Initiated 3/9/2023  1. Patient will perform seated grooming with standby assistance within 7 day(s). 2.  Patient will perform self-feeding with standby assistance within 7 day(s). 3.  Patient will perform lower body dressing with moderate assistance within 7 day(s). 4.  Patient will perform toilet transfers to [facility] with standby assistance using RW prn within 7 day(s). 5.  Patient will perform all aspects of toileting with moderate assistance within 7 day(s). Outcome: Progressing Towards Goal   OCCUPATIONAL THERAPY EVALUATION  Patient: Leopoldo Schlatter (89 y.o. female)  Date: 3/9/2023  Primary Diagnosis: AGATA (acute kidney injury) [facility]) [N17.9]       Precautions:  Fall    ASSESSMENT  Based on the objective data described below, the patient presents with impaired functional mobility and balance, decreased activity tolerance, extensive wounds, decline in functional independence, and impaired cognition (to include judgment, reasoning, memory, sequencing, etc) s/p admission for AGATA. Patient is received resting in bed, oriented to person but requiring reorientation to place, situation, and time. Patient requires heavy assistance for bed mobility, exhibiting difficulty with motor planning and ultimately requiring mod assist x2 to achieve.  Throughout session, patient maintains flat affect, often staring off with increased time needed for processing and response. Patient is unsafe to return to home at this time, requiring 24/7 hands-on supervision/assist for safety. Anticipate need for SNF rehab at discharge with patient potentially benefiting from transition to penitentiary in the future. Current Level of Function Impacting Discharge (ADLs/self-care): up to max A    Functional Outcome Measure: The patient scored 35/100 on the Barthel Index. Other factors to consider for discharge: AMS; lives alone     Patient will benefit from skilled therapy intervention to address the above noted impairments. PLAN :  Recommendations and Planned Interventions: self care training, functional mobility training, therapeutic exercise, balance training, therapeutic activities, cognitive retraining, endurance activities, patient education, home safety training, and family training/education    Frequency/Duration: Patient will be followed by occupational therapy 3 times a week to address goals. Recommendation for discharge: (in order for the patient to meet his/her long term goals)  Therapy up to 5 days/week in SNF setting    This discharge recommendation:  Has not yet been discussed the attending provider and/or case management    IF patient discharges home will need the following DME: TBD       SUBJECTIVE:   Patient stated Chris Rothman said I might need roller skates someday.     OBJECTIVE DATA SUMMARY:   HISTORY:   Past Medical History:   Diagnosis Date    Chronic kidney disease     Hypercholesterolemia     Hypertension      Past Surgical History:   Procedure Laterality Date    HX CATARACT REMOVAL Right 03/27/2018    HX CATARACT REMOVAL  05/2018    left    HX CYST REMOVAL  1976    ear,       Expanded or extensive additional review of patient history:     Home Situation  Home Environment: Private residence  # Steps to Enter: 4  Rails to Enter: Yes  Hand Rails : Bilateral (wide)  One/Two Story Residence: [facility] story  Living Alone: Yes  Support Systems: Other Family Member(s)  Patient Expects to be Discharged to[de-identified] Skilled nursing facility  Current DME Used/Available at Home: [employer] Cane, straight    Hand dominance:     EXAMINATION OF PERFORMANCE DEFICITS:  Cognitive/Behavioral Status:  Neurologic State: Alert;Confused  Orientation Level: Oriented to person;Disoriented to place; Disoriented to situation;Disoriented to time  Cognition: Follows commands;Decreased command following;Decreased attention/concentration;Memory loss  Perception: Verbal;Visual;Tactile  Perseveration: No perseveration noted  Safety/Judgement: Decreased insight into deficits; Decreased awareness of need for safety;Decreased awareness of need for assistance; Fall prevention    Skin: see wound care notes    Edema: see wound care notes    Hearing:       Vision/Perceptual:    Acuity: Within Defined Limits;Able to read employee name badge without difficulty       Range of Motion:  AROM: Generally decreased, functional    Strength:  Strength: Generally decreased, functional    Coordination:  Coordination: Generally decreased, functional    Tone & Sensation:  Tone: Normal    Balance:  Sitting: Impaired  Sitting - Static: Good (unsupported)  Sitting - Dynamic: Fair (occasional)  Standing: Impaired  Standing - Static: Fair;Constant support  Standing - Dynamic : Fair;Constant support    Functional Mobility and Transfers for ADLs:  Bed Mobility:  Supine to Sit: Moderate assistance;Assist x2; Additional time  Scooting: Maximum assistance;Assist x2; Additional time    Transfers:  Sit to Stand: Moderate assistance  Stand to Sit: Minimum assistance  Bed to Chair: Contact guard assistance;Minimum assistance  Toilet Transfer : Moderate assistance (infer to [facility])    ADL Assessment:  Feeding: Moderate assistance    Oral Facial Hygiene/Grooming: Moderate assistance    Bathing: Maximum assistance    Type of Bath: Bath Pack    Upper Body Dressing:  Moderate assistance    Lower Body Dressing: Maximum assistance    Toileting: Maximum assistance    ADL Intervention and task modifications:  Upper Body Dressing Assistance  Dressing Assistance: Moderate assistance  Hospital Gown: Moderate assistance    Lower Body Dressing Assistance  Dressing Assistance: Total assistance(dependent)  Socks: Total assistance (dependent)    Cognitive Retraining  Orientation Retraining: Reorienting;Situation;Time;Place; Awareness of environment  Problem Solving: Identifying the problem; Identifying the task; Awareness of environment  Organizing/Sequencing: Breaking task down  Attention to Task: Distractibility; Single task  Safety/Judgement: Decreased insight into deficits; Decreased awareness of need for safety;Decreased awareness of need for assistance; Fall prevention    Functional Measure:    Barthel Index:  Bathin  Bladder: 5  Bowels: 10  Groomin  Dressin  Feedin  Mobility: 0  Stairs: 0  Toilet Use: 5  Transfer (Bed to Chair and Back): 10  Total: 35/100      The Barthel ADL Index: Guidelines  1. The index should be used as a record of what a patient does, not as a record of what a patient could do. 2. The main aim is to establish degree of independence from any help, physical or verbal, however minor and for whatever reason. 3. The need for supervision renders the patient not independent. 4. A patient's performance should be established using the best available evidence. Asking the patient, friends/relatives and nurses are the usual sources, but direct observation and common sense are also important. However direct testing is not needed. 5. Usually the patient's performance over the preceding 24-48 hours is important, but occasionally longer periods will be relevant. 6. Middle categories imply that the patient supplies over 50 per cent of the effort. 7. Use of aids to be independent is allowed.     Score Interpretation (from [address] [place] [address])    Independent   60-79 Minimally independent   40-59 Partially dependent   20-39 Very dependent   <20 Totally dependent     -Liz Schultz, Barthel, D.W. (6093). Functional evaluation: the Barthel Index. [address] ([address] (1997). The Barthel activities of daily living index: self-reporting versus actual performance in the old (> or = 75 years). Journal of [address] [place] [address] 45(7), [address] [facility], CLIVE, Mackenzie Kong., Nedra Alfonso. (1999). Measuring the change in disability after inpatient rehabilitation; comparison of the responsiveness of the Barthel Index and Functional Rufus Measure. Journal of Neurology, Neurosurgery, and Psychiatry, 66(4), 414-533. ISAIAH Cartwright Asp, ALLIE Fam, & Rodrigo You MVIVI. (2004) Assessment of post-stroke quality of life in cost-effectiveness studies: The usefulness of the Barthel Index and the EuroQoL-5D. Quality of Life Research, 15, 495-34         Occupational Therapy Evaluation Charge Determination   History Examination Decision-Making   LOW Complexity : Brief history review  LOW Complexity : 1-3 performance deficits relating to physical, cognitive , or psychosocial skils that result in activity limitations and / or participation restrictions  LOW Complexity : No comorbidities that affect functional and no verbal or physical assistance needed to complete eval tasks       Based on the above components, the patient evaluation is determined to be of the following complexity level: LOW   Pain Rating:  No reports. Activity Tolerance:   Fair    After treatment patient left in no apparent distress:    Sitting in chair, Call bell within reach, Bed / chair alarm activated, Caregiver / family present, and BLEs elevated    COMMUNICATION/EDUCATION:   The patients plan of care was discussed with: Physical therapist and Registered nurse.      Home safety education was provided and the patient/caregiver indicated understanding., Patient/family have participated as able in goal setting and plan of care. , and Patient/family agree to work toward stated goals and plan of care.     Thank you for this referral.  Fatoumata Patel, OT  Time Calculation: 33 mins

## 2023-03-09 NOTE — PROGRESS NOTES
End of Shift Note    Bedside shift change report given to Joanna Schofield RN (oncoming nurse) by Tarun Cox RN (offgoing nurse). Report included the following information SBAR, Kardex, Intake/Output, MAR, Med Rec Status, and Cardiac Rhythm NSR    Shift worked:  1900-0730     Shift summary and any significant changes:     Pt brought to [facility] from [facility] by [facility] at around [phone], Pt tearful but denies pain. Multiply wound on bilateral lower extremities, excoriations under breast and groins, new dressing applied to wounds. Admitting hospitalist came to bed side. CHG bath and wound care completed. Wound consult ordered. See flowsheet and MAR for more information.       Concerns for physician to address:       Zone phone for oncoming shift:          Activity:  Activity Level: Bed Rest  Number times ambulated in hallways past shift: 0  Number of times OOB to chair past shift: 0    Cardiac:   Cardiac Monitoring: Yes      Cardiac Rhythm: Sinus Rhythm    Access:  Current line(s): PIV     Genitourinary:   Urinary status: voiding    Respiratory:   O2 Device: None (Room air)  Chronic home O2 use?: NO  Incentive spirometer at bedside: YES       GI:  Last Bowel Movement Date: 03/09/23  Current diet:  No diet orders on file  Passing flatus: YES  Tolerating current diet: YES       Pain Management:   Patient states pain is manageable on current regimen: YES    Skin:  Kvng Score: 10  Interventions: increase time out of bed and PT/OT consult    Patient Safety:  Fall Score:    Interventions: bed/chair alarm, assistive device (walker, cane, etc), gripper socks, and pt to call before getting OOB       Length of Stay:  Expected LOS: - - -  Actual LOS: 0      Tarun Cox RN

## 2023-03-10 ENCOUNTER — APPOINTMENT (OUTPATIENT)
Dept: CT IMAGING | Age: 88
DRG: 682 | End: 2023-03-10
Attending: NURSE PRACTITIONER
Payer: MEDICARE

## 2023-03-10 ENCOUNTER — APPOINTMENT (OUTPATIENT)
Dept: GENERAL RADIOLOGY | Age: 88
DRG: 682 | End: 2023-03-10
Attending: NURSE PRACTITIONER
Payer: MEDICARE

## 2023-03-10 LAB
ALBUMIN SERPL-MCNC: 2 G/DL (ref 3.5–5)
ALBUMIN/GLOB SERPL: 0.4 (ref 1.1–2.2)
ALP SERPL-CCNC: 96 U/L (ref 45–117)
ALT SERPL-CCNC: 21 U/L (ref 12–78)
ANION GAP SERPL CALC-SCNC: 4 MMOL/L (ref 5–15)
APPEARANCE UR: CLEAR
AST SERPL-CCNC: 17 U/L (ref 15–37)
BACTERIA URNS QL MICRO: NEGATIVE /HPF
BASOPHILS # BLD: 0 K/UL (ref 0–0.1)
BASOPHILS NFR BLD: 0 % (ref 0–1)
BILIRUB SERPL-MCNC: 1.2 MG/DL (ref 0.2–1)
BILIRUB UR QL: NEGATIVE
BUN SERPL-MCNC: 74 MG/DL (ref 6–20)
BUN/CREAT SERPL: 31 (ref 12–20)
CALCIUM SERPL-MCNC: 8.7 MG/DL (ref 8.5–10.1)
CHLORIDE SERPL-SCNC: 121 MMOL/L (ref 97–108)
CO2 SERPL-SCNC: 23 MMOL/L (ref 21–32)
COLOR UR: ABNORMAL
CREAT SERPL-MCNC: 2.36 MG/DL (ref 0.55–1.02)
DIFFERENTIAL METHOD BLD: ABNORMAL
EOSINOPHIL # BLD: 0.3 K/UL (ref 0–0.4)
EOSINOPHIL NFR BLD: 2 % (ref 0–7)
EPITH CASTS URNS QL MICRO: ABNORMAL /LPF
ERYTHROCYTE [DISTWIDTH] IN BLOOD BY AUTOMATED COUNT: 19.3 % (ref 11.5–14.5)
GLOBULIN SER CALC-MCNC: 4.6 G/DL (ref 2–4)
GLUCOSE BLD STRIP.AUTO-MCNC: 110 MG/DL (ref 65–117)
GLUCOSE BLD STRIP.AUTO-MCNC: 141 MG/DL (ref 65–117)
GLUCOSE BLD STRIP.AUTO-MCNC: 77 MG/DL (ref 65–117)
GLUCOSE BLD STRIP.AUTO-MCNC: 88 MG/DL (ref 65–117)
GLUCOSE SERPL-MCNC: 98 MG/DL (ref 65–100)
GLUCOSE UR STRIP.AUTO-MCNC: NEGATIVE MG/DL
HCT VFR BLD AUTO: 30.7 % (ref 35–47)
HGB BLD-MCNC: 9.7 G/DL (ref 11.5–16)
HGB UR QL STRIP: NEGATIVE
HYALINE CASTS URNS QL MICRO: ABNORMAL /LPF (ref 0–2)
IMM GRANULOCYTES # BLD AUTO: 0.1 K/UL (ref 0–0.04)
IMM GRANULOCYTES NFR BLD AUTO: 1 % (ref 0–0.5)
KAPPA LC FREE SER-MCNC: 127.3 MG/L (ref 3.3–19.4)
KAPPA LC FREE/LAMBDA FREE SER: 2.66 (ref 0.26–1.65)
KETONES UR QL STRIP.AUTO: NEGATIVE MG/DL
LAMBDA LC FREE SERPL-MCNC: 47.8 MG/L (ref 5.7–26.3)
LEUKOCYTE ESTERASE UR QL STRIP.AUTO: NEGATIVE
LYMPHOCYTES # BLD: 0.8 K/UL (ref 0.8–3.5)
LYMPHOCYTES NFR BLD: 6 % (ref 12–49)
MCH RBC QN AUTO: 30.3 PG (ref 26–34)
MCHC RBC AUTO-ENTMCNC: 31.6 G/DL (ref 30–36.5)
MCV RBC AUTO: 95.9 FL (ref 80–99)
MONOCYTES # BLD: 1.5 K/UL (ref 0–1)
MONOCYTES NFR BLD: 11 % (ref 5–13)
NEUTS SEG # BLD: 10.9 K/UL (ref 1.8–8)
NEUTS SEG NFR BLD: 80 % (ref 32–75)
NITRITE UR QL STRIP.AUTO: NEGATIVE
NRBC # BLD: 0 K/UL (ref 0–0.01)
NRBC BLD-RTO: 0 PER 100 WBC
PH UR STRIP: 5 (ref 5–8)
PLATELET # BLD AUTO: 242 K/UL (ref 150–400)
PMV BLD AUTO: 11.4 FL (ref 8.9–12.9)
POTASSIUM SERPL-SCNC: 4.4 MMOL/L (ref 3.5–5.1)
PROT SERPL-MCNC: 6.6 G/DL (ref 6.4–8.2)
PROT UR STRIP-MCNC: ABNORMAL MG/DL
RBC # BLD AUTO: 3.2 M/UL (ref 3.8–5.2)
RBC #/AREA URNS HPF: ABNORMAL /HPF (ref 0–5)
RBC MORPH BLD: ABNORMAL
SERVICE CMNT-IMP: ABNORMAL
SERVICE CMNT-IMP: NORMAL
SODIUM SERPL-SCNC: 148 MMOL/L (ref 136–145)
SP GR UR REFRACTOMETRY: 1.01
UA: UC IF INDICATED,UAUC: ABNORMAL
UROBILINOGEN UR QL STRIP.AUTO: 0.2 EU/DL (ref 0.2–1)
WBC # BLD AUTO: 13.6 K/UL (ref 3.6–11)
WBC URNS QL MICRO: ABNORMAL /HPF (ref 0–4)

## 2023-03-10 PROCEDURE — 87040 BLOOD CULTURE FOR BACTERIA: CPT

## 2023-03-10 PROCEDURE — 74011250636 HC RX REV CODE- 250/636: Performed by: INTERNAL MEDICINE

## 2023-03-10 PROCEDURE — 51798 US URINE CAPACITY MEASURE: CPT

## 2023-03-10 PROCEDURE — 74011250637 HC RX REV CODE- 250/637: Performed by: STUDENT IN AN ORGANIZED HEALTH CARE EDUCATION/TRAINING PROGRAM

## 2023-03-10 PROCEDURE — 81001 URINALYSIS AUTO W/SCOPE: CPT

## 2023-03-10 PROCEDURE — 97535 SELF CARE MNGMENT TRAINING: CPT

## 2023-03-10 PROCEDURE — 36415 COLL VENOUS BLD VENIPUNCTURE: CPT

## 2023-03-10 PROCEDURE — 97530 THERAPEUTIC ACTIVITIES: CPT

## 2023-03-10 PROCEDURE — 97110 THERAPEUTIC EXERCISES: CPT

## 2023-03-10 PROCEDURE — 85025 COMPLETE CBC W/AUTO DIFF WBC: CPT

## 2023-03-10 PROCEDURE — 82962 GLUCOSE BLOOD TEST: CPT

## 2023-03-10 PROCEDURE — 70450 CT HEAD/BRAIN W/O DYE: CPT

## 2023-03-10 PROCEDURE — 65270000046 HC RM TELEMETRY

## 2023-03-10 PROCEDURE — 73100 X-RAY EXAM OF WRIST: CPT

## 2023-03-10 PROCEDURE — 80053 COMPREHEN METABOLIC PANEL: CPT

## 2023-03-10 PROCEDURE — 74011250637 HC RX REV CODE- 250/637: Performed by: INTERNAL MEDICINE

## 2023-03-10 PROCEDURE — 74011250636 HC RX REV CODE- 250/636: Performed by: NURSE PRACTITIONER

## 2023-03-10 RX ORDER — HEPARIN SODIUM 5000 [USP'U]/ML
5000 INJECTION, SOLUTION INTRAVENOUS; SUBCUTANEOUS EVERY 12 HOURS
Status: DISCONTINUED | OUTPATIENT
Start: 2023-03-10 | End: 2023-03-15 | Stop reason: HOSPADM

## 2023-03-10 RX ADMIN — TRAMADOL HYDROCHLORIDE 50 MG: 50 TABLET ORAL at 12:15

## 2023-03-10 RX ADMIN — METOPROLOL TARTRATE 50 MG: 50 TABLET ORAL at 12:14

## 2023-03-10 RX ADMIN — Medication: at 21:26

## 2023-03-10 RX ADMIN — TRAMADOL HYDROCHLORIDE 50 MG: 50 TABLET ORAL at 05:53

## 2023-03-10 RX ADMIN — MORPHINE SULFATE 2 MG: 2 INJECTION, SOLUTION INTRAMUSCULAR; INTRAVENOUS at 07:50

## 2023-03-10 RX ADMIN — AMLODIPINE BESYLATE 5 MG: 5 TABLET ORAL at 12:15

## 2023-03-10 RX ADMIN — HEPARIN SODIUM 5000 UNITS: 5000 INJECTION INTRAVENOUS; SUBCUTANEOUS at 17:52

## 2023-03-10 NOTE — PROGRESS NOTES
Comprehensive Nutrition Assessment    Type and Reason for Visit: Initial, Positive nutrition screen    Nutrition Recommendations/Plan:   Continue diet as ordered; monitor K+ levels and adjust diet as needed. RD adjusted ONS from Ensure Plus to Nepro BID given prior elevated K+ levels. Please document % meals and supplements consumed in flowsheet I/O's under intake. Malnutrition Assessment:  Malnutrition Status: Moderate malnutrition (03/10/23 1452)    Context:  Acute illness     Findings of the 6 clinical characteristics of malnutrition:   Energy Intake:     Weight Loss:  Greater than 5% over 1 month     Body Fat Loss:  Mild body fat loss, Triceps, Buccal region   Muscle Mass Loss:  Mild muscle mass loss, Clavicles (pectoralis & deltoids)  Fluid Accumulation:  Unable to assess,     Strength:  Not performed     Nutrition Assessment:    3/10: Chart reviewed; med noted for acute on chronic kidney injury on admission, previously elevated K+ but now WNL. RD received an auto-nutrition referral per poor appetite identified on admission screen. RD visited with pt at bedside, reports decreased appetite which was also confirmed per po intake at breakfast and lunch. Breakfast tray untouched when I arrived; was able to get pt to take a bite of the french toast and eggs. Only consumed a couple bites of the dessert at lunch, declined entree/sides. RD was able to encourage pt to consume most of her Ensure shake. Difficult to determine actual weight loss as suspect pt has not lost 60 lbs per documentation x 1 month. Pt stated weight 161 lbs; weight documented was 149 lbs on current admission but no weight source. Weight documented last month was 209 lbs. Pt does reveal moderate muscle wasting and mild fat loss meeting criteria for moderate malnutrition.     Last Weight Metric  Weight Loss Metrics 3/9/2023 3/8/2023 3/8/2023 1/12/2023 1/5/2023 12/20/2022 12/5/2022   Today's Wt - - 149 lb 9.6 oz - 209 lb 207 lb 207 lb BMI 25.68 kg/m2 25.68 kg/m2 - 35.87 kg/m2 35.87 kg/m2 35.53 kg/m2 35.53 kg/m2      Nutrition Related Findings:    BM: 3/9; Labs: Na+ 148, K+ now WNL; Meds: reviewed Wound Type: None    Current Nutrition Intake & Therapies:        ADULT DIET Regular  ADULT ORAL NUTRITION SUPPLEMENT Breakfast, Dinner; Renal Supplement    Anthropometric Measures:  Height: 5' 4\" (162.6 cm)  Ideal Body Weight (IBW): 120 lbs (55 kg)     Current Body Wt:  67.9 kg (149 lb 11.1 oz), 124.7 % IBW. Stated  Current BMI (kg/m2): 25.7                          BMI Category: Overweight (BMI 25.0-29. 9)    Estimated Daily Nutrient Needs:  Energy Requirements Based On: Formula  Weight Used for Energy Requirements: Current  Energy (kcal/day): 4304-4461 (BMR x 1. 3AF)  Weight Used for Protein Requirements: Current  Protein (g/day): 68 (1.0 g/kg bw)  Method Used for Fluid Requirements: 1 ml/kcal  Fluid (ml/day): 7075-0907 ml/day    Nutrition Diagnosis:   Inadequate protein-energy intake related to  (decreased appetite) as evidenced by intake 0-25%, weight loss    Nutrition Interventions:   Food and/or Nutrient Delivery: Continue current diet, Modify oral nutrition supplement  Nutrition Education/Counseling: No recommendations at this time  Coordination of Nutrition Care: Continue to monitor while inpatient       Goals:     Goals: PO intake 50% or greater, by next RD assessment       Nutrition Monitoring and Evaluation:   Behavioral-Environmental Outcomes: None identified  Food/Nutrient Intake Outcomes: Food and nutrient intake, Supplement intake  Physical Signs/Symptoms Outcomes: Biochemical data, Skin, Weight    Discharge Planning:    Continue current diet, Continue oral nutrition supplement    Chanel Bruno RD  Contact:

## 2023-03-10 NOTE — PROGRESS NOTES
Hospitalist Progress Note    Subjective:   Daily Progress Note: 3/10/2023 4:55 PM    Hospital Course: Ms. Jennifer Zamora is a 80year old female who presented to the emergency room status post fall to left knee. Patient reports she was getting ready to drive to [place] for a wound care appointment. She was outside standing on her steps when her left knee gave out and she slipped to the ground. No significant injury. She normally ambulates with a walker. She has chronic stasis wounds/ulcerations of both lower legs. On chart review she was first seen in the [facility] wound care clinic on February 15, 2023. She has bilateral lower leg edema and some small scattered ulcers. They have been wrapping her legs from base of the toes to her upper calf as of late. With 2 layer compression wraps. Past medical history of CKD, high cholesterol and HTN    Subjective: Patient observed resting in bed with eyes opened. Alert to person and place. Complains of soreness to left forearm. Denies chest pain, shortness of breath, palpitations, lower extremity pain/swelling, dizziness or distress. No acute distress noted.     Current Facility-Administered Medications   Medication Dose Route Frequency    acetaminophen (TYLENOL) tablet 650 mg  650 mg Oral Q6H PRN    Or    acetaminophen (TYLENOL) suppository 650 mg  650 mg Rectal Q6H PRN    polyethylene glycol (MIRALAX) packet 17 g  17 g Oral DAILY PRN    promethazine (PHENERGAN) tablet 12.5 mg  12.5 mg Oral Q6H PRN    Or    ondansetron (ZOFRAN) injection 4 mg  4 mg IntraVENous Q6H PRN    metoprolol tartrate (LOPRESSOR) tablet 50 mg  50 mg Oral BID    [Held by provider] furosemide (LASIX) tablet 40 mg  40 mg Oral DAILY    ammonium lactate (LAC-HYDRIN) 12 % lotion   Topical BID    0.9% sodium chloride infusion  100 mL/hr IntraVENous CONTINUOUS    traMADoL (ULTRAM) tablet 50 mg  50 mg Oral Q6H PRN    amLODIPine (NORVASC) tablet 5 mg  5 mg Oral DAILY    morphine injection 2 mg  2 mg IntraVENous Q6H PRN        Review of Systems:  Constitutional:  Negative for activity change, chills and fever. HENT:  Negative for congestion and sore throat. Eyes:  Negative for pain and redness. Respiratory:  Negative for cough, chest tightness and shortness of breath. Cardiovascular:  Negative for chest pain and palpitations. Gastrointestinal:  Negative for abdominal pain, diarrhea, nausea and vomiting. Genitourinary:  Negative for dysuria, frequency and urgency. Musculoskeletal:  Negative for back pain and neck pain. Skin:  Positive for wound. Negative for rash. Neurological:  Negative for syncope, light-headedness and headaches. Psychiatric/Behavioral:  Negative for confusion. All other systems reviewed and are negative. Objective:     Visit Vitals  BP (!) 122/54   Pulse 73   Temp 97.7 °F (36.5 °C)   Resp 18   Ht 5' 4\" (1.626 m)   SpO2 100%   Breastfeeding No   BMI 25.68 kg/m²      O2 Device: None (Room air)    Temp (24hrs), Av.4 °F (36.9 °C), Min:97.7 °F (36.5 °C), Max:98.8 °F (37.1 °C)      No intake/output data recorded.  1901 - 03/10 0700  In: 1468.8 [P.O.:400; I.V.:1068.8]  Out: 350 [Urine:350]    PHYSICAL EXAM:    Physical Exam  HENT:      Head: Normocephalic and atraumatic. Right Ear: External ear normal.      Left Ear: External ear normal.      Nose: Nose normal.      Mouth/Throat:      Pharynx: Oropharynx is clear. Eyes:      Conjunctiva/sclera: Conjunctivae normal.   Cardiovascular:      Rate and Rhythm: Normal rate and regular rhythm. Pulses: Normal pulses. Heart sounds: Murmur heard. Pulmonary:      Effort: Pulmonary effort is normal.      Breath sounds: Normal breath sounds. Abdominal:      General: Bowel sounds are normal.   Musculoskeletal:         General: Normal range of motion. Cervical back: Normal range of motion. Skin:     General: Skin is warm and dry.       Capillary Refill: Capillary refill takes 2 to 3 seconds. Comments: Compression dressings to bilateral lower extremities intact. Motion and sensation present to toes and legs. Neurological:      Mental Status: She is alert. Comments: Oriented to person and place. Psychiatric:         Mood and Affect: Mood normal.          Data Review    Recent Results (from the past 24 hour(s))   METABOLIC PANEL, COMPREHENSIVE    Collection Time: 03/10/23  3:30 AM   Result Value Ref Range    Sodium 148 (H) 136 - 145 mmol/L    Potassium 4.4 3.5 - 5.1 mmol/L    Chloride 121 (H) 97 - 108 mmol/L    CO2 23 21 - 32 mmol/L    Anion gap 4 (L) 5 - 15 mmol/L    Glucose 98 65 - 100 mg/dL    BUN 74 (H) 6 - 20 MG/DL    Creatinine 2.36 (H) 0.55 - 1.02 MG/DL    BUN/Creatinine ratio 31 (H) 12 - 20      eGFR 19 (L) >60 ml/min/1.73m2    Calcium 8.7 8.5 - 10.1 MG/DL    Bilirubin, total 1.2 (H) 0.2 - 1.0 MG/DL    ALT (SGPT) 21 12 - 78 U/L    AST (SGOT) 17 15 - 37 U/L    Alk. phosphatase 96 45 - 117 U/L    Protein, total 6.6 6.4 - 8.2 g/dL    Albumin 2.0 (L) 3.5 - 5.0 g/dL    Globulin 4.6 (H) 2.0 - 4.0 g/dL    A-G Ratio 0.4 (L) 1.1 - 2.2     CBC WITH AUTOMATED DIFF    Collection Time: 03/10/23  3:30 AM   Result Value Ref Range    WBC 13.6 (H) 3.6 - 11.0 K/uL    RBC 3.20 (L) 3.80 - 5.20 M/uL    HGB 9.7 (L) 11.5 - 16.0 g/dL    HCT 30.7 (L) 35.0 - 47.0 %    MCV 95.9 80.0 - 99.0 FL    MCH 30.3 26.0 - 34.0 PG    MCHC 31.6 30.0 - 36.5 g/dL    RDW 19.3 (H) 11.5 - 14.5 %    PLATELET 159 499 - 425 K/uL    MPV 11.4 8.9 - 12.9 FL    NRBC 0.0 0  WBC    ABSOLUTE NRBC 0.00 0.00 - 0.01 K/uL    NEUTROPHILS 80 (H) 32 - 75 %    LYMPHOCYTES 6 (L) 12 - 49 %    MONOCYTES 11 5 - 13 %    EOSINOPHILS 2 0 - 7 %    BASOPHILS 0 0 - 1 %    IMMATURE GRANULOCYTES 1 (H) 0.0 - 0.5 %    ABS. NEUTROPHILS 10.9 (H) 1.8 - 8.0 K/UL    ABS. LYMPHOCYTES 0.8 0.8 - 3.5 K/UL    ABS. MONOCYTES 1.5 (H) 0.0 - 1.0 K/UL    ABS. EOSINOPHILS 0.3 0.0 - 0.4 K/UL    ABS. BASOPHILS 0.0 0.0 - 0.1 K/UL    ABS. IMM.  GRANS. 0.1 (H) 0.00 - 0.04 K/UL    DF SMEAR SCANNED      RBC COMMENTS ANISOCYTOSIS  1+       GLUCOSE, POC    Collection Time: 03/10/23  8:11 AM   Result Value Ref Range    Glucose (POC) 88 65 - 117 mg/dL    Performed by [facility] PCT    GLUCOSE, POC    Collection Time: 03/10/23 11:23 AM   Result Value Ref Range    Glucose (POC) 77 65 - 117 mg/dL    Performed by 15 [facility] PCT    URINALYSIS W/ REFLEX CULTURE    Collection Time: 03/10/23  3:14 PM    Specimen: Urine   Result Value Ref Range    Color YELLOW/STRAW      Appearance CLEAR CLEAR      Specific gravity 1.015      pH (UA) 5.0 5.0 - 8.0      Protein TRACE (A) NEG mg/dL    Glucose Negative NEG mg/dL    Ketone Negative NEG mg/dL    Bilirubin Negative NEG      Blood Negative NEG      Urobilinogen 0.2 0.2 - 1.0 EU/dL    Nitrites Negative NEG      Leukocyte Esterase Negative NEG      UA:UC IF INDICATED CULTURE NOT INDICATED BY UA RESULT CNI      WBC 0-4 0 - 4 /hpf    RBC 0-5 0 - 5 /hpf    Epithelial cells FEW FEW /lpf    Bacteria Negative NEG /hpf    Hyaline cast 2-5 0 - 2 /lpf   GLUCOSE, POC    Collection Time: 03/10/23  4:40 PM   Result Value Ref Range    Glucose (POC) 141 (H) 65 - 117 mg/dL    Performed by Buzz Ivans PCT        XR WRIST LT AP/LAT   Final Result   Diffuse soft tissue swelling without acute osseous abnormality. US RETROPERITONEUM COMP   Final Result   1. No hydronephrosis. Atrophic right kidney. 2.  Large calcified uterine fibroid. CT HEAD WO CONT    (Results Pending)       Active Problems:    AGATA (acute kidney injury) ([address] (3/9/2023)        Assessment/Plan:   AGATA       Hypernatremia  - etiology likely secondary to dehydration  - bladder scan, if greater than 500ml place gomez catheter  - us retroperitoneum shows no hydronephrosis, atrophic right kidney, large calcified uterine fibroid  - strict I&O  - hold lasix  - continue IVF  - nephrology following      2.  Bilateral lower extremity wounds      Leukocytosis   - compression dressings noted   - wound care following  -  paired blood cultures ordered  -  start empiric antibiotic coverage     3. Hyperkalemia - resolved     4. Acute metabolic encephalopathy  - etiology possibly related to bilateral lower extremity wounds  - CT of head shows: mild generalized volume loss. Extensive while matter hypo densities may reflect chronic microangiopathic change. No acute intracranial abnormality. Left maxillary sinus mucosal thickening and fluid, may reflect acute sinusitis. - urinalysis negative for nitrites, leukocytes and bacteria  - monitor for changes in mentation  - start empiric antibiotic coverage    5. Fall - PTA   - left wrist and forearm discomfort  - xray shows diffuse soft tissue swelling without acute osseous abnormality  - fall precautions      PT/OT recommend SNF at discharge    Discharge disposition: pending hospital course. Nephrology following. CM to follow for discharge planning.     DVT Prophylaxis: Heparin  Code Status:  Full Code  POA: Bairon Pérez, sister [phone]    Care Plan discussed with: patient, nursing, CM  _______________________________________________________________    Darshana Guidry NP

## 2023-03-10 NOTE — PROGRESS NOTES
Problem: Mobility Impaired (Adult and Pediatric)  Goal: *Acute Goals and Plan of Care (Insert Text)  Description: FUNCTIONAL STATUS PRIOR TO ADMISSION: Pt not the best historian, but reports that she uses her rollator and cane for ambulation. HOME SUPPORT PRIOR TO ADMISSION: The patient lived alone with youngest sister to provide assistance. Physical Therapy Goals  Initiated 3/9/2023  1. Patient will move from supine to sit and sit to supine , scoot up and down, and roll side to side in bed with minimal assistance/contact guard assist within 7 day(s). 2.  Patient will transfer from bed to chair and chair to bed with minimal assistance/contact guard assist using the least restrictive device within 7 day(s). 3.  Patient will perform sit to stand with minimal assistance/contact guard assist within 7 day(s). 4.  Patient will ambulate with contact guard assist for 50 feet with the least restrictive device within 7 day(s). Outcome: Not Progressing Towards Goal   PHYSICAL THERAPY TREATMENT  Patient: Rachna Grigsby (32 y.o. female)  Date: 3/10/2023  Diagnosis: AGATA (acute kidney injury) ([facility] [address] [N17.9] <principal problem not specified>      Precautions: Bed Alarm, Fall, Skin, WBAT (fell 3-8 with L knee/wrist P!; SEVERE B LE wounds, cate/sacral wounds)  Chart, physical therapy assessment, plan of care and goals were reviewed. ASSESSMENT  Patient continues with skilled PT services and is not progressing towards goals. Pt continues painful up to 10/10 in BLE with activity and WB/standing attempts as well as new significant pain around L wrist IV. She was unable to tolerate use of L hand to scoot or push. RN and NP aware. Pt was max to total A of 2 to move to and from edge of bed. She needed total A to scoot. She attempted sit to stand from partly elevated bed with max to total A of 2 with trunk support and HHA but she was unable to achieve full stand.  Pt returned to supine with prevalon boots in place and pillow support under knees and calves. Call bell in reach. Current Level of Function Impacting Discharge (mobility/balance): max to total A of 2, continued 10/10 pain LEs with new pain L IV site/wrist as noted    Other factors to consider for discharge: pt is well below her independent baseline, lived alone         PLAN :  Patient continues to benefit from skilled intervention to address the above impairments. Continue treatment per established plan of care. to address goals. Recommendation for discharge: (in order for the patient to meet his/her long term goals)  Therapy up to 5 days/week in SNF setting    This discharge recommendation:  Has been made in collaboration with the attending provider and/or case management    IF patient discharges home will need the following DME: to be determined (TBD)       SUBJECTIVE:   Patient stated Thank you.     OBJECTIVE DATA SUMMARY:   Critical Behavior:  Neurologic State: Alert, Appropriate for age  Orientation Level: Oriented to place, Oriented to person, Oriented to situation, Disoriented to time  Cognition: Follows commands (recommend cog screen; lived alone PTA; limited by severe p! 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 w/Parkinsons who  5-65)  Safety/Judgement: Fall prevention, Awareness of environment, Insight into deficits, Decreased insight into deficits  Functional Mobility Training:  Bed Mobility:  Rolling: Maximum assistance; Total assistance;Assist x2  Supine to Sit: Maximum assistance;Assist x2; Additional time; Total assistance  Sit to Supine: Maximum assistance; Total assistance;Assist x2; Additional time  Scooting: Total assistance;Assist x2; Additional time; Adaptive equipment (unable to bend forward; poor tolerance for feet on the floor or L wrist to aide scoot)        Transfers:  Sit to Stand: Maximum assistance; Total assistance;Assist x2; Additional time (unable to tolerate full standing with pain B LEs 10/10 at rest and worse in standing)  Stand to Sit: Moderate assistance;Assist x2        Bed to Chair:  (unable to stand/step to chair this date)                    Balance:  Sitting: Impaired  Sitting - Static: Good (unsupported); Fair (occasional)  Sitting - Dynamic: Fair (occasional) (unable to bend forward; poor tolerance for feet on the floor or L wrist to aide scoot)  Standing: Impaired (attempted SPT to chair; unable tocome to full stand; or take a step w/max A x 2 from raised bed)      Activity Tolerance:   Poor    After treatment patient left in no apparent distress:   Supine in bed, Heels elevated for pressure relief, Call bell within reach, Bed / chair alarm activated, and Side rails x 3    COMMUNICATION/COLLABORATION:   The patients plan of care was discussed with: Occupational therapist and Registered nurse.      Hue Chester, PT   Time Calculation: 42 mins

## 2023-03-10 NOTE — PROGRESS NOTES
NAME: Raymon Torres        :  1933        MRN:  [id number]                 Assessment   :                                               Plan:  CKD-4  AGATA  Edema  HTN  Anemia    Pt was supposed to see Dr. Tyler Talbert as an outpatient for CKD-4. Creatinine 2.0 in 2022. Urine sediment bland. US shows  no hydro and a small right kidney. NEMO/LCR pending      AGATA likely volume related. Creatinine better with IVF. Continue IVF. Hold lasix. Watch volume status. Subjective:     Chief Complaint:  No complaints. Review of Systems:    Symptom Y/N Comments  Symptom Y/N Comments   Fever/Chills    Chest Pain     Poor Appetite    Edema     Cough    Abdominal Pain     Sputum    Joint Pain     SOB/AMADOR    Pruritis/Rash     Nausea/vomit    Tolerating PT/OT     Diarrhea    Tolerating Diet     Constipation    Other       Could not obtain due to:      Objective:     VITALS:   Last 24hrs VS reviewed since prior progress note.  Most recent are:  Visit Vitals  BP (!) 122/53   Pulse 79   Temp 98.8 °F (37.1 °C)   Resp 18   SpO2 100%   Breastfeeding No       Intake/Output Summary (Last 24 hours) at 3/10/2023 0949  Last data filed at 3/10/2023 0242  Gross per 24 hour   Intake 1208.75 ml   Output 350 ml   Net 858.75 ml      Telemetry Reviewed:     PHYSICAL EXAM:  General: NAD  + edema      Lab Data Reviewed: (see below)    Medications Reviewed: (see below)    PMH/SH reviewed - no change compared to H&P  ________________________________________________________________________  Care Plan discussed with:  Patient     Family      RN     Care Manager                    Consultant:          Comments   >50% of visit spent in counseling and coordination of care       ________________________________________________________________________  Angelika Scott MD     Procedures: see electronic medical records for all procedures/Xrays and details which  were not copied into this note but were reviewed prior to creation of Plan. LABS:  Recent Labs     03/10/23  0330 03/09/23  0522   WBC 13.6* 16.4*   HGB 9.7* 10.1*   HCT 30.7* 31.4*    307     Recent Labs     03/10/23  0330 03/09/23  0627 03/08/23  2032   * 144 144   K 4.4 4.4 5.4*   * 118* 111*   CO2 23 22 22   BUN 74* 101* 114*   CREA 2.36* 2.99* 3.57*   GLU 98 110* 125*   CA 8.7 8.8 9.0     Recent Labs     03/10/23  0330 03/09/23  0627   AP 96 112   TP 6.6 7.2   ALB 2.0* 2.2*   GLOB 4.6* 5.0*     No results for input(s): INR, PTP, APTT, INREXT in the last 72 hours. No results for input(s): FE, TIBC, PSAT, FERR in the last 72 hours. No results found for: FOL, RBCF   No results for input(s): PH, PCO2, PO2 in the last 72 hours. No results for input(s): CPK, CKMB in the last 72 hours.     No lab exists for component: TROPONINI  No components found for: [place]  Lab Results   Component Value Date/Time    Color YELLOW/STRAW 03/08/2023 01:30 PM    Appearance CLEAR 03/08/2023 01:30 PM    Specific gravity 1.025 03/08/2023 01:30 PM    pH (UA) 6.0 03/08/2023 01:30 PM    Protein Negative 03/08/2023 01:30 PM    Glucose Negative 03/08/2023 01:30 PM    Ketone Negative 03/08/2023 01:30 PM    Bilirubin Negative 03/08/2023 01:30 PM    Urobilinogen 0.2 03/08/2023 01:30 PM    Nitrites Negative 03/08/2023 01:30 PM    Leukocyte Esterase Negative 03/08/2023 01:30 PM    Epithelial cells FEW 03/08/2023 01:30 PM    Bacteria Negative 03/08/2023 01:30 PM    WBC 0-4 03/08/2023 01:30 PM    RBC 0-5 03/08/2023 01:30 PM       MEDICATIONS:  Current Facility-Administered Medications   Medication Dose Route Frequency    acetaminophen (TYLENOL) tablet 650 mg  650 mg Oral Q6H PRN    Or    acetaminophen (TYLENOL) suppository 650 mg  650 mg Rectal Q6H PRN    polyethylene glycol (MIRALAX) packet 17 g  17 g Oral DAILY PRN    promethazine (PHENERGAN) tablet 12.5 mg  12.5 mg Oral Q6H PRN    Or    ondansetron [facility]) injection 4 mg  4 mg IntraVENous Q6H PRN    metoprolol tartrate (LOPRESSOR) tablet 50 mg  50 mg Oral BID    [Held by provider] furosemide (LASIX) tablet 40 mg  40 mg Oral DAILY    ammonium lactate (LAC-HYDRIN) 12 % lotion   Topical BID    0.9% sodium chloride infusion  100 mL/hr IntraVENous CONTINUOUS    traMADoL (ULTRAM) tablet 50 mg  50 mg Oral Q6H PRN    amLODIPine (NORVASC) tablet 5 mg  5 mg Oral DAILY    morphine injection 2 mg  2 mg IntraVENous Q6H PRN

## 2023-03-10 NOTE — PROGRESS NOTES
End of Shift Note    Bedside shift change report given to Ghazal Jennings RN (oncoming nurse) by Humera Cheung LPN (offgoing nurse). Report included the following information SBAR, Kardex, and MAR    Shift worked:  7p-7:30a     Shift summary and any significant changes:     Pt denies pain with no hands on contact. Pt complained of generalized pain during patient care, zinc cream applied to sacral and pt repositioned. PRN pain med administered to treat pt pain. Pt is tearful. Pt wound care dressing intact. Pt heels elevated. Pt routine rounding performed, and call bell within reach. Pt am scheduled labs drawn.  Pt lines patent with  continuous fluids infusing   Concerns for physician to address:       Zone phone for oncoming shift:            Humera Cheung LPN

## 2023-03-10 NOTE — PROGRESS NOTES
Problem: Self Care Deficits Care Plan (Adult)  Goal: *Acute Goals and Plan of Care (Insert Text)  Description: FUNCTIONAL STATUS PRIOR TO ADMISSION:  She endorses use of cane and rollator for functional mobility. Note patient admitted with extensive wounds suggesting difficulty caring for herself recently. 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 \" (For spouse or patient?) Patient indicates she is not sure when wounds became so bad, but reports she was focused on spouse and felt she ignored the wounds due to her caregiver role. (This patient is 80 yrs old and looks FAR younger.)    HOME SUPPORT: Patient lives alone. Her sister provides assistance with groceries and transportation, checking in occasionally. Per sister, when being picked up for medical appointments, she is usually alert, oriented, and fully dressed upon sister's arrival to the house. Consistently goes to wound care appointments- family drives her    Occupational Therapy Goals  Initiated 3/9/2023  1. Patient will perform seated grooming with standby assistance within 7 day(s). 2.  Patient will perform self-feeding with standby assistance within 7 day(s). 3.  Patient will perform lower body dressing with moderate assistance within 7 day(s). 4.  Patient will perform toilet transfers to [facility] with standby assistance using RW prn within 7 day(s). 5.  Patient will perform all aspects of toileting with moderate assistance within 7 day(s). 6.  Patient will participate in cog screening to aide d/c planning within 7 days  7. Patient will participate in B UE AROM HEP training (clarify L wrist p! Cause) with S within 7 days  8.   Patient will demonstrate understanding fall prevention, energy conservation, pain management and pressure relief w/verbal cues within 7 days  3/10/2023 1053 by Susan Herrera  Outcome: Progressing Towards Goal  OCCUPATIONAL THERAPY TREATMENT  Patient: Reji Kauffman (89 y.o. female)  Date: 3/10/2023  Diagnosis: [facility] [N17.9] <principal problem not specified>      Precautions: Bed Alarm, Fall, Skin, WBAT (fell 3-8 with L knee/wrist P!; SEVERE B LE wounds, cate/sacral wounds)  Chart, occupational therapy assessment, plan of care, and goals were reviewed. ASSESSMENT  Patient continues with skilled OT services and is progressing slowly towards goals. Patient very limited by wound pain B LEs as well as pain L wrist IV malfunction vs injury @time of fall 3-8 when she injured L knee/had L knee buckle. Patient reports, \"I really don't remember what happened. \"  Less able to participate effectively today due to pain levels B LEs and L wrist but she did agree to to all that was asked of her. Barthel Index declined from 35/100 to 15/100 from yesterday to today. recommend calorie count  to maximize potential for wound healing    Current Level of Function Impacting Discharge (ADLs): S to drink, holding cup R hand only, otherwise D self care and functional mobility this session    Other factors to consider for discharge: will need 24/7 care at discharge         PLAN :  Patient continues to benefit from skilled intervention to address the above impairments. Continue treatment per established plan of care to address goals.     Recommend with staff: maged to chair at least 1 x/day preferably not at meal time so she can have less pain at rest in bed and possibly eat more; recommend calorie count        Recommend next OT session: cog screen; L wrist assessment, up to chair with max A x2    Recommendation for discharge: (in order for the patient to meet his/her long term goals)  Therapy up to 5 days/week in SNF setting    This discharge recommendation:  A follow-up discussion with the attending provider and/or case management is planned    IF patient discharges home will need the following DME: TBA       SUBJECTIVE:   Patient stated [facility] got me that motorized bed.     OBJECTIVE DATA SUMMARY:   Cognitive/Behavioral Status:  Neurologic State: Alert; Appropriate for age     Cognition: Follows commands (recommend cog screen; lived alone PTA; limited by severe p! 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 w/Parkinsons who  5-65)  Perception: Appears intact  Perseveration: No perseveration noted  Safety/Judgement: Fall prevention; Awareness of environment; Insight into deficits; Decreased insight into deficits    Functional Mobility and Transfers for ADLs:  Bed Mobility:  Rolling: Maximum assistance; Total assistance;Assist x2  Supine to Sit: Maximum assistance;Assist x2; Additional time; Total assistance  Sit to Supine: Maximum assistance; Total assistance;Assist x2; Additional time  Scooting: Total assistance;Assist x2; Additional time; Adaptive equipment (unable to bend forward; poor tolerance for feet on the floor or L wrist to aide scoot)    Transfers:  Sit to Stand: Maximum assistance; Total assistance;Assist x2; Additional time (unable to tolerate full standing with pain B LEs 10/10 at rest and worse in standing)  Functional Transfers  Toilet Transfer :  (not yet able due to pain B LEs and L wrist)  Bed to Chair:  (unable to stand/step to chair this date)    Balance:  Sitting: Impaired  Sitting - Static: Good (unsupported); Fair (occasional)  Sitting - Dynamic: Fair (occasional) (unable to bend forward; poor tolerance for feet on the floor or L wrist to aide scoot)  Standing: Impaired (attempted SPT to chair; unable tocome to full stand; or take a step w/max A x 2 from raised bed)    ADL Intervention:  Feeding  Feeding Assistance: Supervision (drank w/S; reports ++ thirst; No food or supplemental drink taken; \"not hungry\"   Recommend calorie count)    Grooming  Grooming Assistance:  Moderate assistance;Maximum assistance (limited by L wrist pain and B LE pain and cate regional pain) Upper Body Dressing Assistance  Dressing Assistance: Total assistance(dependent); Maximum assistance    Lower Body Dressing Assistance  Dressing Assistance: Total assistance(dependent)  Socks:  (adapted B non skid socks with full length cut open to decrease shear on B feet with application; taped in place, removed post tx)    Toileting  Toileting Assistance: Total assistance(dependent) (unable to get to [facility] safely due to pain)    Cognitive Retraining  Attention to Task: Single task (not certain how to use call bell or TV controls)  Maintains Attention For (Time): Greater than 10 minutes  Following Commands: Follows one step commands/directions  Safety/Judgement: Fall prevention; Awareness of environment; Insight into deficits; Decreased insight into deficits      Barthel Index:  Bathin  Bladder: 5  Bowels: 5  Groomin  Dressin  Feedin  Mobility: 0  Stairs: 0  Toilet Use: 0  Transfer (Bed to Chair and Back): 0  Total: 15/100      The Barthel ADL Index: Guidelines  1. The index should be used as a record of what a patient does, not as a record of what a patient could do. 2. The main aim is to establish degree of independence from any help, physical or verbal, however minor and for whatever reason. 3. The need for supervision renders the patient not independent. 4. A patient's performance should be established using the best available evidence. Asking the patient, friends/relatives and nurses are the usual sources, but direct observation and common sense are also important. However direct testing is not needed. 5. Usually the patient's performance over the preceding 24-48 hours is important, but occasionally longer periods will be relevant. 6. Middle categories imply that the patient supplies over 50 per cent of the effort. 7. Use of aids to be independent is allowed.     Score Interpretation (from [address] [address])    Independent   60-79 Minimally independent   40-59 Partially dependent 20-39 Very dependent   <20 Totally dependent     -Liz Schultz, Barthel, D.W. (9567). Functional evaluation: the Barthel Index. [address] ([address] (1997). The Barthel activities of daily living index: self-reporting versus actual performance in the old (> or = 75 years). Journal of [address] [place] [address] 45(7), 14 [facility], J.GILLIANF, Denise Gale., Megan Villa. (1999). Measuring the change in disability after inpatient rehabilitation; comparison of the responsiveness of the Barthel Index and Functional [place] Measure. Journal of Neurology, Neurosurgery, and Psychiatry, 66(4), 129-382. Fatmata Dumont, ISAIAH, ALLIE Fam, & Ben Davidson MVIVI. (2004) Assessment of post-stroke quality of life in cost-effectiveness studies: The usefulness of the Barthel Index and the EuroQoL-5D. Quality of Life Research, 13, 427-43      Pain:  10/10 B LEs, L wrist; NP and RN aware; patient has been medicated; note air mattress device recommended by wound care    Activity Tolerance:   Poor, requires frequent rest breaks, and very limited by pain    After treatment patient left in no apparent distress:   Supine in bed, Heels elevated for pressure relief, Call bell within reach, Bed / chair alarm activated, and Side rails x 3    COMMUNICATION/COLLABORATION:   The patients plan of care was discussed with: Physical therapist, Registered nurse, Certified nursing assistant/patient care technician, and NP .      Leslee Farias OTR/L  Time Calculation: 47 mins

## 2023-03-10 NOTE — PROGRESS NOTES
CM: Aisha Heimlich is currently working with pt in the [facility]. CM reviewed clinicals and pt is being recommended for SNF placement at the time of d/c. CM will contact pts sister: Johan Gómez, to review recommendations. CM spoke with Johan Gómez, via telephone to review d/c needs and plans. Johan Gómez is agreeable to snf placement at the time of d/c, but shared her concerns of LTC once short term care is complete. CM reviewed the following with Johan Gómez, and she plans to contact facilities that can assist with LTC. Johan Gómez, requested for snf list to be sent, via email to review: [email or web address]. CM sent snf list.  Johan Gómez was able to provide CM with 3 options for placement for pt: [address] [place] [address], [facility], and [facility]. CM sent 3 referrals to initiate placement request.      Once accepted, pt will require insurance auth.   INSURANCE AUTH CAN TAKE 24--48HRS, TO APPROVE-ADDITIONAL TIME ON THE WEEKEND    Aisha Heimlich, [address], [address] [facility]

## 2023-03-10 NOTE — PROGRESS NOTES
End of Shift Note    Bedside shift change report given to Bill Olivier (oncoming nurse) by Taryn Price RN (offgoing nurse). Report included the following information SBAR, Kardex, ED Summary, Intake/Output, MAR, and Recent Results    Shift worked:  [id number]     Shift summary and any significant changes:     Pain in right arm; morphine administered in AM. PIV removed. X-ray completed. Ultram administered in afternoon. In afternoon noted that output minimal, now not oriented to place and did not recognize brother-in-law. Notified NP Nahomy Downing. Bladder-scanned patient - 472 mL scanned. Straight-cathed and sent UA and urine culture. Went to CT for head CT. Blood cultures obtained. Antibiotics to be initiated, monitor urine output to cath again at 2300 if no output.       Concerns for physician to address:       Zone phone for oncoming shift:                Taryn Price, RN

## 2023-03-10 NOTE — PROGRESS NOTES
TRANSFER - OUT REPORT:    Verbal report given to Onc(name) on Rachna Grigsby  being transferred to Onc(unit) for routine progression of care       Report consisted of patients Situation, Background, Assessment and   Recommendations(SBAR). Information from the following report(s) SBAR, Intake/Output, MAR, Recent Results, Cardiac Rhythm NSR, and Alarm Parameters  was reviewed with the receiving nurse. Lines:   Peripheral IV 03/08/23 Distal;Left;Posterior Forearm (Active)   Site Assessment Clean, dry, & intact 03/09/23 1628   Phlebitis Assessment 0 03/09/23 1628   Infiltration Assessment 0 03/09/23 1628   Dressing Status Clean, dry, & intact 03/09/23 1628   Dressing Type Transparent 03/09/23 1628   Hub Color/Line Status Patent; Infusing 03/09/23 1628   Action Taken Open ports on tubing capped 03/09/23 1628   Alcohol Cap Used Yes 03/09/23 1628       Peripheral IV 03/09/23 Posterior;Right Wrist (Active)   Site Assessment Clean, dry, & intact 03/09/23 1628   Phlebitis Assessment 0 03/09/23 1628   Infiltration Assessment 0 03/09/23 1628   Dressing Status Clean, dry, & intact 03/09/23 1628   Dressing Type Transparent 03/09/23 1628   Hub Color/Line Status Flushed;Patent; End cap changed 03/09/23 1628   Action Taken Open ports on tubing capped 03/09/23 1628   Alcohol Cap Used Yes 03/09/23 1628        Opportunity for questions and clarification was provided.       Patient transported with:   Monitor  Registered Nurse

## 2023-03-11 LAB
ALBUMIN SERPL-MCNC: 1.9 G/DL (ref 3.5–5)
ALBUMIN/GLOB SERPL: 0.4 (ref 1.1–2.2)
ALP SERPL-CCNC: 104 U/L (ref 45–117)
ALT SERPL-CCNC: 17 U/L (ref 12–78)
ANION GAP SERPL CALC-SCNC: 5 MMOL/L (ref 5–15)
AST SERPL-CCNC: 12 U/L (ref 15–37)
BILIRUB DIRECT SERPL-MCNC: 0.4 MG/DL (ref 0–0.2)
BILIRUB SERPL-MCNC: 1.1 MG/DL (ref 0.2–1)
BUN SERPL-MCNC: 59 MG/DL (ref 6–20)
BUN/CREAT SERPL: 29 (ref 12–20)
CALCIUM SERPL-MCNC: 8.9 MG/DL (ref 8.5–10.1)
CHLORIDE SERPL-SCNC: 117 MMOL/L (ref 97–108)
CO2 SERPL-SCNC: 22 MMOL/L (ref 21–32)
CREAT SERPL-MCNC: 2.05 MG/DL (ref 0.55–1.02)
GLOBULIN SER CALC-MCNC: 4.6 G/DL (ref 2–4)
GLUCOSE BLD STRIP.AUTO-MCNC: 112 MG/DL (ref 65–117)
GLUCOSE BLD STRIP.AUTO-MCNC: 124 MG/DL (ref 65–117)
GLUCOSE BLD STRIP.AUTO-MCNC: 187 MG/DL (ref 65–117)
GLUCOSE BLD STRIP.AUTO-MCNC: 84 MG/DL (ref 65–117)
GLUCOSE SERPL-MCNC: 82 MG/DL (ref 65–100)
POTASSIUM SERPL-SCNC: 4.3 MMOL/L (ref 3.5–5.1)
PROT SERPL-MCNC: 6.5 G/DL (ref 6.4–8.2)
SERVICE CMNT-IMP: ABNORMAL
SERVICE CMNT-IMP: ABNORMAL
SERVICE CMNT-IMP: NORMAL
SERVICE CMNT-IMP: NORMAL
SODIUM SERPL-SCNC: 144 MMOL/L (ref 136–145)

## 2023-03-11 PROCEDURE — 80048 BASIC METABOLIC PNL TOTAL CA: CPT

## 2023-03-11 PROCEDURE — 80076 HEPATIC FUNCTION PANEL: CPT

## 2023-03-11 PROCEDURE — 74011250636 HC RX REV CODE- 250/636: Performed by: NURSE PRACTITIONER

## 2023-03-11 PROCEDURE — 36415 COLL VENOUS BLD VENIPUNCTURE: CPT

## 2023-03-11 PROCEDURE — 74011000258 HC RX REV CODE- 258: Performed by: NURSE PRACTITIONER

## 2023-03-11 PROCEDURE — 65270000046 HC RM TELEMETRY

## 2023-03-11 PROCEDURE — 74011250637 HC RX REV CODE- 250/637: Performed by: STUDENT IN AN ORGANIZED HEALTH CARE EDUCATION/TRAINING PROGRAM

## 2023-03-11 PROCEDURE — 51798 US URINE CAPACITY MEASURE: CPT

## 2023-03-11 PROCEDURE — 74011250636 HC RX REV CODE- 250/636: Performed by: INTERNAL MEDICINE

## 2023-03-11 PROCEDURE — 82962 GLUCOSE BLOOD TEST: CPT

## 2023-03-11 RX ADMIN — PIPERACILLIN AND TAZOBACTAM 4.5 G: 4; .5 INJECTION, POWDER, FOR SOLUTION INTRAVENOUS at 01:43

## 2023-03-11 RX ADMIN — METOPROLOL TARTRATE 50 MG: 50 TABLET ORAL at 08:46

## 2023-03-11 RX ADMIN — SODIUM CHLORIDE 100 ML/HR: 9 INJECTION, SOLUTION INTRAVENOUS at 01:43

## 2023-03-11 RX ADMIN — AMLODIPINE BESYLATE 5 MG: 5 TABLET ORAL at 08:46

## 2023-03-11 RX ADMIN — METOPROLOL TARTRATE 50 MG: 50 TABLET ORAL at 18:35

## 2023-03-11 RX ADMIN — PIPERACILLIN AND TAZOBACTAM 3.38 G: 3; .375 INJECTION, POWDER, LYOPHILIZED, FOR SOLUTION INTRAVENOUS at 22:24

## 2023-03-11 RX ADMIN — PIPERACILLIN AND TAZOBACTAM 3.38 G: 3; .375 INJECTION, POWDER, LYOPHILIZED, FOR SOLUTION INTRAVENOUS at 10:30

## 2023-03-11 RX ADMIN — HEPARIN SODIUM 5000 UNITS: 5000 INJECTION INTRAVENOUS; SUBCUTANEOUS at 06:17

## 2023-03-11 RX ADMIN — MORPHINE SULFATE 2 MG: 2 INJECTION, SOLUTION INTRAMUSCULAR; INTRAVENOUS at 16:18

## 2023-03-11 RX ADMIN — Medication: at 22:23

## 2023-03-11 RX ADMIN — HEPARIN SODIUM 5000 UNITS: 5000 INJECTION INTRAVENOUS; SUBCUTANEOUS at 18:35

## 2023-03-11 NOTE — PROGRESS NOTES
Bedside shift report received from [facility]. Report included the following information SBAR, Kardex, MAR, and Recent Results. This RN verbalized understanding of plan of care with opportunity for clarification and questions. 1316: MD perfect serve. patient is retaining. i see the order from last night to place Gomez if scan <500mL; scan did not meet criteria so it was not placed. I scanned at [phone] for >350, straight cath for <400mL. it seems like she has had issues with retaining since yesterday evening, but there isn't consistent charting on it. patient states that she does not feel like she \"has to go\". per PT note, seems she was max assist to even scoot to end of bed, so putting her on the bedside commode is out of the question. at what point to do we continue the Q6 straight cath versus putting a gomez in? i was going to do another bladder scan around 1600. Plan of care updated with orders for gomez placement. 1338: MD perfect serve. patient just voided, saturated the chux pad and the bed; she said it \"came on all of a sudden\". do you want me to hold off on the gomez for now and reevaluate with the next bladder scan? Plan of care updated. Per MD, hold off on gomez pending 160 bladder scan. Patient voiding. Bedside shift report given to [facility]. Report included the following information SBAR, Kardex, MAR, and Recent Results. Oncoming RN verbalized understanding of plan of care with opportunity for clarification and questions.

## 2023-03-11 NOTE — PROGRESS NOTES
NAME: Annabella Olivier        :  1933        MRN:  [id number]                 Assessment   :                                               Plan:  CKD-4  AGATA  Edema  HTN  Anemia    Pt was supposed to see Dr. Ashtyn Woodruff as an outpatient for CKD-4. Creatinine 2.0 in 2022. Urine sediment bland. US shows  no hydro and a small right kidney. NEMO/LCR pending      AGATA likely volume related. Creatinine better with IVF and back to baseline    Continue to hold Lasix. IV fluid has been discontinued  A.m. labs      D/W pt         Subjective:     Chief Complaint:  c/o itching. No rash  No n/v/cp or sob    Review of Systems:    Symptom Y/N Comments  Symptom Y/N Comments   Fever/Chills    Chest Pain     Poor Appetite    Edema     Cough    Abdominal Pain     Sputum    Joint Pain     SOB/AMADOR    Pruritis/Rash     Nausea/vomit    Tolerating PT/OT     Diarrhea    Tolerating Diet     Constipation    Other       Could not obtain due to:      Objective:     VITALS:   Last 24hrs VS reviewed since prior progress note.  Most recent are:  Visit Vitals  BP (!) 122/56   Pulse 72   Temp 98.1 °F (36.7 °C)   Resp 17   Ht 5' 4\" (1.626 m)   SpO2 98%   Breastfeeding No   BMI 25.68 kg/m²       Intake/Output Summary (Last 24 hours) at 3/11/2023 1635  Last data filed at 3/11/2023 1044  Gross per 24 hour   Intake --   Output 399 ml   Net -399 ml        Telemetry Reviewed:     PHYSICAL EXAM:  General: NAD  + edema- protective pads over both heel  AOx3      Lab Data Reviewed: (see below)    Medications Reviewed: (see below)    PMH/SH reviewed - no change compared to H&P  ________________________________________________________________________  Care Plan discussed with:  Patient     Family      RN     Care Manager                    Consultant:          Comments   >50% of visit spent in counseling and coordination of care ________________________________________________________________________  Drew Norman MD     Procedures: see electronic medical records for all procedures/Xrays and details which  were not copied into this note but were reviewed prior to creation of Plan.       LABS:  Recent Labs     03/10/23  0330 03/09/23  0522   WBC 13.6* 16.4*   HGB 9.7* 10.1*   HCT 30.7* 31.4*    307       Recent Labs     03/11/23  0216 03/10/23  0330 03/09/23  0627    148* 144   K 4.3 4.4 4.4   * 121* 118*   CO2 22 23 22   BUN 59* 74* 101*   CREA 2.05* 2.36* 2.99*   GLU 82 98 110*   CA 8.9 8.7 8.8             MEDICATIONS:  Current Facility-Administered Medications   Medication Dose Route Frequency    heparin (porcine) injection 5,000 Units  5,000 Units SubCUTAneous Q12H    piperacillin-tazobactam (ZOSYN) 3.375 g in 0.9% sodium chloride (MBP/ADV) 100 mL MBP  3.375 g IntraVENous Q12H    acetaminophen (TYLENOL) tablet 650 mg  650 mg Oral Q6H PRN    Or    acetaminophen (TYLENOL) suppository 650 mg  650 mg Rectal Q6H PRN    polyethylene glycol (MIRALAX) packet 17 g  17 g Oral DAILY PRN    promethazine (PHENERGAN) tablet 12.5 mg  12.5 mg Oral Q6H PRN    Or    ondansetron (ZOFRAN) injection 4 mg  4 mg IntraVENous Q6H PRN    metoprolol tartrate (LOPRESSOR) tablet 50 mg  50 mg Oral BID    [Held by provider] furosemide (LASIX) tablet 40 mg  40 mg Oral DAILY    ammonium lactate (LAC-HYDRIN) 12 % lotion   Topical BID    traMADoL (ULTRAM) tablet 50 mg  50 mg Oral Q6H PRN    amLODIPine (NORVASC) tablet 5 mg  5 mg Oral DAILY    morphine injection 2 mg  2 mg IntraVENous Q6H PRN

## 2023-03-11 NOTE — PROGRESS NOTES
Problem: Falls - Risk of  Goal: *Absence of Falls  Description: Document Arnol Barbosa Fall Risk and appropriate interventions in the flowsheet. Outcome: Progressing Towards Goal  Note: Fall Risk Interventions:                                Problem: Pressure Injury - Risk of  Goal: *Prevention of pressure injury  Description: Document Kvng Scale and appropriate interventions in the flowsheet.   Outcome: Progressing Towards Goal  Note: Pressure Injury Interventions:  Sensory Interventions: Assess changes in LOC, Assess need for specialty bed, Avoid rigorous massage over bony prominences    Moisture Interventions: Absorbent underpads, Apply protective barrier, creams and emollients, Assess need for specialty bed, Check for incontinence Q2 hours and as needed, Contain wound drainage    Activity Interventions: Assess need for specialty bed, PT/OT evaluation    Mobility Interventions: Assess need for specialty bed, Float heels, HOB 30 degrees or less, Pressure redistribution bed/mattress (bed type), PT/OT evaluation    Nutrition Interventions: Document food/fluid/supplement intake    Friction and Shear Interventions: Apply protective barrier, creams and emollients, Feet elevated on foot rest, HOB 30 degrees or less

## 2023-03-12 LAB
ALBUMIN SERPL-MCNC: 1.9 G/DL (ref 3.5–5)
ALBUMIN/GLOB SERPL: 0.4 (ref 1.1–2.2)
ALP SERPL-CCNC: 126 U/L (ref 45–117)
ALT SERPL-CCNC: 18 U/L (ref 12–78)
ANION GAP SERPL CALC-SCNC: 6 MMOL/L (ref 5–15)
AST SERPL-CCNC: 15 U/L (ref 15–37)
BILIRUB DIRECT SERPL-MCNC: 0.3 MG/DL (ref 0–0.2)
BILIRUB SERPL-MCNC: 1 MG/DL (ref 0.2–1)
BUN SERPL-MCNC: 53 MG/DL (ref 6–20)
BUN/CREAT SERPL: 24 (ref 12–20)
CALCIUM SERPL-MCNC: 8.9 MG/DL (ref 8.5–10.1)
CHLORIDE SERPL-SCNC: 117 MMOL/L (ref 97–108)
CO2 SERPL-SCNC: 21 MMOL/L (ref 21–32)
CREAT SERPL-MCNC: 2.17 MG/DL (ref 0.55–1.02)
GLOBULIN SER CALC-MCNC: 5 G/DL (ref 2–4)
GLUCOSE BLD STRIP.AUTO-MCNC: 210 MG/DL (ref 65–117)
GLUCOSE SERPL-MCNC: 114 MG/DL (ref 65–100)
POTASSIUM SERPL-SCNC: 4.9 MMOL/L (ref 3.5–5.1)
PROT SERPL-MCNC: 6.9 G/DL (ref 6.4–8.2)
SERVICE CMNT-IMP: ABNORMAL
SODIUM SERPL-SCNC: 144 MMOL/L (ref 136–145)

## 2023-03-12 PROCEDURE — 74011250637 HC RX REV CODE- 250/637: Performed by: INTERNAL MEDICINE

## 2023-03-12 PROCEDURE — 74011250637 HC RX REV CODE- 250/637: Performed by: STUDENT IN AN ORGANIZED HEALTH CARE EDUCATION/TRAINING PROGRAM

## 2023-03-12 PROCEDURE — 80048 BASIC METABOLIC PNL TOTAL CA: CPT

## 2023-03-12 PROCEDURE — 74011000258 HC RX REV CODE- 258: Performed by: NURSE PRACTITIONER

## 2023-03-12 PROCEDURE — 80076 HEPATIC FUNCTION PANEL: CPT

## 2023-03-12 PROCEDURE — 36415 COLL VENOUS BLD VENIPUNCTURE: CPT

## 2023-03-12 PROCEDURE — 74011250636 HC RX REV CODE- 250/636: Performed by: NURSE PRACTITIONER

## 2023-03-12 PROCEDURE — 82962 GLUCOSE BLOOD TEST: CPT

## 2023-03-12 PROCEDURE — 65270000046 HC RM TELEMETRY

## 2023-03-12 RX ADMIN — HEPARIN SODIUM 5000 UNITS: 5000 INJECTION INTRAVENOUS; SUBCUTANEOUS at 18:30

## 2023-03-12 RX ADMIN — TRAMADOL HYDROCHLORIDE 50 MG: 50 TABLET ORAL at 04:00

## 2023-03-12 RX ADMIN — AMLODIPINE BESYLATE 5 MG: 5 TABLET ORAL at 10:09

## 2023-03-12 RX ADMIN — Medication: at 10:11

## 2023-03-12 RX ADMIN — METOPROLOL TARTRATE 50 MG: 50 TABLET ORAL at 18:30

## 2023-03-12 RX ADMIN — HEPARIN SODIUM 5000 UNITS: 5000 INJECTION INTRAVENOUS; SUBCUTANEOUS at 05:41

## 2023-03-12 RX ADMIN — METOPROLOL TARTRATE 50 MG: 50 TABLET ORAL at 10:09

## 2023-03-12 RX ADMIN — PIPERACILLIN AND TAZOBACTAM 3.38 G: 3; .375 INJECTION, POWDER, LYOPHILIZED, FOR SOLUTION INTRAVENOUS at 21:46

## 2023-03-12 RX ADMIN — PIPERACILLIN AND TAZOBACTAM 3.38 G: 3; .375 INJECTION, POWDER, LYOPHILIZED, FOR SOLUTION INTRAVENOUS at 10:14

## 2023-03-12 RX ADMIN — Medication: at 21:44

## 2023-03-12 NOTE — PROGRESS NOTES
Bedside shift change report given to NAHOMY'diya Fernández RN (oncoming nurse) by Kev Mock RN (offgoing nurse). Report included the following information SBAR, Kardex, MAR, and Cardiac Rhythm Sinus Rhythm .

## 2023-03-12 NOTE — PROGRESS NOTES
NAME: Severiano Fielding        :  1933        MRN:  [id number]                 Assessment   :                                               Plan:  CKD-4  AGATA  Edema  HTN  Anemia    Pt was supposed to see Dr. Elaine Jenkins as an outpatient for CKD-4. Creatinine 2.0 in 2022. Urine sediment bland. US shows  no hydro and a small right kidney. NEMO pending. FLC ratio mild high      AGATA likely volume related. Creatinine better with IVF and back to baseline    Continue to hold Lasix. IV fluid has been discontinued  A.m. labs      D/W pt         Subjective:     Chief Complaint:  resting. No acute c/o    Review of Systems:    Symptom Y/N Comments  Symptom Y/N Comments   Fever/Chills    Chest Pain     Poor Appetite    Edema     Cough    Abdominal Pain     Sputum    Joint Pain     SOB/AMADOR    Pruritis/Rash     Nausea/vomit    Tolerating PT/OT     Diarrhea    Tolerating Diet     Constipation    Other       Could not obtain due to:      Objective:     VITALS:   Last 24hrs VS reviewed since prior progress note.  Most recent are:  Visit Vitals  BP (!) 125/52 (BP 1 Location: Left upper arm)   Pulse 63   Temp 98.4 °F (36.9 °C)   Resp 18   Ht 5' 4\" (1.626 m)   Wt 60.6 kg (133 lb 8 oz)   SpO2 100%   Breastfeeding No   BMI 22.92 kg/m²       Intake/Output Summary (Last 24 hours) at 3/12/2023 1555  Last data filed at 3/12/2023 0030  Gross per 24 hour   Intake --   Output 500 ml   Net -500 ml        Telemetry Reviewed:     PHYSICAL EXAM:  General: NAD  + edema- protective pads over both heel      Lab Data Reviewed: (see below)    Medications Reviewed: (see below)    PMH/SH reviewed - no change compared to H&P  ________________________________________________________________________  Care Plan discussed with:  Patient     Family      RN     Care Manager                    Consultant:          Comments   >50% of visit spent in counseling and coordination of care       ________________________________________________________________________  Drew Norman MD     Procedures: see electronic medical records for all procedures/Xrays and details which  were not copied into this note but were reviewed prior to creation of Plan.       LABS:  Recent Labs     03/10/23  0330   WBC 13.6*   HGB 9.7*   HCT 30.7*          Recent Labs     03/12/23  0348 03/11/23  0216 03/10/23  0330    144 148*   K 4.9 4.3 4.4   * 117* 121*   CO2 21 22 23   BUN 53* 59* 74*   CREA 2.17* 2.05* 2.36*   * 82 98   CA 8.9 8.9 8.7             MEDICATIONS:  Current Facility-Administered Medications   Medication Dose Route Frequency    heparin (porcine) injection 5,000 Units  5,000 Units SubCUTAneous Q12H    piperacillin-tazobactam (ZOSYN) 3.375 g in 0.9% sodium chloride (MBP/ADV) 100 mL MBP  3.375 g IntraVENous Q12H    acetaminophen (TYLENOL) tablet 650 mg  650 mg Oral Q6H PRN    Or    acetaminophen (TYLENOL) suppository 650 mg  650 mg Rectal Q6H PRN    polyethylene glycol (MIRALAX) packet 17 g  17 g Oral DAILY PRN    promethazine (PHENERGAN) tablet 12.5 mg  12.5 mg Oral Q6H PRN    Or    ondansetron (ZOFRAN) injection 4 mg  4 mg IntraVENous Q6H PRN    metoprolol tartrate (LOPRESSOR) tablet 50 mg  50 mg Oral BID    [Held by provider] furosemide (LASIX) tablet 40 mg  40 mg Oral DAILY    ammonium lactate (LAC-HYDRIN) 12 % lotion   Topical BID    traMADoL (ULTRAM) tablet 50 mg  50 mg Oral Q6H PRN    amLODIPine (NORVASC) tablet 5 mg  5 mg Oral DAILY    morphine injection 2 mg  2 mg IntraVENous Q6H PRN

## 2023-03-12 NOTE — PROGRESS NOTES
End of Shift Note    Bedside shift change report given to [address] [place] [address] (oncoming nurse) by Evy Varela RN (offgoing nurse). Report included the following information SBAR, Kardex, Intake/Output, Recent Results, and Cardiac Rhythm Sinus rhythm    Shift worked:  7p-7a   Shift summary and any significant changes:     Scheduled meds given plus PRN Tramadol. Pt tolerated care. Pt was able to urinate during my shift and after palpated abdomin was soft and no longer tender. Chg bath given. Blood drawn.     Concerns for physician to address:       Zone phone for oncoming shift:          Activity:  Activity Level: Bed Rest  Number times ambulated in hallways past shift: 0  Number of times OOB to chair past shift: 0    Cardiac:   Cardiac Monitoring: Yes      Cardiac Rhythm: Sinus Rhythm    Access:  Current line(s): PIV     Genitourinary:   Urinary status: voiding, incontinent, and external catheter    Respiratory:   O2 Device: None (Room air)  Chronic home O2 use?: NO  Incentive spirometer at bedside: NO       GI:  Last Bowel Movement Date: 03/11/23  Current diet:  ADULT DIET Regular  ADULT ORAL NUTRITION SUPPLEMENT Breakfast, Dinner; Renal Supplement  Passing flatus: YES  Tolerating current diet: YES       Pain Management:   Patient states pain is manageable on current regimen: YES    Skin:  Kvng Score: 12  Interventions: increase time out of bed and PT/OT consult    Patient Safety:  Fall Score:    Interventions: gripper socks and pt to call before getting OOB       Length of Stay:  Expected LOS: 2d 4h  Actual LOS: 3      Evy Varela RN

## 2023-03-12 NOTE — PROGRESS NOTES
Hospitalist Progress Note    Subjective:   Daily Progress Note: 3/12/2023 10:16 AM    Hospital Course:Ms. Sundar Braxton is a 80year old female who presented to the emergency room status post fall to left knee. Patient reports she was getting ready to drive to [facility] for a wound care appointment. She was outside standing on her steps when her left knee gave out and she slipped to the ground. No significant injury. She normally ambulates with a walker. She has chronic stasis wounds/ulcerations of both lower legs. On chart review she was first seen in the [facility] wound care clinic on February 15, 2023. She has bilateral lower leg edema and some small scattered ulcers. They have been wrapping her legs from base of the toes to her upper calf as of late. With 2 layer compression wraps. Past medical history significant for CKD, high cholesterol, and HTN    Subjective: Patient observed resting in bed with eyes opened. Alert x 3. Denies complaints. No acute distress observed.     Current Facility-Administered Medications   Medication Dose Route Frequency    heparin (porcine) injection 5,000 Units  5,000 Units SubCUTAneous Q12H    piperacillin-tazobactam (ZOSYN) 3.375 g in 0.9% sodium chloride (MBP/ADV) 100 mL MBP  3.375 g IntraVENous Q12H    acetaminophen (TYLENOL) tablet 650 mg  650 mg Oral Q6H PRN    Or    acetaminophen (TYLENOL) suppository 650 mg  650 mg Rectal Q6H PRN    polyethylene glycol (MIRALAX) packet 17 g  17 g Oral DAILY PRN    promethazine (PHENERGAN) tablet 12.5 mg  12.5 mg Oral Q6H PRN    Or    ondansetron (ZOFRAN) injection 4 mg  4 mg IntraVENous Q6H PRN    metoprolol tartrate (LOPRESSOR) tablet 50 mg  50 mg Oral BID    [Held by provider] furosemide (LASIX) tablet 40 mg  40 mg Oral DAILY    ammonium lactate (LAC-HYDRIN) 12 % lotion   Topical BID    traMADoL (ULTRAM) tablet 50 mg  50 mg Oral Q6H PRN    amLODIPine (NORVASC) tablet 5 mg  5 mg Oral DAILY    morphine injection 2 mg  2 mg IntraVENous Q6H PRN        Review of Systems:  Review of Systems:  Constitutional:  Negative for activity change, chills and fever. HENT:  Negative for congestion and sore throat. Eyes:  Negative for pain and redness. Respiratory:  Negative for cough, chest tightness and shortness of breath. Cardiovascular:  Negative for chest pain and palpitations. Gastrointestinal:  Negative for abdominal pain, diarrhea, nausea and vomiting. Genitourinary:  Negative for dysuria, frequency and urgency. Musculoskeletal:  Negative for back pain and neck pain. Skin:  Positive for bilateral leg wounds. Negative for rash. Neurological:  Negative for syncope, light-headedness and headaches. Psychiatric/Behavioral:  Negative for confusion. All other systems reviewed and are negative. Objective:     Visit Vitals  BP (!) 117/51 (BP 1 Location: Left upper arm, BP Patient Position: Supine)   Pulse 72   Temp 98.8 °F (37.1 °C)   Resp 18   Ht 5' 4\" (1.626 m)   Wt 60.6 kg (133 lb 8 oz)   SpO2 100%   Breastfeeding No   BMI 22.92 kg/m²      O2 Device: None (Room air)    Temp (24hrs), Av.6 °F (37 °C), Min:98.4 °F (36.9 °C), Max:98.8 °F (37.1 °C)      No intake/output data recorded. 03/10 1901 -  0700  In: -   Out: 899 [Urine:899]    PHYSICAL EXAM:  Physical Exam  HENT:      Head: Normocephalic and atraumatic. Right Ear: External ear normal.      Left Ear: External ear normal.      Nose: Nose normal.      Mouth/Throat:      Pharynx: Oropharynx is clear. Eyes:      Conjunctiva/sclera: Conjunctivae normal.   Cardiovascular: telemetry: sinus rhythm     Rate and Rhythm: Normal rate and regular rhythm. Pulses: Normal pulses. Heart sounds: Murmur heard. Pulmonary:      Effort: Pulmonary effort is normal.      Breath sounds: Normal breath sounds. Abdominal: Last bowel movement 3/11/23     General: Bowel sounds are normal.   Musculoskeletal:         General: Normal range of motion. Cervical back: Normal range of motion. Skin:Gluteal fissure noted. Right buttock skin tear. General: Skin is warm and dry. Capillary Refill: Capillary refill takes 2 to 3 seconds. Comments: Compression dressings to bilateral lower extremities intact. Motion and sensation present to toes and legs. Neurological:      Mental Status: She is alert. Comments: Oriented to person and place. Psychiatric:         Mood and Affect: Mood normal.   Data Review    Recent Results (from the past 24 hour(s))   GLUCOSE, POC    Collection Time: 03/11/23 12:28 PM   Result Value Ref Range    Glucose (POC) 124 (H) 65 - 117 mg/dL    Performed by Annika Pereira PCT    GLUCOSE, POC    Collection Time: 03/11/23  5:19 PM   Result Value Ref Range    Glucose (POC) 112 65 - 117 mg/dL    Performed by Annika Pereira PCT    GLUCOSE, POC    Collection Time: 03/11/23  8:43 PM   Result Value Ref Range    Glucose (POC) 187 (H) 65 - 117 mg/dL    Performed by Carlito Prezi (PCT)    HEPATIC FUNCTION PANEL    Collection Time: 03/12/23  3:48 AM   Result Value Ref Range    Protein, total 6.9 6.4 - 8.2 g/dL    Albumin 1.9 (L) 3.5 - 5.0 g/dL    Globulin 5.0 (H) 2.0 - 4.0 g/dL    A-G Ratio 0.4 (L) 1.1 - 2.2      Bilirubin, total 1.0 0.2 - 1.0 MG/DL    Bilirubin, direct 0.3 (H) 0.0 - 0.2 MG/DL    Alk. phosphatase 126 (H) 45 - 117 U/L    AST (SGOT) 15 15 - 37 U/L    ALT (SGPT) 18 12 - 78 U/L   METABOLIC PANEL, BASIC    Collection Time: 03/12/23  3:48 AM   Result Value Ref Range    Sodium 144 136 - 145 mmol/L    Potassium 4.9 3.5 - 5.1 mmol/L    Chloride 117 (H) 97 - 108 mmol/L    CO2 21 21 - 32 mmol/L    Anion gap 6 5 - 15 mmol/L    Glucose 114 (H) 65 - 100 mg/dL    BUN 53 (H) 6 - 20 MG/DL    Creatinine 2.17 (H) 0.55 - 1.02 MG/DL    BUN/Creatinine ratio 24 (H) 12 - 20      eGFR 21 (L) >60 ml/min/1.73m2    Calcium 8.9 8.5 - 10.1 MG/DL       CT HEAD WO CONT   Final Result   1. No acute intracranial abnormality.    2.  Left maxillary sinus mucosal thickening and fluid, may reflect acute   sinusitis. XR WRIST LT AP/LAT   Final Result   Diffuse soft tissue swelling without acute osseous abnormality. US RETROPERITONEUM COMP   Final Result   1. No hydronephrosis. Atrophic right kidney. 2.  Large calcified uterine fibroid. Active Problems:    AGATA (acute kidney injury) ([facility] [address] (3/9/2023)        Assessment/Plan:   AGATA       Hypernatremia - resolved  - etiology likely secondary to dehydration  - creatinine at baseline per nephrology  - us retroperitoneum shows no hydronephrosis, atrophic right kidney, large calcified uterine fibroid  - gomez catheter placed 3/11/23 for urinary retention, voiding trial in 3-4 days. - strict I&O  - continue to hold lasix  - will monitor lab work  - nephrology following        2. Bilateral lower extremity wounds      Leukocytosis   - compression dressings noted   - wound care following  -  preliminary blood cultures show no growth at 2 days  -  dmitri and wound cultures pending  -  continue antibiotic coverage   - vascular to follow     3. Hyperkalemia - resolved     4. Acute metabolic encephalopathy- improving  - etiology possibly related to bilateral lower extremity wounds  - CT of head shows: mild generalized volume loss. Extensive while matter hypo densities may reflect chronic microangiopathic change. No acute intracranial abnormality. Left maxillary sinus mucosal thickening and fluid, may reflect acute sinusitis. - urinalysis negative for nitrites, leukocytes and bacteria  - monitor for changes in mentation  - continue antibiotic coverage     5. Fall - PTA   - left wrist and forearm discomfort  - xray shows diffuse soft tissue swelling without acute osseous abnormality  - fall precautions    6. Impaired skin integrity  - continue wound care orders        PT/OT recommend SNF at discharge     Discharge disposition: pending hospital course. Nephrology following.  CM to follow for discharge planning.      DVT Prophylaxis: Heparin  Code Status:  Full Code  POA: Lakisha Swartz, sister [phone]     Care Plan discussed with: patient, nursing, CM      ______________________________________    Joanna Valdez NP

## 2023-03-13 ENCOUNTER — APPOINTMENT (OUTPATIENT)
Dept: VASCULAR SURGERY | Age: 88
DRG: 682 | End: 2023-03-13
Attending: NURSE PRACTITIONER
Payer: MEDICARE

## 2023-03-13 LAB
ALBUMIN SERPL-MCNC: 1.9 G/DL (ref 3.5–5)
ALBUMIN/GLOB SERPL: 0.4 (ref 1.1–2.2)
ALP SERPL-CCNC: 140 U/L (ref 45–117)
ALT SERPL-CCNC: 18 U/L (ref 12–78)
AST SERPL-CCNC: 15 U/L (ref 15–37)
BASOPHILS # BLD: 0.1 K/UL (ref 0–0.1)
BASOPHILS NFR BLD: 1 % (ref 0–1)
BILIRUB DIRECT SERPL-MCNC: 0.3 MG/DL (ref 0–0.2)
BILIRUB SERPL-MCNC: 0.6 MG/DL (ref 0.2–1)
DIFFERENTIAL METHOD BLD: ABNORMAL
EOSINOPHIL # BLD: 0.4 K/UL (ref 0–0.4)
EOSINOPHIL NFR BLD: 4 % (ref 0–7)
ERYTHROCYTE [DISTWIDTH] IN BLOOD BY AUTOMATED COUNT: 18.6 % (ref 11.5–14.5)
FERRITIN SERPL-MCNC: 244 NG/ML (ref 8–252)
GLOBULIN SER CALC-MCNC: 5.1 G/DL (ref 2–4)
GLUCOSE BLD STRIP.AUTO-MCNC: 136 MG/DL (ref 65–117)
HCT VFR BLD AUTO: 30.1 % (ref 35–47)
HGB BLD-MCNC: 9.4 G/DL (ref 11.5–16)
IMM GRANULOCYTES # BLD AUTO: 0.1 K/UL (ref 0–0.04)
IMM GRANULOCYTES NFR BLD AUTO: 1 % (ref 0–0.5)
IRON SATN MFR SERPL: 29 % (ref 20–50)
IRON SERPL-MCNC: 42 UG/DL (ref 35–150)
LEFT ABI: 1.35
LEFT ARM BP: 130 MMHG
LEFT TBI: 0.92
LEFT TOE PRESSURE: 119 MMHG
LYMPHOCYTES # BLD: 1.2 K/UL (ref 0.8–3.5)
LYMPHOCYTES NFR BLD: 10 % (ref 12–49)
MCH RBC QN AUTO: 30.7 PG (ref 26–34)
MCHC RBC AUTO-ENTMCNC: 31.2 G/DL (ref 30–36.5)
MCV RBC AUTO: 98.4 FL (ref 80–99)
MONOCYTES # BLD: 1 K/UL (ref 0–1)
MONOCYTES NFR BLD: 9 % (ref 5–13)
NEUTS SEG # BLD: 8.9 K/UL (ref 1.8–8)
NEUTS SEG NFR BLD: 75 % (ref 32–75)
NRBC # BLD: 0 K/UL (ref 0–0.01)
NRBC BLD-RTO: 0 PER 100 WBC
PLATELET # BLD AUTO: 249 K/UL (ref 150–400)
PMV BLD AUTO: 11.1 FL (ref 8.9–12.9)
PROT SERPL-MCNC: 7 G/DL (ref 6.4–8.2)
RBC # BLD AUTO: 3.06 M/UL (ref 3.8–5.2)
RIGHT ABI: 1.42
RIGHT ARM BP: 123 MMHG
RIGHT TBI: 0.76
RIGHT TOE PRESSURE: 99 MMHG
SERVICE CMNT-IMP: ABNORMAL
TIBC SERPL-MCNC: 146 UG/DL (ref 250–450)
VAS LEFT DORSALIS PEDIS BP: 176 MMHG
VAS RIGHT DORSALIS PEDIS BP: 184 MMHG
WBC # BLD AUTO: 11.7 K/UL (ref 3.6–11)

## 2023-03-13 PROCEDURE — 74011250636 HC RX REV CODE- 250/636: Performed by: NURSE PRACTITIONER

## 2023-03-13 PROCEDURE — 74011250637 HC RX REV CODE- 250/637: Performed by: INTERNAL MEDICINE

## 2023-03-13 PROCEDURE — 74011000258 HC RX REV CODE- 258: Performed by: NURSE PRACTITIONER

## 2023-03-13 PROCEDURE — 85025 COMPLETE CBC W/AUTO DIFF WBC: CPT

## 2023-03-13 PROCEDURE — 97530 THERAPEUTIC ACTIVITIES: CPT

## 2023-03-13 PROCEDURE — 80076 HEPATIC FUNCTION PANEL: CPT

## 2023-03-13 PROCEDURE — 65270000046 HC RM TELEMETRY

## 2023-03-13 PROCEDURE — 97535 SELF CARE MNGMENT TRAINING: CPT

## 2023-03-13 PROCEDURE — 36415 COLL VENOUS BLD VENIPUNCTURE: CPT

## 2023-03-13 PROCEDURE — 74011250637 HC RX REV CODE- 250/637: Performed by: STUDENT IN AN ORGANIZED HEALTH CARE EDUCATION/TRAINING PROGRAM

## 2023-03-13 PROCEDURE — 97116 GAIT TRAINING THERAPY: CPT

## 2023-03-13 PROCEDURE — 82962 GLUCOSE BLOOD TEST: CPT

## 2023-03-13 PROCEDURE — 93922 UPR/L XTREMITY ART 2 LEVELS: CPT

## 2023-03-13 PROCEDURE — 82728 ASSAY OF FERRITIN: CPT

## 2023-03-13 PROCEDURE — 74011250636 HC RX REV CODE- 250/636: Performed by: INTERNAL MEDICINE

## 2023-03-13 PROCEDURE — 83540 ASSAY OF IRON: CPT

## 2023-03-13 RX ORDER — AMLODIPINE BESYLATE 2.5 MG/1
2.5 TABLET ORAL DAILY
Status: DISCONTINUED | OUTPATIENT
Start: 2023-03-13 | End: 2023-03-15 | Stop reason: HOSPADM

## 2023-03-13 RX ADMIN — HEPARIN SODIUM 5000 UNITS: 5000 INJECTION INTRAVENOUS; SUBCUTANEOUS at 05:48

## 2023-03-13 RX ADMIN — Medication: at 20:43

## 2023-03-13 RX ADMIN — METOPROLOL TARTRATE 50 MG: 50 TABLET ORAL at 09:48

## 2023-03-13 RX ADMIN — TRAMADOL HYDROCHLORIDE 50 MG: 50 TABLET ORAL at 15:34

## 2023-03-13 RX ADMIN — PIPERACILLIN AND TAZOBACTAM 3.38 G: 3; .375 INJECTION, POWDER, LYOPHILIZED, FOR SOLUTION INTRAVENOUS at 09:48

## 2023-03-13 RX ADMIN — TRAMADOL HYDROCHLORIDE 50 MG: 50 TABLET ORAL at 20:45

## 2023-03-13 RX ADMIN — HEPARIN SODIUM 5000 UNITS: 5000 INJECTION INTRAVENOUS; SUBCUTANEOUS at 18:55

## 2023-03-13 RX ADMIN — MORPHINE SULFATE 2 MG: 2 INJECTION, SOLUTION INTRAMUSCULAR; INTRAVENOUS at 17:49

## 2023-03-13 RX ADMIN — METOPROLOL TARTRATE 50 MG: 50 TABLET ORAL at 18:55

## 2023-03-13 RX ADMIN — PIPERACILLIN AND TAZOBACTAM 3.38 G: 3; .375 INJECTION, POWDER, LYOPHILIZED, FOR SOLUTION INTRAVENOUS at 23:40

## 2023-03-13 RX ADMIN — AMLODIPINE BESYLATE 2.5 MG: 2.5 TABLET ORAL at 09:48

## 2023-03-13 RX ADMIN — Medication: at 09:49

## 2023-03-13 NOTE — PROGRESS NOTES
Problem: Mobility Impaired (Adult and Pediatric)  Goal: *Acute Goals and Plan of Care (Insert Text)  Description: FUNCTIONAL STATUS PRIOR TO ADMISSION: Pt not the best historian, but reports that she uses her rollator and cane for ambulation. HOME SUPPORT PRIOR TO ADMISSION: The patient lived alone with youngest sister to provide assistance. Physical Therapy Goals  Initiated 3/9/2023  1. Patient will move from supine to sit and sit to supine , scoot up and down, and roll side to side in bed with minimal assistance/contact guard assist within 7 day(s). 2.  Patient will transfer from bed to chair and chair to bed with minimal assistance/contact guard assist using the least restrictive device within 7 day(s). 3.  Patient will perform sit to stand with minimal assistance/contact guard assist within 7 day(s). 4.  Patient will ambulate with contact guard assist for 50 feet with the least restrictive device within 7 day(s). Outcome: Progressing Towards Goal   PHYSICAL THERAPY TREATMENT  Patient: Amy No (75 y.o. female)  Date: 3/13/2023  Diagnosis: AGATA (acute kidney injury) ([facility] [address] [N17.9] <principal problem not specified>      Precautions: Bed Alarm, Fall, Skin, WBAT (fell 3-8 with L knee/wrist P!; SEVERE B LE wounds, cate/sacral wounds)  Chart, physical therapy assessment, plan of care and goals were reviewed. ASSESSMENT  Patient continues with skilled PT services and is progressing towards goals. Pt showing very good improvement since last session. She is now at a min A of 2 level of assist for transfers and short amb with the rolling walker. She was able to move to the [facility] and amb from the [facility] to chair. She stood for an extended period of time for hygiene performed by RN after toileting with guarding and support of rolling walker for balance. Much improved pain in LEs. No c/o LUE as in previous session. Pt able to tolerate AROM seated in chair: hip flexion, knee ext and min ankle pumps x10.  She states her knees bother her and the L is worse than the right. Pt below her baseline and showing good potential to progress with therapy. Recommend SNF rehab at d/c. Current Level of Function Impacting Discharge (mobility/balance): see above    Other factors to consider for discharge: increased fall risk; improving but still present pain BLE. PLAN :  Patient continues to benefit from skilled intervention to address the above impairments. Continue treatment per established plan of care. to address goals. Recommendation for discharge: (in order for the patient to meet his/her long term goals)  Therapy up to 5 days/week in SNF setting    This discharge recommendation:  Has been made in collaboration with the attending provider and/or case management    IF patient discharges home will need the following DME: rolling walker       SUBJECTIVE:   Patient stated I have to go to the bathroom.     OBJECTIVE DATA SUMMARY:   Critical Behavior:  Neurologic State: Alert  Orientation Level: Oriented to person, Oriented to time, Disoriented to situation, Disoriented to place  Cognition: Follows commands  Safety/Judgement: Fall prevention, Awareness of environment, Insight into deficits, Decreased insight into deficits  Functional Mobility Training:  Bed Mobility:     Supine to Sit: Minimum assistance     Scooting: Contact guard assistance        Transfers:  Sit to Stand: Minimum assistance;Assist x2  Stand to Sit: Minimum assistance        Bed to Chair: Minimum assistance;Assist x1 (with rolling walker)                    Balance:  Sitting: Intact  Standing: Impaired  Standing - Static: Fair;Constant support; Other (comment) (with rolling walker)  Standing - Dynamic : Fair;Constant support; Other (comment) (with rolling walker)  Ambulation/Gait Training:  Distance (ft): 6 Feet (ft)  Assistive Device: Gait belt;Walker, rolling  Ambulation - Level of Assistance: Minimal assistance;Assist x2        Gait Abnormalities: Decreased step clearance        Base of Support: Widened     Speed/Vickie: Slow;Pace decreased (<100 feet/min)  Step Length: Right shortened;Left shortened             Activity Tolerance:   Fair    After treatment patient left in no apparent distress:   Sitting in chair, Heels elevated for pressure relief, Call bell within reach, and Bed / chair alarm activated    COMMUNICATION/COLLABORATION:   The patients plan of care was discussed with: Registered nurse and Case management.      Elia Chester, PT   Time Calculation: 25 mins

## 2023-03-13 NOTE — PROGRESS NOTES
CM reviewed referral that was sent to snf placement: [facility] on 3/10/23. Pt was accepted for placement, and will require insurance auth. CM spoke with admission coordinator will initiate auth on: 3/13/23. INSURANCE AUTH CAN TAKE 24-48HRS TO APPROVE. CM spoke with pts sister: Gisel Dangleo, via telephone to provide her with updates regarding snf placement. Gisel Dangelo agreeable to the following. Gisel Dangelo reported that she will visit pt on 3/14/23 at [id number] [facility]. CM will continue to follow.     SAMAN Felix, [address] [place] [address]

## 2023-03-13 NOTE — PROGRESS NOTES
Bedside shift report given to oncoming nurse Constance Armas RN by off going nurse Sara Martin RN patient alert and stable during shift change no distress noted

## 2023-03-13 NOTE — PROGRESS NOTES
Hospitalist Progress Note    Subjective:   Daily Progress Note: 3/13/2023 6:46 PM    Hospital Course:  Ms. Sundar Braxton is a 80year old female who presented to the emergency room status post fall to left knee. Patient reports she was getting ready to drive to [place] for a wound care appointment. She was outside standing on her steps when her left knee gave out and she slipped to the ground. No significant injury. She normally ambulates with a walker. She has chronic stasis wounds/ulcerations of both lower legs. On chart review she was first seen in the [facility] wound care clinic on February 15, 2023. She has bilateral lower leg edema and some small scattered ulcers. Subjective: PT seen in room, working with PT, sitting in chair    Current Facility-Administered Medications   Medication Dose Route Frequency    amLODIPine (NORVASC) tablet 2.5 mg  2.5 mg Oral DAILY    heparin (porcine) injection 5,000 Units  5,000 Units SubCUTAneous Q12H    piperacillin-tazobactam (ZOSYN) 3.375 g in 0.9% sodium chloride (MBP/ADV) 100 mL MBP  3.375 g IntraVENous Q12H    acetaminophen (TYLENOL) tablet 650 mg  650 mg Oral Q6H PRN    Or    acetaminophen (TYLENOL) suppository 650 mg  650 mg Rectal Q6H PRN    polyethylene glycol (MIRALAX) packet 17 g  17 g Oral DAILY PRN    promethazine (PHENERGAN) tablet 12.5 mg  12.5 mg Oral Q6H PRN    Or    ondansetron (ZOFRAN) injection 4 mg  4 mg IntraVENous Q6H PRN    metoprolol tartrate (LOPRESSOR) tablet 50 mg  50 mg Oral BID    [Held by provider] furosemide (LASIX) tablet 40 mg  40 mg Oral DAILY    ammonium lactate (LAC-HYDRIN) 12 % lotion   Topical BID    traMADoL (ULTRAM) tablet 50 mg  50 mg Oral Q6H PRN    morphine injection 2 mg  2 mg IntraVENous Q6H PRN        Review of Systems:    Review of Systems   Constitutional:  Negative for fever and weight loss. Cardiovascular:  Negative for chest pain and orthopnea.    Gastrointestinal:  Negative for heartburn, nausea and vomiting. Genitourinary:  Negative for dysuria and urgency. Neurological:  Negative for dizziness and headaches. Objective:     Visit Vitals  BP (!) 131/54   Pulse 66   Temp 98.2 °F (36.8 °C)   Resp 22   Ht 5' 4\" (1.626 m)   Wt 60.7 kg (133 lb 14.4 oz)   SpO2 98%   Breastfeeding No   BMI 22.98 kg/m²      O2 Device: None (Room air)    Temp (24hrs), Av.4 °F (36.9 °C), Min:98.1 °F (36.7 °C), Max:99.1 °F (37.3 °C)      No intake/output data recorded.  1901 -  0700  In: 510 [P.O.:360; I.V.:150]  Out: 1050 [Urine:1050]    PHYSICAL EXAM:    Physical Exam  Constitutional:       General: She is not in acute distress. Cardiovascular:      Rate and Rhythm: Normal rate and regular rhythm. Pulses: Normal pulses. Heart sounds: Normal heart sounds. Pulmonary:      Breath sounds: Normal breath sounds. Abdominal:      General: Bowel sounds are normal.      Palpations: Abdomen is soft. Musculoskeletal:         General: Normal range of motion. Skin:     Comments: Bilateral lower leg edema with ulcers, blackened skin, flaky, dry   Neurological:      Mental Status: She is oriented to person, place, and time. Data Review    Recent Results (from the past 24 hour(s))   GLUCOSE, POC    Collection Time: 23  9:29 PM   Result Value Ref Range    Glucose (POC) 210 (H) 65 - 117 mg/dL    Performed by Liam Lafleur (PCT)    HEPATIC FUNCTION PANEL    Collection Time: 23  3:32 AM   Result Value Ref Range    Protein, total 7.0 6.4 - 8.2 g/dL    Albumin 1.9 (L) 3.5 - 5.0 g/dL    Globulin 5.1 (H) 2.0 - 4.0 g/dL    A-G Ratio 0.4 (L) 1.1 - 2.2      Bilirubin, total 0.6 0.2 - 1.0 MG/DL    Bilirubin, direct 0.3 (H) 0.0 - 0.2 MG/DL    Alk.  phosphatase 140 (H) 45 - 117 U/L    AST (SGOT) 15 15 - 37 U/L    ALT (SGPT) 18 12 - 78 U/L   CBC WITH AUTOMATED DIFF    Collection Time: 23  3:32 AM   Result Value Ref Range    WBC 11.7 (H) 3.6 - 11.0 K/uL    RBC 3.06 (L) 3.80 - 5.20 M/uL HGB 9.4 (L) 11.5 - 16.0 g/dL    HCT 30.1 (L) 35.0 - 47.0 %    MCV 98.4 80.0 - 99.0 FL    MCH 30.7 26.0 - 34.0 PG    MCHC 31.2 30.0 - 36.5 g/dL    RDW 18.6 (H) 11.5 - 14.5 %    PLATELET 549 888 - 158 K/uL    MPV 11.1 8.9 - 12.9 FL    NRBC 0.0 0  WBC    ABSOLUTE NRBC 0.00 0.00 - 0.01 K/uL    NEUTROPHILS 75 32 - 75 %    LYMPHOCYTES 10 (L) 12 - 49 %    MONOCYTES 9 5 - 13 %    EOSINOPHILS 4 0 - 7 %    BASOPHILS 1 0 - 1 %    IMMATURE GRANULOCYTES 1 (H) 0.0 - 0.5 %    ABS. NEUTROPHILS 8.9 (H) 1.8 - 8.0 K/UL    ABS. LYMPHOCYTES 1.2 0.8 - 3.5 K/UL    ABS. MONOCYTES 1.0 0.0 - 1.0 K/UL    ABS. EOSINOPHILS 0.4 0.0 - 0.4 K/UL    ABS. BASOPHILS 0.1 0.0 - 0.1 K/UL    ABS. IMM. GRANS. 0.1 (H) 0.00 - 0.04 K/UL    DF AUTOMATED     FERRITIN    Collection Time: 03/13/23  3:32 AM   Result Value Ref Range    Ferritin 244 8 - 252 NG/ML   IRON PROFILE    Collection Time: 03/13/23  3:32 AM   Result Value Ref Range    Iron 42 35 - 150 ug/dL    TIBC 146 (L) 250 - 450 ug/dL    Iron % saturation 29 20 - 50 %   GLUCOSE, POC    Collection Time: 03/13/23  8:41 AM   Result Value Ref Range    Glucose (POC) 136 (H) 65 - 117 mg/dL    Performed by Marta Ferraro PCT    ANKLE BRACHIAL INDEX    Collection Time: 03/13/23 11:23 AM   Result Value Ref Range    Left arm  mmHg    Left dorsalis pedis  mmHg    Left toe pressure 119 mmHg    Right arm  mmHg    Right dorsalis pedis  mmHg    Right toe pressure 99 mmHg    Left SABA 1.35     Right SABA 1.42     Left TBI 0.92     Right TBI 0.76        ANKLE BRACHIAL INDEX   Final Result      CT HEAD WO CONT   Final Result   1. No acute intracranial abnormality. 2.  Left maxillary sinus mucosal thickening and fluid, may reflect acute   sinusitis. XR WRIST LT AP/LAT   Final Result   Diffuse soft tissue swelling without acute osseous abnormality. US RETROPERITONEUM COMP   Final Result   1. No hydronephrosis. Atrophic right kidney. 2.  Large calcified uterine fibroid. Active Problems:    AGATA (acute kidney injury) ([facility] [address] (3/9/2023)        Assessment/Plan:   Bilateral leg wounds- wound care per orders. BC negative so far, on IV zosyn. 2. Hypertension- on norvasc, metoprolol    3. Generalized weakness- PT/OT, OOB to chair. 4. Discharge- SNF recommended, CM following , waiting authorization. 5. CKD stage 4- creatinine stable, nephrology following, hold lasix.       DVT Prophylaxis: heparin sq  Code Status:  Full Code  POA: NOK sister Elvira Huffman [phone] discussed with:   _______patient, staff nurse, JACKSON________________________________________________________    Ned Rivera NP

## 2023-03-13 NOTE — PROGRESS NOTES
Problem: Self Care Deficits Care Plan (Adult)  Goal: *Acute Goals and Plan of Care (Insert Text)  Description: FUNCTIONAL STATUS PRIOR TO ADMISSION:  She endorses use of cane and rollator for functional mobility. Note patient admitted with extensive wounds suggesting difficulty caring for herself recently. 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 \" (For spouse or patient?) Patient indicates she is not sure when wounds became so bad, but reports she was focused on spouse and felt she ignored the wounds due to her caregiver role. (This patient is 80 yrs old and looks FAR younger.)    HOME SUPPORT: Patient lives alone. Her sister provides assistance with groceries and transportation, checking in occasionally. Per sister, when being picked up for medical appointments, she is usually alert, oriented, and fully dressed upon sister's arrival to the house. Consistently goes to wound care appointments    Occupational Therapy Goals  Initiated 3/9/2023  1. Patient will perform seated grooming with standby assistance within 7 day(s). 2.  Patient will perform self-feeding with standby assistance within 7 day(s). 3.  Patient will perform lower body dressing with moderate assistance within 7 day(s). 4.  Patient will perform toilet transfers to [facility] with standby assistance using RW prn within 7 day(s). 5.  Patient will perform all aspects of toileting with moderate assistance within 7 day(s). 6.  Patient will participate in cog screening to aide d/c planning within 7 days  7. Patient will participate in B UE AROM HEP training (clarify L wrist p! Cause) with S within 7 days  8. Patient will demonstrate understanding fall prevention, energy conservation, pain management and pressure relief w/verbal cues within 7 days    Outcome: Progressing Towards Goal    OCCUPATIONAL THERAPY TREATMENT  Patient: Raymon Torres (89 y.o. female)  Date: 3/13/2023  Diagnosis: AGATA (acute kidney injury) ([facility] [address] [N17.9] <principal problem not specified>      Precautions: Bed Alarm, Fall, Skin, WBAT (fell 3-8 with L knee/wrist P!; SEVERE B LE wounds, cate/sacral wounds)  Chart, occupational therapy assessment, plan of care, and goals were reviewed. ASSESSMENT  Patient continues with skilled OT services and is slowly progressing towards goals. Pt noted to perseverate on lines/leads, requiring Max verbal cues for task attentiveness and pt requiring overall Moderate A for thoroughness of oral/facial hygiene. Pt noted with good hand to mouth, with reporting therapist believing pt's decreased problem solving/sequencing was her greatest limiting factor this date. Continue skilled OT to address functional deficits during acute hospitalization, with SNF recommended at discharge. Current Level of Function Impacting Discharge (ADLs): Up to Max A for ADL completion    Other factors to consider for discharge: decreased problem solving/sequencing/task attentiveness in PM          PLAN :  Patient continues to benefit from skilled intervention to address the above impairments. Continue treatment per established plan of care to address goals. Recommend with staff: OOB meals, Active ADL engagement    Recommend next OT session: POC progression    Recommendation for discharge: (in order for the patient to meet his/her long term goals)  Therapy up to 5 days/week in SNF setting    This discharge recommendation:  Has been made in collaboration with the attending provider and/or case management    IF patient discharges home will need the following DME: TBD       SUBJECTIVE:   Patient stated I got all these things tying me down.   re: lines/leads    OBJECTIVE DATA SUMMARY:   Cognitive/Behavioral Status:  Neurologic State: Alert  Orientation Level: Oriented to person;Oriented to time;Disoriented to situation;Disoriented to place  Cognition: Follows commands  Perseveration: Perseverates during mobility;Perseverates during conversation;Perseverates during ADLS (on lines/leads)  Safety/Judgement: Decreased awareness of need for assistance;Decreased awareness of need for safety;Decreased insight into deficits    ADL Intervention:  Grooming  Grooming Assistance: Moderate assistance  Brushing Teeth: Moderate assistance (good hand to mouth; cognition limited task completion)    Cognitive Retraining  Safety/Judgement: Decreased awareness of need for assistance;Decreased awareness of need for safety;Decreased insight into deficits    Pain:  Reported lower back pain, did not quantify/did not want assist in repositioning; RN aware and following    Activity Tolerance:   Fair and requires rest breaks    After treatment patient left in no apparent distress:   Supine in bed, Call bell within reach, Bed / chair alarm activated, and Side rails x 3    COMMUNICATION/COLLABORATION:   The patients plan of care was discussed with: Registered nurse.      Sanchez Merida OT  Time Calculation: 11 mins

## 2023-03-13 NOTE — PROGRESS NOTES
End of Shift Note    Bedside shift change report given to Krystal Keenan (oncoming nurse) by Edin Lambert RN (offgoing nurse). Report included the following information SBAR, Kardex, and MAR    Shift worked:  7am-7pm     Shift summary and any significant changes:     Pt tolerated care fairly well. Medications were given and education was provided. Hourly rounding completed. Pt made no complaints of pain during this shift. Pt up x2 with the walker/gait belt to the chair/BSC. Pt Duplex completed. Family present at bedside. Incontinence care completed; pt had a massive BM during this shift. Pt set up for meals. Wound care completed and pt pre-medicated prior to wound care with Tramadol x1 and Morphine x1 post-wound care.             Edin Lambert RN

## 2023-03-13 NOTE — PROGRESS NOTES
NAME: Trevor Alonso        :  1933        MRN:  [id number]                 Assessment   :                                               Plan:  AGATA on CKD-4    Hyperkalemia    Edema  HTN  Anemia    Creatinine peaked at 3.8, now 2 after IVF's. Continue to hold diuretic. Creatinine 2.0 in 2022. Urine sediment bland. US shows no hydro and a small right kidney. NEMO pending. FLC ratio 2.7    Soft BP better, continue on decreased Amlodipine dose     K 5.6, give lokelma today    Hgb < 10, iron status adequate, give retacrit             Subjective:     Chief Complaint: Out of bed in chair. Reading the newspaper. Tolerating p.o. no acute c/o    Review of Systems:    Symptom Y/N Comments  Symptom Y/N Comments   Fever/Chills    Chest Pain     Poor Appetite    Edema     Cough    Abdominal Pain     Sputum    Joint Pain     SOB/AMADOR    Pruritis/Rash     Nausea/vomit    Tolerating PT/OT     Diarrhea    Tolerating Diet     Constipation    Other       Could not obtain due to:      Objective:     VITALS:   Last 24hrs VS reviewed since prior progress note.  Most recent are:  Visit Vitals  BP (!) 130/54 (BP 1 Location: Left upper arm, BP Patient Position: Supine)   Pulse 74   Temp 98.1 °F (36.7 °C)   Resp 14   Ht 5' 4\" (1.626 m)   Wt 60.6 kg (133 lb 8 oz)   SpO2 100%   Breastfeeding No   BMI 22.92 kg/m²       Intake/Output Summary (Last 24 hours) at 3/13/2023 0439  Last data filed at 3/12/2023 1956  Gross per 24 hour   Intake 510 ml   Output 550 ml   Net -40 ml        Telemetry Reviewed:     PHYSICAL EXAM:  General: NAD  improved edema- protective pads over both heel      Lab Data Reviewed: (see below)    Medications Reviewed: (see below)    PMH/SH reviewed - no change compared to H&P  ________________________________________________________________________  Care Plan discussed with:  Patient     Family      RN     Care Manager Consultant:          Comments   >50% of visit spent in counseling and coordination of care       ________________________________________________________________________  Veronica Hagan MD     Procedures: see electronic medical records for all procedures/Xrays and details which  were not copied into this note but were reviewed prior to creation of Plan.       LABS:  Recent Labs     03/13/23  0332   WBC 11.7*   HGB 9.4*   HCT 30.1*          Recent Labs     03/12/23  0348 03/11/23  0216    144   K 4.9 4.3   * 117*   CO2 21 22   BUN 53* 59*   CREA 2.17* 2.05*   * 82   CA 8.9 8.9             MEDICATIONS:  Current Facility-Administered Medications   Medication Dose Route Frequency    heparin (porcine) injection 5,000 Units  5,000 Units SubCUTAneous Q12H    piperacillin-tazobactam (ZOSYN) 3.375 g in 0.9% sodium chloride (MBP/ADV) 100 mL MBP  3.375 g IntraVENous Q12H    acetaminophen (TYLENOL) tablet 650 mg  650 mg Oral Q6H PRN    Or    acetaminophen (TYLENOL) suppository 650 mg  650 mg Rectal Q6H PRN    polyethylene glycol (MIRALAX) packet 17 g  17 g Oral DAILY PRN    promethazine (PHENERGAN) tablet 12.5 mg  12.5 mg Oral Q6H PRN    Or    ondansetron (ZOFRAN) injection 4 mg  4 mg IntraVENous Q6H PRN    metoprolol tartrate (LOPRESSOR) tablet 50 mg  50 mg Oral BID    [Held by provider] furosemide (LASIX) tablet 40 mg  40 mg Oral DAILY    ammonium lactate (LAC-HYDRIN) 12 % lotion   Topical BID    traMADoL (ULTRAM) tablet 50 mg  50 mg Oral Q6H PRN    amLODIPine (NORVASC) tablet 5 mg  5 mg Oral DAILY    morphine injection 2 mg  2 mg IntraVENous Q6H PRN

## 2023-03-13 NOTE — PROGRESS NOTES
End of Shift Note    Bedside shift change report given to Jackson Loza (oncoming nurse) by Justice Cortez RN (offgoing nurse). Report included the following information SBAR, Kardex, Intake/Output, MAR, Recent Results, and Cardiac Rhythm Sinus rhythm    Shift worked:  7p-7a   Shift summary and any significant changes:     Scheduled meds given. Pt had periotic confusion during my shift. At one point she was very agitated and refused to let us clean her up and even swung at the tech, By the end of the shift she was pleasant and cooperative. Education provided. Blood work done. CHG bath done.       Concerns for physician to address:       Zone phone for oncoming shift:          Activity:  Activity Level: Bed Rest  Number times ambulated in hallways past shift: 0  Number of times OOB to chair past shift: 0    Cardiac:   Cardiac Monitoring: Yes      Cardiac Rhythm: Sinus Rhythm    Access:  Current line(s): PIV     Genitourinary:   Urinary status: incontinent and external catheter    Respiratory:   O2 Device: None (Room air)  Chronic home O2 use?: NO  Incentive spirometer at bedside: NO       GI:  Last Bowel Movement Date: 03/11/23 (per chart; pt unsure)  Current diet:  ADULT DIET Regular  ADULT ORAL NUTRITION SUPPLEMENT Breakfast, Dinner; Renal Supplement  Passing flatus: YES  Tolerating current diet: YES       Pain Management:   Patient states pain is manageable on current regimen: YES    Skin:  Kvng Score: 13  Interventions: float heels and PT/OT consult    Patient Safety:  Fall Score:    Interventions: bed/chair alarm and pt to call before getting OOB       Length of Stay:  Expected LOS: 2d 4h  Actual LOS: 4      Justice Cortez RN Banner Transposition Flap Text: The defect edges were debeveled with a #15 scalpel blade.  Given the location of the defect and the proximity to free margins a Banner transposition flap was deemed most appropriate.  Using a sterile surgical marker, an appropriate flap drawn around the defect. The area thus outlined was incised deep to adipose tissue with a #15 scalpel blade.  The skin margins were undermined to an appropriate distance in all directions utilizing iris scissors.

## 2023-03-13 NOTE — CONSULTS
Vascular Surgery Consult Note  3/13/2023    Subjective:     Daniel Forbes is a very pleasant 80 y.o.  who lives alone. She ambulates with a rollator. She has a pmhx significant for DM, CKD, HTN, and HLD. She is a non-smoker. She is admitted to the hospital with sloughing necrosis of there bilateral lower extremities including shins, calves, ankle, feet, and toes. She reports chronic edema of the lower extremities. Her leg swelling has been refractory to lasix. Her wounds are management via [facility] and the [facility]. Prior to presenting she was wearing two layers compression wraps. We have been asked to evaluate. ABIs are pending which will need to be coordinated with nsg for removal of wraps prior to vascular testing. Past medical history  Diabetes mellitus   Chronic renal disease stage IV  Anemic of chronic disease  Hypertension   Hyperlipidemia  Mild cognitive deficit. History of severe obesity    Past procedural history  Removal of bilateral cataracts 2018  Cystectomy of the ear 1976     Family history  Hypertension: mother     Social history  She is a non-smoker. She denies alcohol use. She lives alone and ambulates with a rollator. She was receiving home health prior to presenting. Home medications   furosemide (LASIX) 40 mg tablet Take 1 Tablet by mouth daily. Indications: visible water retention   sodium hypochlorite (Dakin's Solution) 0.125 % soln external solution Apply 1 Each to affected area daily. metoprolol tartrate (LOPRESSOR) 50 mg tablet Take 1 Tablet by mouth two (2) times a day. amLODIPine-benazepril (LOTREL) 5-10 mg per capsule Take 1 Capsule by mouth daily. hydroCHLOROthiazide (HYDRODIURIL) 25 mg tablet Take 1 Tablet by mouth daily. Patient not taking: No sig reported   ammonium lactate (AMLACTIN EX) by Apply Externally route. polyethylene glycol (MIRALAX) 17 gram/dose powder    multivitamin (ONE A DAY) tablet Take 1 Tablet by mouth daily.    acetaminophen (TYLENOL) 500 mg tablet Take 1 Tablet by mouth every six (6) hours as needed for Pain. No Known Allergies     Review of Systems   Constitutional:  Negative for chills, fatigue and fever. HENT:  Negative for congestion. Eyes:  Negative for visual disturbance. Respiratory:  Negative for cough and shortness of breath. Cardiovascular:  Positive for leg swelling. Gastrointestinal:  Negative for nausea and vomiting. Endocrine: Negative for polydipsia. Genitourinary: Negative. Musculoskeletal:  Positive for arthralgias and gait problem. Negative for myalgias. Skin:  Positive for color change and wound. Neurological:  Negative for weakness. Hematological: Negative. Psychiatric/Behavioral: Negative. Objective:     Patient Vitals for the past 24 hrs:   BP Temp Pulse Resp SpO2 Weight   03/13/23 0840 (!) 142/56 99.1 °F (37.3 °C) 69 18 99 % --   03/13/23 0606 -- -- -- -- -- 60.7 kg (133 lb 14.4 oz)   03/13/23 0317 (!) 130/54 98.1 °F (36.7 °C) 74 14 100 % --   03/12/23 1956 (!) 113/49 98.1 °F (36.7 °C) 66 18 98 % --   03/12/23 1830 (!) 123/52 -- 71 -- -- --   03/12/23 1521 (!) 125/52 98.4 °F (36.9 °C) 63 18 100 % --   03/12/23 0949 (!) 117/51 98.8 °F (37.1 °C) 72 18 100 % --        Physical Exam  Eyes:      Pupils: Pupils are equal, round, and reactive to light. Cardiovascular:      Rate and Rhythm: Normal rate and regular rhythm. Pulmonary:      Effort: Pulmonary effort is normal. No respiratory distress. Abdominal:      General: There is no distension. Palpations: Abdomen is soft. Musculoskeletal:         General: Normal range of motion. Skin:     Comments: Wraps to bilateral lower extremities are dry and intact. Neurological:      Mental Status: She is alert. Mental status is at baseline.      Pertinent Test Results:   Recent Results (from the past 24 hour(s))   GLUCOSE, POC    Collection Time: 03/12/23  9:29 PM   Result Value Ref Range    Glucose (POC) 210 (H) 65 - 117 mg/dL Performed by Oralee Cuff (PCT)    HEPATIC FUNCTION PANEL    Collection Time: 03/13/23  3:32 AM   Result Value Ref Range    Protein, total 7.0 6.4 - 8.2 g/dL    Albumin 1.9 (L) 3.5 - 5.0 g/dL    Globulin 5.1 (H) 2.0 - 4.0 g/dL    A-G Ratio 0.4 (L) 1.1 - 2.2      Bilirubin, total 0.6 0.2 - 1.0 MG/DL    Bilirubin, direct 0.3 (H) 0.0 - 0.2 MG/DL    Alk. phosphatase 140 (H) 45 - 117 U/L    AST (SGOT) 15 15 - 37 U/L    ALT (SGPT) 18 12 - 78 U/L   CBC WITH AUTOMATED DIFF    Collection Time: 03/13/23  3:32 AM   Result Value Ref Range    WBC 11.7 (H) 3.6 - 11.0 K/uL    RBC 3.06 (L) 3.80 - 5.20 M/uL    HGB 9.4 (L) 11.5 - 16.0 g/dL    HCT 30.1 (L) 35.0 - 47.0 %    MCV 98.4 80.0 - 99.0 FL    MCH 30.7 26.0 - 34.0 PG    MCHC 31.2 30.0 - 36.5 g/dL    RDW 18.6 (H) 11.5 - 14.5 %    PLATELET 406 118 - 721 K/uL    MPV 11.1 8.9 - 12.9 FL    NRBC 0.0 0  WBC    ABSOLUTE NRBC 0.00 0.00 - 0.01 K/uL    NEUTROPHILS 75 32 - 75 %    LYMPHOCYTES 10 (L) 12 - 49 %    MONOCYTES 9 5 - 13 %    EOSINOPHILS 4 0 - 7 %    BASOPHILS 1 0 - 1 %    IMMATURE GRANULOCYTES 1 (H) 0.0 - 0.5 %    ABS. NEUTROPHILS 8.9 (H) 1.8 - 8.0 K/UL    ABS. LYMPHOCYTES 1.2 0.8 - 3.5 K/UL    ABS. MONOCYTES 1.0 0.0 - 1.0 K/UL    ABS. EOSINOPHILS 0.4 0.0 - 0.4 K/UL    ABS. BASOPHILS 0.1 0.0 - 0.1 K/UL    ABS. IMM.  GRANS. 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 Diabetes mellitus   -controlled     Chronic renal disease stage IV  -at baseline   -nephrology following   Anemic of chronic disease  -stable and not at a level to transfuse     Hypertension  -stable on Norvasc  Hyperlipidemia    Mild cognitive deficit. VTE Prophylaxis:  [facility]    Disposition:  The patient would benefit from SNF placement.      Signed By: Jeb Ramon NP     March 13, 2023

## 2023-03-14 LAB
ALBUMIN SERPL-MCNC: 1.8 G/DL (ref 3.5–5)
ALBUMIN/GLOB SERPL: 0.4 (ref 1.1–2.2)
ALP SERPL-CCNC: 107 U/L (ref 45–117)
ALT SERPL-CCNC: 18 U/L (ref 12–78)
ANION GAP SERPL CALC-SCNC: 2 MMOL/L (ref 5–15)
AST SERPL-CCNC: 17 U/L (ref 15–37)
BASOPHILS # BLD: 0.1 K/UL (ref 0–0.1)
BASOPHILS NFR BLD: 1 % (ref 0–1)
BILIRUB DIRECT SERPL-MCNC: 0.2 MG/DL (ref 0–0.2)
BILIRUB SERPL-MCNC: 0.6 MG/DL (ref 0.2–1)
BUN SERPL-MCNC: 44 MG/DL (ref 6–20)
BUN/CREAT SERPL: 22 (ref 12–20)
CALCIUM SERPL-MCNC: 8.7 MG/DL (ref 8.5–10.1)
CHLORIDE SERPL-SCNC: 118 MMOL/L (ref 97–108)
CO2 SERPL-SCNC: 25 MMOL/L (ref 21–32)
CREAT SERPL-MCNC: 2 MG/DL (ref 0.55–1.02)
DIFFERENTIAL METHOD BLD: ABNORMAL
EOSINOPHIL # BLD: 0.5 K/UL (ref 0–0.4)
EOSINOPHIL NFR BLD: 5 % (ref 0–7)
ERYTHROCYTE [DISTWIDTH] IN BLOOD BY AUTOMATED COUNT: 18.1 % (ref 11.5–14.5)
GLOBULIN SER CALC-MCNC: 4.7 G/DL (ref 2–4)
GLUCOSE SERPL-MCNC: 99 MG/DL (ref 65–100)
HCT VFR BLD AUTO: 28 % (ref 35–47)
HGB BLD-MCNC: 8.5 G/DL (ref 11.5–16)
IGA SERPL-MCNC: 464 MG/DL (ref 64–422)
IGG SERPL-MCNC: 1261 MG/DL (ref 586–1602)
IGM SERPL-MCNC: 81 MG/DL (ref 26–217)
IMM GRANULOCYTES # BLD AUTO: 0.1 K/UL (ref 0–0.04)
IMM GRANULOCYTES NFR BLD AUTO: 1 % (ref 0–0.5)
LYMPHOCYTES # BLD: 1.1 K/UL (ref 0.8–3.5)
LYMPHOCYTES NFR BLD: 12 % (ref 12–49)
MCH RBC QN AUTO: 29.5 PG (ref 26–34)
MCHC RBC AUTO-ENTMCNC: 30.4 G/DL (ref 30–36.5)
MCV RBC AUTO: 97.2 FL (ref 80–99)
MONOCYTES # BLD: 1.2 K/UL (ref 0–1)
MONOCYTES NFR BLD: 12 % (ref 5–13)
NEUTS SEG # BLD: 6.6 K/UL (ref 1.8–8)
NEUTS SEG NFR BLD: 69 % (ref 32–75)
NRBC # BLD: 0 K/UL (ref 0–0.01)
NRBC BLD-RTO: 0 PER 100 WBC
PLATELET # BLD AUTO: 239 K/UL (ref 150–400)
PMV BLD AUTO: 11.5 FL (ref 8.9–12.9)
POTASSIUM SERPL-SCNC: 5.6 MMOL/L (ref 3.5–5.1)
PROT PATTERN SERPL IFE-IMP: ABNORMAL
PROT SERPL-MCNC: 6.5 G/DL (ref 6.4–8.2)
RBC # BLD AUTO: 2.88 M/UL (ref 3.8–5.2)
SODIUM SERPL-SCNC: 145 MMOL/L (ref 136–145)
WBC # BLD AUTO: 9.6 K/UL (ref 3.6–11)

## 2023-03-14 PROCEDURE — 65270000046 HC RM TELEMETRY

## 2023-03-14 PROCEDURE — 74011250637 HC RX REV CODE- 250/637: Performed by: INTERNAL MEDICINE

## 2023-03-14 PROCEDURE — 36415 COLL VENOUS BLD VENIPUNCTURE: CPT

## 2023-03-14 PROCEDURE — 97116 GAIT TRAINING THERAPY: CPT

## 2023-03-14 PROCEDURE — 74011250636 HC RX REV CODE- 250/636: Performed by: NURSE PRACTITIONER

## 2023-03-14 PROCEDURE — 74011250636 HC RX REV CODE- 250/636: Performed by: INTERNAL MEDICINE

## 2023-03-14 PROCEDURE — 85025 COMPLETE CBC W/AUTO DIFF WBC: CPT

## 2023-03-14 PROCEDURE — 74011000258 HC RX REV CODE- 258: Performed by: NURSE PRACTITIONER

## 2023-03-14 PROCEDURE — 80076 HEPATIC FUNCTION PANEL: CPT

## 2023-03-14 PROCEDURE — 74011250637 HC RX REV CODE- 250/637: Performed by: STUDENT IN AN ORGANIZED HEALTH CARE EDUCATION/TRAINING PROGRAM

## 2023-03-14 PROCEDURE — 80048 BASIC METABOLIC PNL TOTAL CA: CPT

## 2023-03-14 RX ADMIN — PIPERACILLIN AND TAZOBACTAM 3.38 G: 3; .375 INJECTION, POWDER, LYOPHILIZED, FOR SOLUTION INTRAVENOUS at 22:42

## 2023-03-14 RX ADMIN — MORPHINE SULFATE 2 MG: 2 INJECTION, SOLUTION INTRAMUSCULAR; INTRAVENOUS at 05:30

## 2023-03-14 RX ADMIN — HEPARIN SODIUM 5000 UNITS: 5000 INJECTION INTRAVENOUS; SUBCUTANEOUS at 18:03

## 2023-03-14 RX ADMIN — EPOETIN ALFA-EPBX 10000 UNITS: 10000 INJECTION, SOLUTION INTRAVENOUS; SUBCUTANEOUS at 22:47

## 2023-03-14 RX ADMIN — AMLODIPINE BESYLATE 2.5 MG: 2.5 TABLET ORAL at 09:32

## 2023-03-14 RX ADMIN — Medication: at 09:32

## 2023-03-14 RX ADMIN — SODIUM ZIRCONIUM CYCLOSILICATE 10 G: 10 POWDER, FOR SUSPENSION ORAL at 06:21

## 2023-03-14 RX ADMIN — METOPROLOL TARTRATE 50 MG: 50 TABLET ORAL at 18:03

## 2023-03-14 RX ADMIN — PIPERACILLIN AND TAZOBACTAM 3.38 G: 3; .375 INJECTION, POWDER, LYOPHILIZED, FOR SOLUTION INTRAVENOUS at 09:32

## 2023-03-14 RX ADMIN — METOPROLOL TARTRATE 50 MG: 50 TABLET ORAL at 09:32

## 2023-03-14 RX ADMIN — FUROSEMIDE 40 MG: 40 TABLET ORAL at 09:31

## 2023-03-14 RX ADMIN — HEPARIN SODIUM 5000 UNITS: 5000 INJECTION INTRAVENOUS; SUBCUTANEOUS at 06:10

## 2023-03-14 RX ADMIN — Medication: at 22:41

## 2023-03-14 NOTE — PROGRESS NOTES
Physician Progress Note      PATIENTSheldon Sep  CSN #:                  [id number]  :                       1933  ADMIT DATE:       3/9/2023 2:19 AM  DISCH DATE:  RESPONDING  PROVIDER #:        VIGNESH SAUNDERS NP          QUERY TEXT:    Pt admitted with AGATA and Bilateral leg wounds, and has malnutrition documented in 3/10 RD pn. Noted to have moderate malnutrition in 3/10 RD pn. Please document in progress notes and discharge summary if you are evaluating and /or treating any of the following: The medical record reflects the following:    Risk Factors: 80year old with generalized weakness and bilateral leg wounds    Clinical Indicators: Malnutrition Assessment:  Malnutrition Status: Moderate malnutrition (03/10/23 1452)  Context:  Acute illness  Findings of the 6 clinical characteristics of malnutrition:  Energy Intake:  Weight Loss:  Greater than 5% over 1 month  Body Fat Loss:  Mild body fat loss, Triceps, Buccal region  Muscle Mass Loss:  Mild muscle mass loss, Clavicles (pectoralis & deltoids)  Fluid Accumulation:  Unable to assess,    Anthropometric Measures:  Height: 5' 4\" (162.6 cm)  Ideal Body Weight (IBW): 120 lbs (55 kg)  Current Body Wt:  67.9 kg (149 lb 11.1 oz), 124.7 % IBW. Stated  Current BMI (kg/m2): 25.7  BMI Category: Overweight (BMI 25.0-29. 9)    Treatment: Registered dietician consult. RD recommendations:  Continue diet as ordered; monitor K+ levels and adjust diet as needed. RD adjusted ONS from Ensure Plus to Nepro BID given prior elevated K+ levels. Please document % meals and supplements consumed in flowsheet I/O's under intake. ASPEN Criteria:  https://aspenjournals. onlinelibrary. acevedo. com/doi/full/10.1177/4046383308633843    Thank you,  Ayla Colby RN, CDI  Options provided:  -- Protein calorie malnutrition moderate  -- Other - I will add my own diagnosis  -- Disagree - Not applicable / Not valid  -- Disagree - Clinically unable to determine / Unknown  -- Refer to Clinical Documentation Reviewer    PROVIDER RESPONSE TEXT:    This patient has moderate protein calorie malnutrition.     Query created by: Yossi Goff on 3/14/2023 3:37 PM      Electronically signed by:  Luz Maria Monet NP 3/14/2023 3:42 PM

## 2023-03-14 NOTE — PROGRESS NOTES
End of Shift Note    Bedside shift change report given to Jackson Loza (oncoming nurse) by Eren Quiñonez RN (offgoing nurse). Report included the following information SBAR, Kardex, MAR, Recent Results, and Cardiac Rhythm SB    Shift worked:  Night     Shift summary and any significant changes:    Pt was in lots of pain after cleaning and turning and given pain meds accordingly. Wound care had been done just before shift change and were clean and intact during the night. Other meds given per MAR, VS within normal limits. Labs were drawn this morning. Concerns for physician to address:       Zone phone for oncoming shift:          Activity:  Activity Level: Bed Rest, Logroll  Number times ambulated in hallways past shift: 0  Number of times OOB to chair past shift: 0    Cardiac:   Cardiac Monitoring: Yes      Cardiac Rhythm: Sinus Mynor    Access:  Current line(s): PIV     Genitourinary:   Urinary status: voiding, incontinent, and external catheter    Respiratory:   O2 Device: None (Room air)  Chronic home O2 use?: NO  Incentive spirometer at bedside: NO       GI:  Last Bowel Movement Date: 03/13/23  Current diet:  ADULT DIET Regular  ADULT ORAL NUTRITION SUPPLEMENT Breakfast, Dinner; Renal Supplement  Passing flatus: YES  Tolerating current diet: YES       Pain Management:   Patient states pain is manageable on current regimen: YES    Skin:  Kvng Score: 15  Interventions: float heels and increase time out of bed    Patient Safety:  Fall Score:  Total Score: 4  Interventions: gripper socks  High Fall Risk: Yes    Length of Stay:  Expected LOS: 2d 4h  Actual LOS: 721 Lionel Saha RN

## 2023-03-14 NOTE — PROGRESS NOTES
Problem: Mobility Impaired (Adult and Pediatric)  Goal: *Acute Goals and Plan of Care (Insert Text)  Description: FUNCTIONAL STATUS PRIOR TO ADMISSION: Pt not the best historian, but reports that she uses her rollator and cane for ambulation. HOME SUPPORT PRIOR TO ADMISSION: The patient lived alone with youngest sister to provide assistance. Physical Therapy Goals  Initiated 3/9/2023  1. Patient will move from supine to sit and sit to supine , scoot up and down, and roll side to side in bed with minimal assistance/contact guard assist within 7 day(s). 2.  Patient will transfer from bed to chair and chair to bed with minimal assistance/contact guard assist using the least restrictive device within 7 day(s). 3.  Patient will perform sit to stand with minimal assistance/contact guard assist within 7 day(s). 4.  Patient will ambulate with contact guard assist for 50 feet with the least restrictive device within 7 day(s). Outcome: Progressing Towards Goal   PHYSICAL THERAPY TREATMENT  Patient: Tosin Rosales (36 y.o. female)  Date: 3/14/2023  Diagnosis: AGATA (acute kidney injury) ([facility] [address] [N17.9] <principal problem not specified>      Precautions: Bed Alarm, Fall, Skin, WBAT (fell 3-8 with L knee/wrist P!; SEVERE B LE wounds, cate/sacral wounds)  Chart, physical therapy assessment, plan of care and goals were reviewed. ASSESSMENT  Patient continues with skilled PT services and is progressing towards goals. Pt continues to progress well with therapy. She is needing cues for carry over gerardo of hand placement in sit<>stand. She requires min A to shift weight forward over feet to come to stand and increased lifting assist from lower toilet. She amb with the rolling walker with CGA 12' + 28' +30' with seated rest. She tolerated well. She rated pain at 8/10 LEs but is much more tolerable to touch and WB. LE's elevated in chair at end of session, waffle air cushion in seat for pressure relief.  Feel she will progress well with continued therapy at SNF rehab for eventual return home. Current Level of Function Impacting Discharge (mobility/balance): min to CGA; rolling walker for amb as noted above    Other factors to consider for discharge: still well below her baseline, needs cues and reminders, fall risk         PLAN :  Patient continues to benefit from skilled intervention to address the above impairments. Continue treatment per established plan of care. to address goals. Recommendation for discharge: (in order for the patient to meet his/her long term goals)  Therapy up to 5 days/week in SNF setting    This discharge recommendation:  Has been made in collaboration with the attending provider and/or case management    IF patient discharges home will need the following DME: to be determined (TBD)       SUBJECTIVE:   Patient stated I think I am doing good.     OBJECTIVE DATA SUMMARY:   Critical Behavior:  Neurologic State: Confused  Orientation Level: Oriented to person, Oriented to situation  Cognition: Decreased command following, Impaired decision making  Safety/Judgement: Decreased awareness of need for assistance, Decreased awareness of need for safety, Decreased insight into deficits  Functional Mobility Training:  Bed Mobility:     Supine to Sit: Contact guard assistance     Scooting: Contact guard assistance        Transfers:  Sit to Stand: Minimum assistance; Other (comment) (increased assist from lower seat of toilet but otherwise needing assist to shift weight forward over feet; cues for hand placement)  Stand to Sit: Contact guard assistance (cues for hand placement)        Bed to Chair: Contact guard assistance; Other (comment) (with RW once standing)                    Balance:  Sitting: Intact  Standing: Impaired  Standing - Static: Fair;Good; Other (comment) (with RW)  Standing - Dynamic : Fair; Other (comment) (with RW)  Ambulation/Gait Training:  Distance (ft): 70 Feet (ft) (12+28+30 with seated rests)  Assistive Device: Gait belt;Walker, rolling  Ambulation - Level of Assistance: Contact guard assistance        Gait Abnormalities: Decreased step clearance        Base of Support: Widened     Speed/Vickie: Slow  Step Length: Left shortened;Right shortened              Pain Ratin/10 BLE, dressings in place, LEs elevated in chair with foot rest and pillow    Activity Tolerance:   Good    After treatment patient left in no apparent distress:   Sitting in chair, Call bell within reach, and Bed / chair alarm activated    COMMUNICATION/COLLABORATION:   The patients plan of care was discussed with: Registered nurse and NP .      Arin Chester, PT   Time Calculation: 30 mins

## 2023-03-14 NOTE — PROGRESS NOTES
Vascular Surgery Progress Note  Devonna Eisenmenger ACNP-BC  3/14/2023       Subjective:     Hilda Mills is a very pleasant 80 y.o. who lives alone. She ambulates with a rollator. She has a pmhx significant for DM, CKD, HTN, and HLD. She is a non-smoker. She is admitted to the hospital with sloughing necrosis of there bilateral lower extremities including shins, calves, ankle, feet, and toes. She reports chronic edema of the lower extremities. Her leg swelling has been refractory to lasix. Her wounds are managed via [facility] and the [facility]. Prior to presenting she was wearing two layers compression wraps. We have been asked to evaluate. ABIs are non-compressible bilaterally. The right TBI is 0.76 and the left TBI is 0.92 with pulsatile  waveforms throughout. Nursing Data:     Patient Vitals for the past 24 hrs:   BP Temp Pulse Resp SpO2 Weight   03/14/23 0753 (!) 127/53 97.5 °F (36.4 °C) (!) 55 16 100 % --   03/14/23 0342 (!) 134/53 97.3 °F (36.3 °C) (!) 51 16 100 % 58.3 kg (128 lb 8.5 oz)   03/13/23 1932 (!) 132/48 98.1 °F (36.7 °C) 65 16 98 % --   03/13/23 1837 (!) 131/54 -- -- -- -- --   03/13/23 1630 (!) 140/55 98.2 °F (36.8 °C) 66 22 98 % --         Intake/Output Summary (Last 24 hours) at 3/14/2023 6603  Last data filed at 3/13/2023 2000  Gross per 24 hour   Intake 50 ml   Output --   Net 50 ml       Exam:     Physical Exam  Eyes:      Pupils: Pupils are equal, round, and reactive to light. Cardiovascular:      Rate and Rhythm: Normal rate and regular rhythm. Pulmonary:      Effort: Pulmonary effort is normal. No respiratory distress. Abdominal:      General: There is no distension. Palpations: Abdomen is soft. Musculoskeletal:         General: Normal range of motion. Skin:     Comments: Wraps to bilateral lower extremities are dry and intact. Neurological:      Mental Status: She is alert. Mental status is at baseline. Lab Review:     .   Recent Results (from the past 24 hour(s))   ANKLE BRACHIAL INDEX    Collection Time: 03/13/23 11:23 AM   Result Value Ref Range    Left arm  mmHg    Left dorsalis pedis  mmHg    Left toe pressure 119 mmHg    Right arm  mmHg    Right dorsalis pedis  mmHg    Right toe pressure 99 mmHg    Left SABA 1.35     Right SABA 1.42     Left TBI 0.92     Right TBI 0.76    HEPATIC FUNCTION PANEL    Collection Time: 03/14/23  3:20 AM   Result Value Ref Range    Protein, total 6.5 6.4 - 8.2 g/dL    Albumin 1.8 (L) 3.5 - 5.0 g/dL    Globulin 4.7 (H) 2.0 - 4.0 g/dL    A-G Ratio 0.4 (L) 1.1 - 2.2      Bilirubin, total 0.6 0.2 - 1.0 MG/DL    Bilirubin, direct 0.2 0.0 - 0.2 MG/DL    Alk. phosphatase 107 45 - 117 U/L    AST (SGOT) 17 15 - 37 U/L    ALT (SGPT) 18 12 - 78 U/L   CBC WITH AUTOMATED DIFF    Collection Time: 03/14/23  3:20 AM   Result Value Ref Range    WBC 9.6 3.6 - 11.0 K/uL    RBC 2.88 (L) 3.80 - 5.20 M/uL    HGB 8.5 (L) 11.5 - 16.0 g/dL    HCT 28.0 (L) 35.0 - 47.0 %    MCV 97.2 80.0 - 99.0 FL    MCH 29.5 26.0 - 34.0 PG    MCHC 30.4 30.0 - 36.5 g/dL    RDW 18.1 (H) 11.5 - 14.5 %    PLATELET 300 932 - 883 K/uL    MPV 11.5 8.9 - 12.9 FL    NRBC 0.0 0  WBC    ABSOLUTE NRBC 0.00 0.00 - 0.01 K/uL    NEUTROPHILS 69 32 - 75 %    LYMPHOCYTES 12 12 - 49 %    MONOCYTES 12 5 - 13 %    EOSINOPHILS 5 0 - 7 %    BASOPHILS 1 0 - 1 %    IMMATURE GRANULOCYTES 1 (H) 0.0 - 0.5 %    ABS. NEUTROPHILS 6.6 1.8 - 8.0 K/UL    ABS. LYMPHOCYTES 1.1 0.8 - 3.5 K/UL    ABS. MONOCYTES 1.2 (H) 0.0 - 1.0 K/UL    ABS. EOSINOPHILS 0.5 (H) 0.0 - 0.4 K/UL    ABS. BASOPHILS 0.1 0.0 - 0.1 K/UL    ABS. IMM.  GRANS. 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 Outpatient follow up for continued evaluation. Information placed on AVS.   Antibxs per primary team  Wound care per the [facility]. Vascular surgery signing off. We appreciate the opportunity to participate in the care of Ms. Faustina Bain. Please reconsult as needed. Diabetes mellitus   -controlled      Chronic renal disease stage IV  -at baseline  Hyperkalemia    -nephrology following   Anemic of chronic disease  -stable and not at a level to transfuse      Hypertension  -stable on Norvasc  Hyperlipidemia     Mild cognitive deficit. VTE Prophylaxis:  Critical access hospital     Disposition:  The patient would benefit from SNF placement.

## 2023-03-14 NOTE — PROGRESS NOTES
Spiritual Care Assessment/Progress Note  [facility]      NAME: Vanita Lainez      MRN: [id number]  AGE: 80 y.o.  SEX: female  Caodaism Affiliation: Orthodoxy   Language: English     3/14/2023     Total Time (in minutes): 59     Spiritual Assessment begun in [facility] through conversation with:         [x]Patient        [] Family    [] Friend(s)        Reason for Consult: Initial/Spiritual assessment, patient floor     Spiritual beliefs: (Please include comment if needed)     [x] Identifies with a israel tradition:         [] Supported by a israel community:            [] Claims no spiritual orientation:           [] Seeking spiritual identity:                [] Adheres to an individual form of spirituality:           [] Not able to assess:                           Identified resources for coping:      [x] Prayer                               [] Music                  [] Guided Imagery     [x] Family/friends                 [] Pet visits     [] Devotional reading                         [] Unknown     [] Other:                                               Interventions offered during this visit: (See comments for more details)    Patient Interventions: Initial/Spiritual assessment, patient floor, Affirmation of emotions/emotional suffering, Normalization of emotional/spiritual concerns, Prayer (assurance of), Prayer (actual), Affirmation of israel           Plan of Care:     [x] Support spiritual and/or cultural needs    [] Support AMD and/or advance care planning process      [] Support grieving process   [] Coordinate Rites and/or Rituals    [] Coordination with community clergy   [] No spiritual needs identified at this time   [] Detailed Plan of Care below (See Comments)  [] Make referral to Music Therapy  [] Make referral to Pet Therapy     [] Make referral to Addiction services  [] Make referral to [facility]  [] Make referral to Spiritual Care Partner  [] No future visits requested        [x] Contact Spiritual Care for further referrals     Comments:  reviewed the patient's chart prior to the visit. Ms. Sara Flores was sitting in her chair near the window.  provided her with blankets and closed her window because she stated she was cold. She shared her thoughts about her marriage of over 61 years, her concern for the world, her love for family, israel, God, and her service to others.  provided a presence, encouragement, prayer and empathetic listening.  services are available 24 hours a day as requested. Rev. DAGO Byrne.  [address] [facility]   Paging Service [phone])

## 2023-03-14 NOTE — PROGRESS NOTES
End of Shift Note    Bedside shift change report given to [facility], TRINY(oncoming nurse) by Gemma Walker RN (offgoing nurse). Report included the following information SBAR, Kardex, and MAR    Shift worked:  7am-7pm     Shift summary and any significant changes:     Pt tolerated care fairly well. Medications were given and education was provided. Hourly rounding completed. Pt made no complaints of pain during this shift. Pt up x2 with the walker/gait belt to the chair/BSC; pt sat in chair for most of shift. Family present at bedside. Incontinence care completed. Pt set up for meals.           Gemma Walker RN

## 2023-03-14 NOTE — PROGRESS NOTES
Hospitalist Progress Note    Subjective:   Daily Progress Note: 3/14/2023 4:51 PM    Hospital Course:  Ms. Margoth Villarreal is a 80year old female who presented to the emergency room status post fall to left knee. Patient reports she was getting ready to drive to [place] for a wound care appointment. She was outside standing on her steps when her left knee gave out and she slipped to the ground. No significant injury. She normally ambulates with a walker. She has chronic stasis wounds/ulcerations of both lower legs. On chart review she was first seen in the [facility] wound care clinic on February 15, 2023. She has bilateral lower leg edema and some small scattered ulcers. Subjective: Pt seen in room, sitting in chair, no complaints. Current Facility-Administered Medications   Medication Dose Route Frequency    epoetin mendez-epbx (RETACRIT) injection 10,000 Units  10,000 Units SubCUTAneous Q7D    amLODIPine (NORVASC) tablet 2.5 mg  2.5 mg Oral DAILY    heparin (porcine) injection 5,000 Units  5,000 Units SubCUTAneous Q12H    piperacillin-tazobactam (ZOSYN) 3.375 g in 0.9% sodium chloride (MBP/ADV) 100 mL MBP  3.375 g IntraVENous Q12H    acetaminophen (TYLENOL) tablet 650 mg  650 mg Oral Q6H PRN    Or    acetaminophen (TYLENOL) suppository 650 mg  650 mg Rectal Q6H PRN    polyethylene glycol (MIRALAX) packet 17 g  17 g Oral DAILY PRN    promethazine (PHENERGAN) tablet 12.5 mg  12.5 mg Oral Q6H PRN    Or    ondansetron (ZOFRAN) injection 4 mg  4 mg IntraVENous Q6H PRN    metoprolol tartrate (LOPRESSOR) tablet 50 mg  50 mg Oral BID    furosemide (LASIX) tablet 40 mg  40 mg Oral DAILY    ammonium lactate (LAC-HYDRIN) 12 % lotion   Topical BID    traMADoL (ULTRAM) tablet 50 mg  50 mg Oral Q6H PRN    morphine injection 2 mg  2 mg IntraVENous Q6H PRN        Review of Systems:    Review of Systems   Constitutional:  Negative for fever and malaise/fatigue.    Respiratory:  Negative for cough and shortness of breath. Cardiovascular:  Negative for chest pain and palpitations. Gastrointestinal:  Negative for heartburn, nausea and vomiting. Genitourinary:  Negative for dysuria and urgency. Neurological:  Negative for headaches. Objective:     Visit Vitals  BP (!) 127/51   Pulse 64   Temp 98.2 °F (36.8 °C)   Resp 18   Ht 5' 4\" (1.626 m)   Wt 58.3 kg (128 lb 8.5 oz)   SpO2 100%   Breastfeeding No   BMI 22.06 kg/m²      O2 Device: None (Room air)    Temp (24hrs), Av.8 °F (36.6 °C), Min:97.3 °F (36.3 °C), Max:98.2 °F (36.8 °C)      No intake/output data recorded.  1901 -  0700  In: 50 [P.O.:50]  Out: 550 [Urine:550]    PHYSICAL EXAM:    Physical Exam   Constitutional:       General: She is not in acute distress. Cardiovascular:      Rate and Rhythm: Normal rate and regular rhythm. Pulses: Normal pulses. Heart sounds: Normal heart sounds. Pulmonary:      Breath sounds: Normal breath sounds. Abdominal:      General: Bowel sounds are normal.      Palpations: Abdomen is soft. Musculoskeletal:         General: Normal range of motion. Skin:     Comments: Bilateral lower leg edema with ulcers, blackened skin,  red, venous stasis ulcers, flaky, dry   Neurological:      Mental Status: She is oriented to person, place, and time. Data Review    Recent Results (from the past 24 hour(s))   HEPATIC FUNCTION PANEL    Collection Time: 23  3:20 AM   Result Value Ref Range    Protein, total 6.5 6.4 - 8.2 g/dL    Albumin 1.8 (L) 3.5 - 5.0 g/dL    Globulin 4.7 (H) 2.0 - 4.0 g/dL    A-G Ratio 0.4 (L) 1.1 - 2.2      Bilirubin, total 0.6 0.2 - 1.0 MG/DL    Bilirubin, direct 0.2 0.0 - 0.2 MG/DL    Alk.  phosphatase 107 45 - 117 U/L    AST (SGOT) 17 15 - 37 U/L    ALT (SGPT) 18 12 - 78 U/L   CBC WITH AUTOMATED DIFF    Collection Time: 23  3:20 AM   Result Value Ref Range    WBC 9.6 3.6 - 11.0 K/uL    RBC 2.88 (L) 3.80 - 5.20 M/uL    HGB 8.5 (L) 11.5 - 16.0 g/dL    HCT 28.0 (L) 35.0 - 47.0 %    MCV 97.2 80.0 - 99.0 FL    MCH 29.5 26.0 - 34.0 PG    MCHC 30.4 30.0 - 36.5 g/dL    RDW 18.1 (H) 11.5 - 14.5 %    PLATELET 927 509 - 874 K/uL    MPV 11.5 8.9 - 12.9 FL    NRBC 0.0 0  WBC    ABSOLUTE NRBC 0.00 0.00 - 0.01 K/uL    NEUTROPHILS 69 32 - 75 %    LYMPHOCYTES 12 12 - 49 %    MONOCYTES 12 5 - 13 %    EOSINOPHILS 5 0 - 7 %    BASOPHILS 1 0 - 1 %    IMMATURE GRANULOCYTES 1 (H) 0.0 - 0.5 %    ABS. NEUTROPHILS 6.6 1.8 - 8.0 K/UL    ABS. LYMPHOCYTES 1.1 0.8 - 3.5 K/UL    ABS. MONOCYTES 1.2 (H) 0.0 - 1.0 K/UL    ABS. EOSINOPHILS 0.5 (H) 0.0 - 0.4 K/UL    ABS. BASOPHILS 0.1 0.0 - 0.1 K/UL    ABS. IMM. GRANS. 0.1 (H) 0.00 - 0.04 K/UL    DF AUTOMATED     METABOLIC PANEL, BASIC    Collection Time: 03/14/23  3:20 AM   Result Value Ref Range    Sodium 145 136 - 145 mmol/L    Potassium 5.6 (H) 3.5 - 5.1 mmol/L    Chloride 118 (H) 97 - 108 mmol/L    CO2 25 21 - 32 mmol/L    Anion gap 2 (L) 5 - 15 mmol/L    Glucose 99 65 - 100 mg/dL    BUN 44 (H) 6 - 20 MG/DL    Creatinine 2.00 (H) 0.55 - 1.02 MG/DL    BUN/Creatinine ratio 22 (H) 12 - 20      eGFR 23 (L) >60 ml/min/1.73m2    Calcium 8.7 8.5 - 10.1 MG/DL       ANKLE BRACHIAL INDEX   Final Result      CT HEAD WO CONT   Final Result   1. No acute intracranial abnormality. 2.  Left maxillary sinus mucosal thickening and fluid, may reflect acute   sinusitis. XR WRIST LT AP/LAT   Final Result   Diffuse soft tissue swelling without acute osseous abnormality. US RETROPERITONEUM COMP   Final Result   1. No hydronephrosis. Atrophic right kidney. 2.  Large calcified uterine fibroid. Active Problems:    AGATA (acute kidney injury) ([facility] [address] (3/9/2023)        Assessment/Plan:   Bilateral leg wounds- wound care per orders. BC negative so far, on IV zosyn. Follow with wound clinic after discharge. 2. Hypertension- on norvasc, metoprolol, at goal.      3. Generalized weakness- PT/OT, OOB to chair.      4. Discharge- SNF recommended, CM following , waiting authorization. 5. CKD stage 4- creatinine stable, nephrology following, hold lasix. 6. Discharge- plan for SNF, waiting authorization, CM following.          DVT Prophylaxis: heparin sq  Code Status:  Full Code  POA: TOÑO sister Jose Dunn [phone] discussed with:   _____patient staff nurse, JACKSON, sister__________________________________________________________    Jsutin Gustafson NP

## 2023-03-15 VITALS
HEIGHT: 64 IN | BODY MASS INDEX: 21.94 KG/M2 | SYSTOLIC BLOOD PRESSURE: 148 MMHG | HEART RATE: 72 BPM | OXYGEN SATURATION: 100 % | TEMPERATURE: 98.1 F | RESPIRATION RATE: 16 BRPM | WEIGHT: 128.53 LBS | DIASTOLIC BLOOD PRESSURE: 48 MMHG

## 2023-03-15 PROBLEM — G89.29 CHRONIC LEG PAIN: Status: ACTIVE | Noted: 2023-03-15

## 2023-03-15 PROBLEM — M79.606 CHRONIC LEG PAIN: Status: ACTIVE | Noted: 2023-03-15

## 2023-03-15 LAB
ALBUMIN SERPL-MCNC: 2 G/DL (ref 3.5–5)
ALBUMIN/GLOB SERPL: 0.4 (ref 1.1–2.2)
ALP SERPL-CCNC: 122 U/L (ref 45–117)
ALT SERPL-CCNC: 19 U/L (ref 12–78)
ANION GAP SERPL CALC-SCNC: 3 MMOL/L (ref 5–15)
AST SERPL-CCNC: 18 U/L (ref 15–37)
BASOPHILS # BLD: 0.1 K/UL (ref 0–0.1)
BASOPHILS NFR BLD: 1 % (ref 0–1)
BILIRUB DIRECT SERPL-MCNC: 0.2 MG/DL (ref 0–0.2)
BILIRUB SERPL-MCNC: 0.7 MG/DL (ref 0.2–1)
BUN SERPL-MCNC: 43 MG/DL (ref 6–20)
BUN/CREAT SERPL: 22 (ref 12–20)
CALCIUM SERPL-MCNC: 9.2 MG/DL (ref 8.5–10.1)
CHLORIDE SERPL-SCNC: 114 MMOL/L (ref 97–108)
CO2 SERPL-SCNC: 25 MMOL/L (ref 21–32)
CREAT SERPL-MCNC: 1.97 MG/DL (ref 0.55–1.02)
DIFFERENTIAL METHOD BLD: ABNORMAL
EOSINOPHIL # BLD: 0.4 K/UL (ref 0–0.4)
EOSINOPHIL NFR BLD: 4 % (ref 0–7)
ERYTHROCYTE [DISTWIDTH] IN BLOOD BY AUTOMATED COUNT: 17.9 % (ref 11.5–14.5)
GLOBULIN SER CALC-MCNC: 5.6 G/DL (ref 2–4)
GLUCOSE SERPL-MCNC: 92 MG/DL (ref 65–100)
HCT VFR BLD AUTO: 29.7 % (ref 35–47)
HGB BLD-MCNC: 9.2 G/DL (ref 11.5–16)
IMM GRANULOCYTES # BLD AUTO: 0.2 K/UL (ref 0–0.04)
IMM GRANULOCYTES NFR BLD AUTO: 2 % (ref 0–0.5)
LYMPHOCYTES # BLD: 1.3 K/UL (ref 0.8–3.5)
LYMPHOCYTES NFR BLD: 14 % (ref 12–49)
MCH RBC QN AUTO: 30 PG (ref 26–34)
MCHC RBC AUTO-ENTMCNC: 31 G/DL (ref 30–36.5)
MCV RBC AUTO: 96.7 FL (ref 80–99)
MONOCYTES # BLD: 1 K/UL (ref 0–1)
MONOCYTES NFR BLD: 11 % (ref 5–13)
NEUTS SEG # BLD: 6.2 K/UL (ref 1.8–8)
NEUTS SEG NFR BLD: 68 % (ref 32–75)
NRBC # BLD: 0 K/UL (ref 0–0.01)
NRBC BLD-RTO: 0 PER 100 WBC
PLATELET # BLD AUTO: 241 K/UL (ref 150–400)
PMV BLD AUTO: 11.2 FL (ref 8.9–12.9)
POTASSIUM SERPL-SCNC: 5.8 MMOL/L (ref 3.5–5.1)
PROT SERPL-MCNC: 7.6 G/DL (ref 6.4–8.2)
RBC # BLD AUTO: 3.07 M/UL (ref 3.8–5.2)
SODIUM SERPL-SCNC: 142 MMOL/L (ref 136–145)
WBC # BLD AUTO: 9.1 K/UL (ref 3.6–11)

## 2023-03-15 PROCEDURE — 74011250637 HC RX REV CODE- 250/637: Performed by: STUDENT IN AN ORGANIZED HEALTH CARE EDUCATION/TRAINING PROGRAM

## 2023-03-15 PROCEDURE — 74011250637 HC RX REV CODE- 250/637: Performed by: INTERNAL MEDICINE

## 2023-03-15 PROCEDURE — 74011000258 HC RX REV CODE- 258: Performed by: NURSE PRACTITIONER

## 2023-03-15 PROCEDURE — 36415 COLL VENOUS BLD VENIPUNCTURE: CPT

## 2023-03-15 PROCEDURE — 74011250636 HC RX REV CODE- 250/636: Performed by: NURSE PRACTITIONER

## 2023-03-15 PROCEDURE — 85025 COMPLETE CBC W/AUTO DIFF WBC: CPT

## 2023-03-15 PROCEDURE — 80076 HEPATIC FUNCTION PANEL: CPT

## 2023-03-15 PROCEDURE — 80048 BASIC METABOLIC PNL TOTAL CA: CPT

## 2023-03-15 RX ORDER — AMLODIPINE BESYLATE 2.5 MG/1
2.5 TABLET ORAL DAILY
Qty: 30 TABLET | Refills: 0 | Status: SHIPPED | OUTPATIENT
Start: 2023-03-16 | End: 2023-04-15

## 2023-03-15 RX ORDER — TRAMADOL HYDROCHLORIDE 50 MG/1
50 TABLET ORAL
Qty: 12 TABLET | Refills: 0 | Status: SHIPPED | OUTPATIENT
Start: 2023-03-15 | End: 2023-03-18

## 2023-03-15 RX ADMIN — AMLODIPINE BESYLATE 2.5 MG: 2.5 TABLET ORAL at 09:50

## 2023-03-15 RX ADMIN — FUROSEMIDE 40 MG: 40 TABLET ORAL at 13:16

## 2023-03-15 RX ADMIN — TRAMADOL HYDROCHLORIDE 50 MG: 50 TABLET ORAL at 12:12

## 2023-03-15 RX ADMIN — SODIUM ZIRCONIUM CYCLOSILICATE 10 G: 10 POWDER, FOR SUSPENSION ORAL at 06:35

## 2023-03-15 RX ADMIN — METOPROLOL TARTRATE 50 MG: 50 TABLET ORAL at 09:50

## 2023-03-15 RX ADMIN — PIPERACILLIN AND TAZOBACTAM 3.38 G: 3; .375 INJECTION, POWDER, LYOPHILIZED, FOR SOLUTION INTRAVENOUS at 09:50

## 2023-03-15 RX ADMIN — HEPARIN SODIUM 5000 UNITS: 5000 INJECTION INTRAVENOUS; SUBCUTANEOUS at 06:34

## 2023-03-15 NOTE — PROGRESS NOTES
Bilat leg wound care dressing cleansed and changed per order. IV removed. Report called to [facility] and received by Mor Vna RN. Hospital to home to transport patient at 1500.    3 hard copies of scripts sent with transport team. Amlodipine, epoetin mendez-epbx, and tramadol. Signed by [place] NP.     Catherine Jaimes RN

## 2023-03-15 NOTE — PROGRESS NOTES
End of Shift Note    Bedside shift change report given to Cliff Haile RN (oncoming nurse) by Tiffanie Griffin LPN (offgoing nurse). Report included the following information SBAR, Kardex, and MAR    Shift worked:  7p-7:30a     Shift summary and any significant changes: All pt medications administered per MAR. Pt denies having pain during shift. Patient care provided, zinc cream applied to pt sacra. Pt had x1 large BM during shift. Pt I/O emptied and charted. Pt requires reminders and redirecting to stay in bed during shift. , bed alarm on. Pt am scheduled labs drawn.    Concerns for physician to address:       Zone phone for oncoming shift:            Tiffanie Griffin LPN

## 2023-03-15 NOTE — PROGRESS NOTES
Transition of Care Plan to SNF/Rehab    Communication to Patient/Family:  Met with patient and family and they are agreeable to the transition plan. The Plan for Transition of Care is related to the following treatment goals:     CM aware that pt received insurance auth for snf placement: [facility] at the time of d/c.  CM spoke with pts sister: Napoleon Granado, via telephone to inform her of pts d/c. Napoleon Granado gave verbal consent for pts d/c. The Patient and/or patient representative was provided with a choice of provider and agrees  with the discharge plan. Yes [x] No []    A Freedom of choice list was provided with basic dialogue that supports the patient's individualized plan of care/goals and shares the quality data associated with the providers. Yes [x] No []    SNF/Rehab Transition:  Patient has been accepted to [facility] SNF/Rehab and meets criteria for admission. Patient will transported by [facility] and expected to leave at 3P. Communication to SNF/Rehab:  Bedside RN, ONC Unit, has been notified to update the transition plan to the facility and call report (). Discharge information has been updated on the AVS. And communicated to facility via [employer], or CC link. Discharge instructions to be fax'd to facility at [facility] #). [] BCPI-A  Patient has been identified as part of the  BCPI-A Program.  For Care Coordination associated with that Bundle Program, please contact   Bundle information has been communication to . Nursing Please include all hard scripts for controlled substances, med rec and dc summary, and AVS in packet. Reviewed and confirmed with facility, [facility], can manage the patient care needs for the following:     Ngoc Holder with (X) only those applicable:  Medication:  [x]Medications are available at the facility  [x]IV Antibiotics    []Controlled Substance - hard copies available sent.   []Weekly Labs    Equipment:  []CPAP/BiPAP  []Wound Vacuum  []Arcniiega or Urinary Device  []PICC/Central Line  []Nebulizer  []Ventilator    Treatment:  []Isolation (for MRSA, VRE, etc.)  []Surgical Drain Management  []Tracheostomy Care  []Dressing Changes  []Dialysis with transportation  []PEG Care  []Oxygen  []Daily Weights for Heart Failure    Dietary:  []Any diet limitations  []Tube Feedings   []Total Parenteral Management (TPN)    Financial Resources:  []Medicaid Application Completed    []UAI Completed and copy given to pt/family  and copy given to pt/family  []A screening has previously been completed. []Level II Completed    [] Private pay individual who will not become   financially eligible for Medicaid within 6 months from admission to a [address] [place] [address] facility. [] Individual refused to have screening conducted. []Medicaid Application Completed    []The screening denied because it was determined individual did not need/did not qualify for nursing facility level of care. [] Out of state residents seeking direct admission to a [address] facility. [] Individuals who are inpatients of an out of state hospital, or in state or out of state veterans/ hospital and seek direct admission to a [address] facility  [] Individuals who are pateints or residents of a state owned/operated facility that is licensed by [employer] and seek direct admission to [address] [place] [address]  [] A screening not required for enrollment in [facility] services as set out in [address] [place] [address] 02-  [] [facility]) staff shall perform screenings of the [facility] clients. Advanced Care Plan:  []Surrogate Decision Maker of Care  []POA  []Communicated Code Status and copy sent.     Other:

## 2023-03-15 NOTE — DISCHARGE SUMMARY
Hospitalist Discharge Summary     Patient ID:    Sejal Morgan  [id number]  79 y.o.  7/24/1933    Admit date: 3/9/2023    Discharge date : 3/15/2023        Final Diagnoses: Active Problems:    AGAAT (acute kidney injury) ([address] (3/9/2023)      Chronic leg pain (3/15/2023)  Non healing chronic bilateral leg ulcers    Fall    Reason for Hospitalization:  AGATA, Fall, non healing chronic bilateral leg ulcers    Hospital Course:   Ms. Julio Cesar Morales is a 80year old female who presented to the emergency room status post fall to left knee. Patient reports she was getting ready to go  for a wound care appointment. She was outside standing on her steps when her left knee gave out and she slipped to the ground. No significant injury. She normally ambulates with a walker. She has chronic stasis wounds/ulcerations of both lower legs. On chart review she was first seen in the [facility] wound care clinic on February 15, 2023. She has bilateral lower leg edema and some small scattered ulcers. , presented with AGATA d/t use of diuretics and volume depletion. She was started on IV antibiotics, IV fluids and wound/ostomy RN was consulted for treatment. Renal function has improved, adjustment in diuretic and discontinuing HCTZ and ACE-I. Ankle/brachial index is normal, blood cultures have been negative, and leukocytosis has normalized. Vascular surgery recommends continuation of wound care and no surgical intervention is necessary at this time and has completed seven days of IV antibiotics. Pt will continue with current medications prescribed and follow up with the wound clinic and PCP after discharge from SNF. Pt is stable for discharge today to SNF. Discharge Medications:   Current Discharge Medication List        START taking these medications    Details   amLODIPine (NORVASC) 2.5 mg tablet Take 1 Tablet by mouth daily for 30 days.   Qty: 30 Tablet, Refills: 0  Start date: 3/16/2023, End date: 4/15/2023      epoetin mendez-epbx (RETACRIT) 10,000 unit/mL injection 1 mL by SubCUTAneous route every seven (7) days for 30 days. Indications: anemia due to kidney failure  Qty: 4 Each, Refills: 0  Start date: 3/21/2023, End date: 4/20/2023      traMADoL (ULTRAM) 50 mg tablet Take 1 Tablet by mouth every six (6) hours as needed for Pain for up to 3 days. Max Daily Amount: 200 mg. Qty: 12 Tablet, Refills: 0  Start date: 3/15/2023, End date: 3/18/2023    Associated Diagnoses: Non-pressure chronic ulcer of left lower leg with fat layer exposed ([address]           CONTINUE these medications which have NOT CHANGED    Details   furosemide (LASIX) 40 mg tablet Take 1 Tablet by mouth daily. Indications: visible water retention  Qty: 90 Tablet, Refills: 1    Associated Diagnoses: Essential hypertension      metoprolol tartrate (LOPRESSOR) 50 mg tablet Take 1 Tablet by mouth two (2) times a day. Qty: 180 Tablet, Refills: 3    Associated Diagnoses: Essential hypertension      ammonium lactate (AMLACTIN EX) by Apply Externally route. multivitamin (ONE A DAY) tablet Take 1 Tablet by mouth daily. acetaminophen (TYLENOL) 500 mg tablet Take 1 Tablet by mouth every six (6) hours as needed for Pain. Qty: 30 Tablet, Refills: 0           STOP taking these medications       sodium hypochlorite (Dakin's Solution) 0.125 % soln external solution Comments:   Reason for Stopping:         amLODIPine-benazepril (LOTREL) 5-10 mg per capsule Comments:   Reason for Stopping:         hydroCHLOROthiazide (HYDRODIURIL) 25 mg tablet Comments:   Reason for Stopping:         polyethylene glycol (MIRALAX) 17 gram/dose powder Comments:   Reason for Stopping: Follow up Care:    1.  Lark Fabry, NP in 1 month      Follow-up Information       Follow up With Specialties Details Why Contact Info    [facility] [facility] Follow up in 1 week(s) draw CBC, BMP in one week, notify facility provider of results [place]  [address]   [address] [facility]    Jimbo Rahman NP Nurse Practitioner Follow up in 1 month(s)  [address]      Brendon Jung MD General Surgery, Vascular Surgery Follow up in 2 week(s) follow up for regular wound care appointments as scheduled. [address] [facility] [address]  [facility] [address]  [facility]  [phone]                * Follow-up Care/Patient Instructions: Activity: Activity as tolerated  Diet: Regular Diet  Wound Care: As directed      Wound care for the lower legs to be done every other day:  Pre-medicate patient before the wound care ! Cleanse the legs, feet and toesand in between the toes all over with DynaHex soap and wipe with water wet soft cloths to clean well. Go back to the open wounds and cleanse them with the Vashe Solution and wipe with gauze. Let the surrounding skin dry. Apply the zinc oxide cream to the skin around the wounds and then dressed the open wounds with Opticel Ag (you will need 8), Optilock pads (need 4 for each leg). Cover all of it with 2 high drainage ABD pads and wrap with Yimi and then an ACE wrap on each leg. Put gauze in between the toes to keep them dry. Float the heels. Wound care for the buttocks;  Cleanse the patient with soap and water and dry well. Apply the zinc oxide cream generously to the skin, gluteal cleft and in between the thighs. Leave open to air. TURN patient every two hour. Obtain an air pump for her bed. Condition at Discharge:  Stable  __________________________________________________________________    Disposition  [place]  ____________________________________________________________________    Code Status:  Full Code  ___________________________________________________________________    Discharge Exam:  Patient seen and examined by me on discharge day.   Physical Exam   Constitutional: General: She is not in acute distress. Cardiovascular:      Rate and Rhythm: Normal rate and regular rhythm. Pulses: Normal pulses. Heart sounds: Normal heart sounds. Pulmonary:      Breath sounds: Normal breath sounds. Abdominal:      General: Bowel sounds are normal.      Palpations: Abdomen is soft. Musculoskeletal:         General: Normal range of motion. Skin:     Comments: Bilateral lower leg edema with ulcers, blackened skin,  red, venous stasis ulcers, flaky, dry   Neurological:      Mental Status: She is oriented to person, place, and time. CONSULTATIONS: Vascular Surgery, Nephrology    Significant Diagnostic Studies:   Recent Results (from the past 24 hour(s))   HEPATIC FUNCTION PANEL    Collection Time: 03/15/23  2:58 AM   Result Value Ref Range    Protein, total 7.6 6.4 - 8.2 g/dL    Albumin 2.0 (L) 3.5 - 5.0 g/dL    Globulin 5.6 (H) 2.0 - 4.0 g/dL    A-G Ratio 0.4 (L) 1.1 - 2.2      Bilirubin, total 0.7 0.2 - 1.0 MG/DL    Bilirubin, direct 0.2 0.0 - 0.2 MG/DL    Alk.  phosphatase 122 (H) 45 - 117 U/L    AST (SGOT) 18 15 - 37 U/L    ALT (SGPT) 19 12 - 78 U/L   METABOLIC PANEL, BASIC    Collection Time: 03/15/23  2:58 AM   Result Value Ref Range    Sodium 142 136 - 145 mmol/L    Potassium 5.8 (H) 3.5 - 5.1 mmol/L    Chloride 114 (H) 97 - 108 mmol/L    CO2 25 21 - 32 mmol/L    Anion gap 3 (L) 5 - 15 mmol/L    Glucose 92 65 - 100 mg/dL    BUN 43 (H) 6 - 20 MG/DL    Creatinine 1.97 (H) 0.55 - 1.02 MG/DL    BUN/Creatinine ratio 22 (H) 12 - 20      eGFR 24 (L) >60 ml/min/1.73m2    Calcium 9.2 8.5 - 10.1 MG/DL   CBC WITH AUTOMATED DIFF    Collection Time: 03/15/23  6:56 AM   Result Value Ref Range    WBC 9.1 3.6 - 11.0 K/uL    RBC 3.07 (L) 3.80 - 5.20 M/uL    HGB 9.2 (L) 11.5 - 16.0 g/dL    HCT 29.7 (L) 35.0 - 47.0 %    MCV 96.7 80.0 - 99.0 FL    MCH 30.0 26.0 - 34.0 PG    MCHC 31.0 30.0 - 36.5 g/dL    RDW 17.9 (H) 11.5 - 14.5 %    PLATELET 388 470 - 246 K/uL    MPV 11.2 8.9 - 12.9 FL NRBC 0.0 0  WBC    ABSOLUTE NRBC 0.00 0.00 - 0.01 K/uL    NEUTROPHILS 68 32 - 75 %    LYMPHOCYTES 14 12 - 49 %    MONOCYTES 11 5 - 13 %    EOSINOPHILS 4 0 - 7 %    BASOPHILS 1 0 - 1 %    IMMATURE GRANULOCYTES 2 (H) 0.0 - 0.5 %    ABS. NEUTROPHILS 6.2 1.8 - 8.0 K/UL    ABS. LYMPHOCYTES 1.3 0.8 - 3.5 K/UL    ABS. MONOCYTES 1.0 0.0 - 1.0 K/UL    ABS. EOSINOPHILS 0.4 0.0 - 0.4 K/UL    ABS. BASOPHILS 0.1 0.0 - 0.1 K/UL    ABS. IMM. GRANS. 0.2 (H) 0.00 - 0.04 K/UL    DF AUTOMATED       ANKLE BRACHIAL INDEX   Final Result      CT HEAD WO CONT   Final Result   1. No acute intracranial abnormality. 2.  Left maxillary sinus mucosal thickening and fluid, may reflect acute   sinusitis. XR WRIST LT AP/LAT   Final Result   Diffuse soft tissue swelling without acute osseous abnormality. US RETROPERITONEUM COMP   Final Result   1. No hydronephrosis. Atrophic right kidney. 2.  Large calcified uterine fibroid. Discharge: time spent 35 minutes in discharge  Education and counseling.      Signed:  Trace Howard NP  3/15/2023  2:38 PM

## 2023-03-15 NOTE — PROGRESS NOTES
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 Creatinine 2.0 in 2022. Restart PO Lasix at home dose     Urine sediment bland. US shows no hydro and a small right kidney. NEMO neg. FLC ratio 2.7    Soft BP better, continue on decreased Amlodipine dose     K 5.6, give lokelma again today    Hgb < 10, iron status adequate, continue retacrit             Subjective:     Chief Complaint:  Tolerating p.o. no acute c/o; ace wrap to legs    Review of Systems:    Symptom Y/N Comments  Symptom Y/N Comments   Fever/Chills    Chest Pain     Poor Appetite    Edema     Cough    Abdominal Pain     Sputum    Joint Pain     SOB/AMADOR    Pruritis/Rash     Nausea/vomit    Tolerating PT/OT     Diarrhea    Tolerating Diet     Constipation    Other       Could not obtain due to:      Objective:     VITALS:   Last 24hrs VS reviewed since prior progress note.  Most recent are:  Visit Vitals  BP (!) 149/51   Pulse 63   Temp 98.2 °F (36.8 °C)   Resp 18   Ht 5' 4\" (1.626 m)   Wt 58.3 kg (128 lb 8.5 oz)   SpO2 100%   Breastfeeding No   BMI 22.06 kg/m²     No intake or output data in the 24 hours ending 03/15/23 0535     Telemetry Reviewed:     PHYSICAL EXAM:  General: NAD  improved edema- protective pads over both heel      Lab Data Reviewed: (see below)    Medications Reviewed: (see below)    PMH/SH reviewed - no change compared to H&P  ________________________________________________________________________  Care Plan discussed with:  Patient     Family      RN     Care Manager                    Consultant:          Comments   >50% of visit spent in counseling and coordination of care ________________________________________________________________________  Shelby Scott MD     Procedures: see electronic medical records for all procedures/Xrays and details which  were not copied into this note but were reviewed prior to creation of Plan.       LABS:  Recent Labs     03/14/23  0320 03/13/23  0332   WBC 9.6 11.7*   HGB 8.5* 9.4*   HCT 28.0* 30.1*    249       Recent Labs     03/15/23  0258 03/14/23  0320    145   K 5.8* 5.6*   * 118*   CO2 25 25   BUN 43* 44*   CREA 1.97* 2.00*   GLU 92 99   CA 9.2 8.7             MEDICATIONS:  Current Facility-Administered Medications   Medication Dose Route Frequency    epoetin mendez-epbx (RETACRIT) injection 10,000 Units  10,000 Units SubCUTAneous Q7D    amLODIPine (NORVASC) tablet 2.5 mg  2.5 mg Oral DAILY    heparin (porcine) injection 5,000 Units  5,000 Units SubCUTAneous Q12H    piperacillin-tazobactam (ZOSYN) 3.375 g in 0.9% sodium chloride (MBP/ADV) 100 mL MBP  3.375 g IntraVENous Q12H    acetaminophen (TYLENOL) tablet 650 mg  650 mg Oral Q6H PRN    Or    acetaminophen (TYLENOL) suppository 650 mg  650 mg Rectal Q6H PRN    polyethylene glycol (MIRALAX) packet 17 g  17 g Oral DAILY PRN    promethazine (PHENERGAN) tablet 12.5 mg  12.5 mg Oral Q6H PRN    Or    ondansetron (ZOFRAN) injection 4 mg  4 mg IntraVENous Q6H PRN    metoprolol tartrate (LOPRESSOR) tablet 50 mg  50 mg Oral BID    [Held by provider] furosemide (LASIX) tablet 40 mg  40 mg Oral DAILY    ammonium lactate (LAC-HYDRIN) 12 % lotion   Topical BID    traMADoL (ULTRAM) tablet 50 mg  50 mg Oral Q6H PRN    morphine injection 2 mg  2 mg IntraVENous Q6H PRN

## 2023-03-15 NOTE — PROGRESS NOTES
Problem: Falls - Risk of  Goal: *Absence of Falls  Description: Document Howie Cr Fall Risk and appropriate interventions in the flowsheet. Outcome: Progressing Towards Goal  Note: Fall Risk Interventions:       Mentation Interventions: Bed/chair exit alarm    Medication Interventions: Bed/chair exit alarm, Patient to call before getting OOB    Elimination Interventions:  Toileting schedule/hourly rounds    History of Falls Interventions: Bed/chair exit alarm

## 2023-03-16 LAB
BACTERIA SPEC CULT: NORMAL
SERVICE CMNT-IMP: NORMAL

## 2023-03-27 ENCOUNTER — TELEPHONE (OUTPATIENT)
Dept: FAMILY MEDICINE CLINIC | Age: 88
End: 2023-03-27

## 2023-03-27 NOTE — TELEPHONE ENCOUNTER
----- Message from Shannan Huynh NP sent at 3/27/2023  4:20 PM EDT -----  Regarding: Chris Hauser and her sister . I scheduled her for [facility] on April 4 that 1:30 pm   Nursing Please call for CLAUDIO if it has not been completed.

## 2023-03-27 NOTE — TELEPHONE ENCOUNTER
Care Transitions Initial Follow Up Call    Outreach made within 2 business days of discharge: Yes    Patient: Jensen Prieto Patient : 1933   MRN: [id number]  Reason for Leg wounds / both,  Edema and PT Admission: 2023   Discharge Date: 2023       Spoke with: Ms Anthony Alford    Discharge department/facility: [facility] Interactive Patient Contact:  Was patient able to fill all prescriptions: Yes  Was patient instructed to bring all medications to the follow-up visit: Yes  Is patient taking all medications as directed in the discharge summary? Yes  Does patient understand their discharge instructions: Yes  Does patient have questions or concerns that need addressed prior to 7-14 day follow up office visit: No    Scheduled appointment with PCP within 7-14 days    Follow Up  No future appointments.     Karoline Hall

## 2023-04-04 ENCOUNTER — OFFICE VISIT (OUTPATIENT)
Dept: FAMILY MEDICINE CLINIC | Age: 88
End: 2023-04-04
Payer: MEDICARE

## 2023-04-04 PROCEDURE — G8417 CALC BMI ABV UP PARAM F/U: HCPCS | Performed by: NURSE PRACTITIONER

## 2023-04-04 PROCEDURE — G8432 DEP SCR NOT DOC, RNG: HCPCS | Performed by: NURSE PRACTITIONER

## 2023-04-04 PROCEDURE — 1111F DSCHRG MED/CURRENT MED MERGE: CPT | Performed by: NURSE PRACTITIONER

## 2023-04-04 PROCEDURE — 1101F PT FALLS ASSESS-DOCD LE1/YR: CPT | Performed by: NURSE PRACTITIONER

## 2023-04-04 PROCEDURE — G8427 DOCREV CUR MEDS BY ELIG CLIN: HCPCS | Performed by: NURSE PRACTITIONER

## 2023-04-04 PROCEDURE — 1090F PRES/ABSN URINE INCON ASSESS: CPT | Performed by: NURSE PRACTITIONER

## 2023-04-04 PROCEDURE — 99214 OFFICE O/P EST MOD 30 MIN: CPT | Performed by: NURSE PRACTITIONER

## 2023-04-04 PROCEDURE — G8536 NO DOC ELDER MAL SCRN: HCPCS | Performed by: NURSE PRACTITIONER

## 2023-04-04 PROCEDURE — 1123F ACP DISCUSS/DSCN MKR DOCD: CPT | Performed by: NURSE PRACTITIONER

## 2023-04-04 RX ORDER — ONDANSETRON 4 MG/1
4 TABLET, ORALLY DISINTEGRATING ORAL
Qty: 10 TABLET | Refills: 0
Start: 2023-04-04

## 2023-04-04 RX ORDER — AMLODIPINE BESYLATE 2.5 MG/1
2.5 TABLET ORAL DAILY
Qty: 90 TABLET | Refills: 1 | Status: SHIPPED
Start: 2023-04-04 | End: 2023-10-01

## 2023-04-04 RX ORDER — FUROSEMIDE 40 MG/1
40 TABLET ORAL DAILY
Qty: 90 TABLET | Refills: 1 | Status: SHIPPED
Start: 2023-04-04

## 2023-04-04 NOTE — PROGRESS NOTES
Chief Complaint   Patient presents with    Chronic Kidney Disease     Followup Hospital AGATA     YES Answers must have Comments  1. \"Have you been to the ER, urgent care clinic since your last visit? Hospitalized since your last visit? \"    [x] YES 3- [facility]  [] NO       2. Dasie Camps you seen or consulted any other health care providers outside of [address] [place] [address] since your last visit?     [] YES   [x] NO       3. For patients aged 39-70: Have you had a colorectal cancer screening such as a colonoscopy/FIT/Cologuard? Nurse/CMA to request records if not in chart   [] YES   [] NO    NA, based on age    If the patient is female:      4. For female patients aged 41-77: [facility] you had a mammogram in the last two years?  Nurse/CMA to request records if not in chart   [] YES   [] NO   [x] NA, based on age    11. For female patients aged 21-65: [facility]ie [facility] you had a pap smear?   Nurse/CMA to request records if not in chart   [] YES   [] NO  [x] NA, based on age

## 2023-04-05 ENCOUNTER — TELEPHONE (OUTPATIENT)
Dept: FAMILY MEDICINE CLINIC | Age: 88
End: 2023-04-05

## 2023-04-05 NOTE — ACP (ADVANCE CARE PLANNING)
Has advanced medical directive scanned into chart. Reviewed at this visit. No changes.   Denice POOLE

## 2023-04-05 NOTE — PROGRESS NOTES
Subjective:     Trevor Alonso is a 80 y.o. female who presents today with the following:  Chief Complaint   Patient presents with    Chronic Kidney Disease     Followup Hospital AGATA       Patient Active Problem List   Diagnosis Code    Hypertension I10    Hypercholesterolemia E78.00    Chronic kidney disease N18.9    Type 2 diabetes with nephropathy (HCC) E11.21    Severe obesity (BMI 35.0-39. 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 Blood pressure normotensive. AGATA; followed by specialist - nephrologist Dr. Bev Reed. PVD; bilateral left and right lower extremities healing venous stasis ulcers. Bilateral lower extremity wraps recently redressed ,  Mr. Italia Lennon and her sister endorse there is scant to no leakage from her wounds through her bandage. She declines having her leg dressing unwrapped. Requesting a hospital bed and disabled parking pass. depression screening addressed not at risk    abuse screening addressed denies    learning assessment addressed reviewed nurses notes    fall risk addressed at risk reviewed fall prevention as part of this encounter. HM: addressed she declines Shingrix vaccine today.     ROS:  Gen: denies fever, chills, fatigue, weight loss, weight gain  HEENT:denies blurry vision, nasal congestion, sore throat  Resp: denies dypsnea, cough, wheezing  CV: denies chest pain radiating to the jaws or arms, palpitations, lower extremity edema  Abd: denies nausea, vomiting, diarrhea, constipation  Neuro: denies numbness/tingling  Endo: denies polyuria, polydipsia, heat/cold intolerance  Heme: no lymphadenopathy    No Known Allergies      Current Outpatient Medications:     amLODIPine (NORVASC) 2.5 mg tablet, Take 1 Tablet by mouth daily for 180 days. Indications: high blood pressure, Disp: 90 Tablet, Rfl: 1    furosemide (LASIX) 40 mg tablet, Take 1 Tablet by mouth daily. Indications: visible water retention, Disp: 90 Tablet, Rfl: 1    ondansetron (ZOFRAN ODT) 4 mg disintegrating tablet, Take 1 Tablet by mouth every eight (8) hours as needed for Nausea or Vomiting., Disp: 10 Tablet, Rfl: 0    metoprolol tartrate (LOPRESSOR) 50 mg tablet, Take 1 Tablet by mouth two (2) times a day., Disp: 180 Tablet, Rfl: 3    ammonium lactate (AMLACTIN EX), by Apply Externally route., Disp: , Rfl:     multivitamin (ONE A DAY) tablet, Take 1 Tablet by mouth daily. , Disp: , Rfl:     acetaminophen (TYLENOL) 500 mg tablet, Take 1 Tablet by mouth every six (6) hours as needed for Pain., Disp: 30 Tablet, Rfl: 0    epoetin mendez-epbx (RETACRIT) 10,000 unit/mL injection, 1 mL by SubCUTAneous route every seven (7) days for 30 days.  Indications: anemia due to kidney failure (Patient not taking: Reported on 4/4/2023), Disp: 4 Each, Rfl: 0    Past Medical History:   Diagnosis Date    Chronic kidney disease     Hypercholesterolemia     Hypertension        Past Surgical History:   Procedure Laterality Date    HX CATARACT REMOVAL Right 03/27/2018    HX CATARACT REMOVAL  05/2018    left    HX CYST REMOVAL  1976    ear,       Social History     Tobacco Use   Smoking Status Never   Smokeless Tobacco Never       Social History     Socioeconomic History    Marital status:    Tobacco Use    Smoking status: Never    Smokeless tobacco: Never   Vaping Use    Vaping Use: Never used   Substance and Sexual Activity Alcohol use: No    Drug use: Never   Other Topics Concern     Service No    Blood Transfusions No    Caffeine Concern No    Occupational Exposure No    Hobby Hazards No    Sleep Concern No    Stress Concern No    Weight Concern No    Special Diet No    Back Care No    Exercise Yes    Bike Helmet No    Seat Belt Yes    Self-Exams Yes     Social Determinants of Health     Financial Resource Strain: Low Risk     Difficulty of Paying Living Expenses: Not hard at all   Food Insecurity: No Food Insecurity    Worried About Running Out of Food in the Last Year: Never true    Ran Out of Food in the Last Year: Never true   Transportation Needs: No Transportation Needs    Lack of Transportation (Medical): No    Lack of Transportation (Non-Medical): No   Physical Activity: Inactive    Days of Exercise per Week: 0 days    Minutes of Exercise per Session: 0 min   Housing Stability: Unknown    Unable to Pay for Housing in the Last Year: No    Number of Places Lived in the Last Year: 1       Family History   Problem Relation Age of Onset    Hypertension Mother          Objective:     Visit Vitals  /66 (BP 1 Location: Right arm, BP Patient Position: Sitting, BP Cuff Size: Adult)   Pulse 67   Temp 97.5 °F (36.4 °C) (Temporal)   Resp 18   Ht 5' 4\" (1.626 m)   Wt 163 lb (73.9 kg)   SpO2 98%   BMI 27.98 kg/m²     Body mass index is 27.98 kg/m². General: Alert and oriented. No acute distress. Well nourished  HEENT :  Ears:TMs are normal. Canals are clear. Eyes: pupils equal, round, react to light and accommodation. Extra ocular movements intact. Nose: patent. Mouth and throat is clear. Neck:supple full range of motion no thyromegaly. Trachea midline, No carotid bruits. No significant lymphadenopathy  Lungs[de-identified] clear to auscultation without wheezes, rales, or rhonchi. Heart :RRR, S1 & S2 are normal intensity. No murmur; no gallop  Abdomen: bowel sounds active.  No tenderness, guarding, rebound, masses, hepatic or spleen enlargement  Back: no CVA tenderness. Extremities: without clubbing, cyanosis, or edema, bilateral leg wraps are dry and intact. Pulses: radial and femoral pulses are normal  Neuro: HMF intact. Cranial nerves II through XII grossly normal.  Motor: is 5 over 5 and symmetrical.   Deep tendon reflexes: +2 equal  Psych:appropriate behavior, mood, affect and judgement. Results for orders placed or performed during the hospital encounter of 03/09/23   CULTURE, BLOOD, PAIRED    Specimen: Blood   Result Value Ref Range    Special Requests: NO SPECIAL REQUESTS      Culture result: NO GROWTH 6 DAYS     CBC W/O DIFF   Result Value Ref Range    WBC 16.4 (H) 3.6 - 11.0 K/uL    RBC 3.33 (L) 3.80 - 5.20 M/uL    HGB 10.1 (L) 11.5 - 16.0 g/dL    HCT 31.4 (L) 35.0 - 47.0 %    MCV 94.3 80.0 - 99.0 FL    MCH 30.3 26.0 - 34.0 PG    MCHC 32.2 30.0 - 36.5 g/dL    RDW 20.2 (H) 11.5 - 14.5 %    PLATELET 227 443 - 785 K/uL    MPV 12.2 8.9 - 12.9 FL    NRBC 0.0 0  WBC    ABSOLUTE NRBC 0.00 0.00 - 8.30 K/uL   METABOLIC PANEL, COMPREHENSIVE   Result Value Ref Range    Sodium 144 136 - 145 mmol/L    Potassium 4.4 3.5 - 5.1 mmol/L    Chloride 118 (H) 97 - 108 mmol/L    CO2 22 21 - 32 mmol/L    Anion gap 4 (L) 5 - 15 mmol/L    Glucose 110 (H) 65 - 100 mg/dL     (H) 6 - 20 MG/DL    Creatinine 2.99 (H) 0.55 - 1.02 MG/DL    BUN/Creatinine ratio 34 (H) 12 - 20      eGFR 14 (L) >60 ml/min/1.73m2    Calcium 8.8 8.5 - 10.1 MG/DL    Bilirubin, total 1.1 (H) 0.2 - 1.0 MG/DL    ALT (SGPT) 26 12 - 78 U/L    AST (SGOT) 20 15 - 37 U/L    Alk.  phosphatase 112 45 - 117 U/L    Protein, total 7.2 6.4 - 8.2 g/dL    Albumin 2.2 (L) 3.5 - 5.0 g/dL    Globulin 5.0 (H) 2.0 - 4.0 g/dL    A-G Ratio 0.4 (L) 1.1 - 2.2     FREE LIGHT CHAINS, KAPPA/LAMBDA, QT   Result Value Ref Range    Free Kappa Lt Chains, serum 127.3 (H) 3.3 - 19.4 mg/L    Free Lambda Lt Chains, serum 47.8 (H) 5.7 - 26.3 mg/L    Kappa/Lambda ratio, serum 2.66 (H) 0.26 - 1.65 IMMUNOELECTROPHORESIS [facility].)   Result Value Ref Range    Immunofixation, serum Comment (A)      Immunoglobulin G, Qt. 1,261 586 - 1,602 mg/dL    Immunoglobulin A, Qt. 464 (H) 64 - 422 mg/dL    Immunoglobulin M, Qt. 81 26 - 052 mg/dL   METABOLIC PANEL, COMPREHENSIVE   Result Value Ref Range    Sodium 148 (H) 136 - 145 mmol/L    Potassium 4.4 3.5 - 5.1 mmol/L    Chloride 121 (H) 97 - 108 mmol/L    CO2 23 21 - 32 mmol/L    Anion gap 4 (L) 5 - 15 mmol/L    Glucose 98 65 - 100 mg/dL    BUN 74 (H) 6 - 20 MG/DL    Creatinine 2.36 (H) 0.55 - 1.02 MG/DL    BUN/Creatinine ratio 31 (H) 12 - 20      eGFR 19 (L) >60 ml/min/1.73m2    Calcium 8.7 8.5 - 10.1 MG/DL    Bilirubin, total 1.2 (H) 0.2 - 1.0 MG/DL    ALT (SGPT) 21 12 - 78 U/L    AST (SGOT) 17 15 - 37 U/L    Alk. phosphatase 96 45 - 117 U/L    Protein, total 6.6 6.4 - 8.2 g/dL    Albumin 2.0 (L) 3.5 - 5.0 g/dL    Globulin 4.6 (H) 2.0 - 4.0 g/dL    A-G Ratio 0.4 (L) 1.1 - 2.2     CBC WITH AUTOMATED DIFF   Result Value Ref Range    WBC 13.6 (H) 3.6 - 11.0 K/uL    RBC 3.20 (L) 3.80 - 5.20 M/uL    HGB 9.7 (L) 11.5 - 16.0 g/dL    HCT 30.7 (L) 35.0 - 47.0 %    MCV 95.9 80.0 - 99.0 FL    MCH 30.3 26.0 - 34.0 PG    MCHC 31.6 30.0 - 36.5 g/dL    RDW 19.3 (H) 11.5 - 14.5 %    PLATELET 682 451 - 151 K/uL    MPV 11.4 8.9 - 12.9 FL    NRBC 0.0 0  WBC    ABSOLUTE NRBC 0.00 0.00 - 0.01 K/uL    NEUTROPHILS 80 (H) 32 - 75 %    LYMPHOCYTES 6 (L) 12 - 49 %    MONOCYTES 11 5 - 13 %    EOSINOPHILS 2 0 - 7 %    BASOPHILS 0 0 - 1 %    IMMATURE GRANULOCYTES 1 (H) 0.0 - 0.5 %    ABS. NEUTROPHILS 10.9 (H) 1.8 - 8.0 K/UL    ABS. LYMPHOCYTES 0.8 0.8 - 3.5 K/UL    ABS. MONOCYTES 1.5 (H) 0.0 - 1.0 K/UL    ABS. EOSINOPHILS 0.3 0.0 - 0.4 K/UL    ABS. BASOPHILS 0.0 0.0 - 0.1 K/UL    ABS. IMM.  GRANS. 0.1 (H) 0.00 - 0.04 K/UL    DF SMEAR SCANNED      RBC COMMENTS ANISOCYTOSIS  1+       URINALYSIS W/ REFLEX CULTURE    Specimen: Urine   Result Value Ref Range    Color YELLOW/STRAW Appearance CLEAR CLEAR      Specific gravity 1.015      pH (UA) 5.0 5.0 - 8.0      Protein TRACE (A) NEG mg/dL    Glucose Negative NEG mg/dL    Ketone Negative NEG mg/dL    Bilirubin Negative NEG      Blood Negative NEG      Urobilinogen 0.2 0.2 - 1.0 EU/dL    Nitrites Negative NEG      Leukocyte Esterase Negative NEG      UA:UC IF INDICATED CULTURE NOT INDICATED BY UA RESULT CNI      WBC 0-4 0 - 4 /hpf    RBC 0-5 0 - 5 /hpf    Epithelial cells FEW FEW /lpf    Bacteria Negative NEG /hpf    Hyaline cast 2-5 0 - 2 /lpf   METABOLIC PANEL, BASIC   Result Value Ref Range    Sodium 144 136 - 145 mmol/L    Potassium 4.3 3.5 - 5.1 mmol/L    Chloride 117 (H) 97 - 108 mmol/L    CO2 22 21 - 32 mmol/L    Anion gap 5 5 - 15 mmol/L    Glucose 82 65 - 100 mg/dL    BUN 59 (H) 6 - 20 MG/DL    Creatinine 2.05 (H) 0.55 - 1.02 MG/DL    BUN/Creatinine ratio 29 (H) 12 - 20      eGFR 23 (L) >60 ml/min/1.73m2    Calcium 8.9 8.5 - 10.1 MG/DL   HEPATIC FUNCTION PANEL   Result Value Ref Range    Protein, total 6.5 6.4 - 8.2 g/dL    Albumin 1.9 (L) 3.5 - 5.0 g/dL    Globulin 4.6 (H) 2.0 - 4.0 g/dL    A-G Ratio 0.4 (L) 1.1 - 2.2      Bilirubin, total 1.1 (H) 0.2 - 1.0 MG/DL    Bilirubin, direct 0.4 (H) 0.0 - 0.2 MG/DL    Alk. phosphatase 104 45 - 117 U/L    AST (SGOT) 12 (L) 15 - 37 U/L    ALT (SGPT) 17 12 - 78 U/L   HEPATIC FUNCTION PANEL   Result Value Ref Range    Protein, total 6.9 6.4 - 8.2 g/dL    Albumin 1.9 (L) 3.5 - 5.0 g/dL    Globulin 5.0 (H) 2.0 - 4.0 g/dL    A-G Ratio 0.4 (L) 1.1 - 2.2      Bilirubin, total 1.0 0.2 - 1.0 MG/DL    Bilirubin, direct 0.3 (H) 0.0 - 0.2 MG/DL    Alk.  phosphatase 126 (H) 45 - 117 U/L    AST (SGOT) 15 15 - 37 U/L    ALT (SGPT) 18 12 - 78 U/L   METABOLIC PANEL, BASIC   Result Value Ref Range    Sodium 144 136 - 145 mmol/L    Potassium 4.9 3.5 - 5.1 mmol/L    Chloride 117 (H) 97 - 108 mmol/L    CO2 21 21 - 32 mmol/L    Anion gap 6 5 - 15 mmol/L    Glucose 114 (H) 65 - 100 mg/dL    BUN 53 (H) 6 - 20 MG/DL Creatinine 2.17 (H) 0.55 - 1.02 MG/DL    BUN/Creatinine ratio 24 (H) 12 - 20      eGFR 21 (L) >60 ml/min/1.73m2    Calcium 8.9 8.5 - 10.1 MG/DL   HEPATIC FUNCTION PANEL   Result Value Ref Range    Protein, total 7.0 6.4 - 8.2 g/dL    Albumin 1.9 (L) 3.5 - 5.0 g/dL    Globulin 5.1 (H) 2.0 - 4.0 g/dL    A-G Ratio 0.4 (L) 1.1 - 2.2      Bilirubin, total 0.6 0.2 - 1.0 MG/DL    Bilirubin, direct 0.3 (H) 0.0 - 0.2 MG/DL    Alk. phosphatase 140 (H) 45 - 117 U/L    AST (SGOT) 15 15 - 37 U/L    ALT (SGPT) 18 12 - 78 U/L   CBC WITH AUTOMATED DIFF   Result Value Ref Range    WBC 11.7 (H) 3.6 - 11.0 K/uL    RBC 3.06 (L) 3.80 - 5.20 M/uL    HGB 9.4 (L) 11.5 - 16.0 g/dL    HCT 30.1 (L) 35.0 - 47.0 %    MCV 98.4 80.0 - 99.0 FL    MCH 30.7 26.0 - 34.0 PG    MCHC 31.2 30.0 - 36.5 g/dL    RDW 18.6 (H) 11.5 - 14.5 %    PLATELET 234 440 - 166 K/uL    MPV 11.1 8.9 - 12.9 FL    NRBC 0.0 0  WBC    ABSOLUTE NRBC 0.00 0.00 - 0.01 K/uL    NEUTROPHILS 75 32 - 75 %    LYMPHOCYTES 10 (L) 12 - 49 %    MONOCYTES 9 5 - 13 %    EOSINOPHILS 4 0 - 7 %    BASOPHILS 1 0 - 1 %    IMMATURE GRANULOCYTES 1 (H) 0.0 - 0.5 %    ABS. NEUTROPHILS 8.9 (H) 1.8 - 8.0 K/UL    ABS. LYMPHOCYTES 1.2 0.8 - 3.5 K/UL    ABS. MONOCYTES 1.0 0.0 - 1.0 K/UL    ABS. EOSINOPHILS 0.4 0.0 - 0.4 K/UL    ABS. BASOPHILS 0.1 0.0 - 0.1 K/UL    ABS. IMM. GRANS. 0.1 (H) 0.00 - 0.04 K/UL    DF AUTOMATED     FERRITIN   Result Value Ref Range    Ferritin 244 8 - 252 NG/ML   IRON PROFILE   Result Value Ref Range    Iron 42 35 - 150 ug/dL    TIBC 146 (L) 250 - 450 ug/dL    Iron % saturation 29 20 - 50 %   HEPATIC FUNCTION PANEL   Result Value Ref Range    Protein, total 6.5 6.4 - 8.2 g/dL    Albumin 1.8 (L) 3.5 - 5.0 g/dL    Globulin 4.7 (H) 2.0 - 4.0 g/dL    A-G Ratio 0.4 (L) 1.1 - 2.2      Bilirubin, total 0.6 0.2 - 1.0 MG/DL    Bilirubin, direct 0.2 0.0 - 0.2 MG/DL    Alk.  phosphatase 107 45 - 117 U/L    AST (SGOT) 17 15 - 37 U/L    ALT (SGPT) 18 12 - 78 U/L   CBC WITH AUTOMATED DIFF   Result Value Ref Range    WBC 9.6 3.6 - 11.0 K/uL    RBC 2.88 (L) 3.80 - 5.20 M/uL    HGB 8.5 (L) 11.5 - 16.0 g/dL    HCT 28.0 (L) 35.0 - 47.0 %    MCV 97.2 80.0 - 99.0 FL    MCH 29.5 26.0 - 34.0 PG    MCHC 30.4 30.0 - 36.5 g/dL    RDW 18.1 (H) 11.5 - 14.5 %    PLATELET 395 595 - 548 K/uL    MPV 11.5 8.9 - 12.9 FL    NRBC 0.0 0  WBC    ABSOLUTE NRBC 0.00 0.00 - 0.01 K/uL    NEUTROPHILS 69 32 - 75 %    LYMPHOCYTES 12 12 - 49 %    MONOCYTES 12 5 - 13 %    EOSINOPHILS 5 0 - 7 %    BASOPHILS 1 0 - 1 %    IMMATURE GRANULOCYTES 1 (H) 0.0 - 0.5 %    ABS. NEUTROPHILS 6.6 1.8 - 8.0 K/UL    ABS. LYMPHOCYTES 1.1 0.8 - 3.5 K/UL    ABS. MONOCYTES 1.2 (H) 0.0 - 1.0 K/UL    ABS. EOSINOPHILS 0.5 (H) 0.0 - 0.4 K/UL    ABS. BASOPHILS 0.1 0.0 - 0.1 K/UL    ABS. IMM. GRANS. 0.1 (H) 0.00 - 0.04 K/UL    DF AUTOMATED     METABOLIC PANEL, BASIC   Result Value Ref Range    Sodium 145 136 - 145 mmol/L    Potassium 5.6 (H) 3.5 - 5.1 mmol/L    Chloride 118 (H) 97 - 108 mmol/L    CO2 25 21 - 32 mmol/L    Anion gap 2 (L) 5 - 15 mmol/L    Glucose 99 65 - 100 mg/dL    BUN 44 (H) 6 - 20 MG/DL    Creatinine 2.00 (H) 0.55 - 1.02 MG/DL    BUN/Creatinine ratio 22 (H) 12 - 20      eGFR 23 (L) >60 ml/min/1.73m2    Calcium 8.7 8.5 - 10.1 MG/DL   HEPATIC FUNCTION PANEL   Result Value Ref Range    Protein, total 7.6 6.4 - 8.2 g/dL    Albumin 2.0 (L) 3.5 - 5.0 g/dL    Globulin 5.6 (H) 2.0 - 4.0 g/dL    A-G Ratio 0.4 (L) 1.1 - 2.2      Bilirubin, total 0.7 0.2 - 1.0 MG/DL    Bilirubin, direct 0.2 0.0 - 0.2 MG/DL    Alk.  phosphatase 122 (H) 45 - 117 U/L    AST (SGOT) 18 15 - 37 U/L    ALT (SGPT) 19 12 - 78 U/L   METABOLIC PANEL, BASIC   Result Value Ref Range    Sodium 142 136 - 145 mmol/L    Potassium 5.8 (H) 3.5 - 5.1 mmol/L    Chloride 114 (H) 97 - 108 mmol/L    CO2 25 21 - 32 mmol/L    Anion gap 3 (L) 5 - 15 mmol/L    Glucose 92 65 - 100 mg/dL    BUN 43 (H) 6 - 20 MG/DL    Creatinine 1.97 (H) 0.55 - 1.02 MG/DL    BUN/Creatinine ratio 22 (H) 12 - 20      eGFR 24 (L) >60 ml/min/1.73m2    Calcium 9.2 8.5 - 10.1 MG/DL   CBC WITH AUTOMATED DIFF   Result Value Ref Range    WBC 9.1 3.6 - 11.0 K/uL    RBC 3.07 (L) 3.80 - 5.20 M/uL    HGB 9.2 (L) 11.5 - 16.0 g/dL    HCT 29.7 (L) 35.0 - 47.0 %    MCV 96.7 80.0 - 99.0 FL    MCH 30.0 26.0 - 34.0 PG    MCHC 31.0 30.0 - 36.5 g/dL    RDW 17.9 (H) 11.5 - 14.5 %    PLATELET 978 478 - 068 K/uL    MPV 11.2 8.9 - 12.9 FL    NRBC 0.0 0  WBC    ABSOLUTE NRBC 0.00 0.00 - 0.01 K/uL    NEUTROPHILS 68 32 - 75 %    LYMPHOCYTES 14 12 - 49 %    MONOCYTES 11 5 - 13 %    EOSINOPHILS 4 0 - 7 %    BASOPHILS 1 0 - 1 %    IMMATURE GRANULOCYTES 2 (H) 0.0 - 0.5 %    ABS. NEUTROPHILS 6.2 1.8 - 8.0 K/UL    ABS. LYMPHOCYTES 1.3 0.8 - 3.5 K/UL    ABS. MONOCYTES 1.0 0.0 - 1.0 K/UL    ABS. EOSINOPHILS 0.4 0.0 - 0.4 K/UL    ABS. BASOPHILS 0.1 0.0 - 0.1 K/UL    ABS. IMM.  GRANS. 0.2 (H) 0.00 - 0.04 K/UL    DF AUTOMATED     GLUCOSE, POC   Result Value Ref Range    Glucose (POC) 88 65 - 117 mg/dL    Performed by [address] [facility]    GLUCOSE, POC   Result Value Ref Range    Glucose (POC) 77 65 - 117 mg/dL    Performed by [address] PCT    GLUCOSE, POC   Result Value Ref Range    Glucose (POC) 141 (H) 65 - 117 mg/dL    Performed by Reji Cohen PCT    GLUCOSE, POC   Result Value Ref Range    Glucose (POC) 110 65 - 117 mg/dL    Performed by Oralee Cuff (PCT)    GLUCOSE, POC   Result Value Ref Range    Glucose (POC) 84 65 - 117 mg/dL    Performed by Beto Rivera PCT    GLUCOSE, POC   Result Value Ref Range    Glucose (POC) 124 (H) 65 - 117 mg/dL    Performed by Beto iGoet PCT    GLUCOSE, POC   Result Value Ref Range    Glucose (POC) 112 65 - 117 mg/dL    Performed by Beto Chalet PCT    GLUCOSE, POC   Result Value Ref Range    Glucose (POC) 187 (H) 65 - 117 mg/dL    Performed by Oralee Cuff (PCT)    GLUCOSE, POC   Result Value Ref Range    Glucose (POC) 210 (H) 65 - 117 mg/dL    Performed by Oralee Cuff (PCT)    GLUCOSE, POC   Result Value Ref Range    Glucose (POC) 136 (H) 65 - 117 mg/dL    Performed by Odalys Garcia PCT    ANKLE BRACHIAL INDEX   Result Value Ref Range    Left arm  mmHg    Left dorsalis pedis  mmHg    Left toe pressure 119 mmHg    Right arm  mmHg    Right dorsalis pedis  mmHg    Right toe pressure 99 mmHg    Left SABA 1.35     Right SABA 1.42     Left TBI 0.92     Right TBI 0.76        No results found for this visit on 04/04/23. Assessment/ Plan:     1. Essential hypertension    - furosemide (LASIX) 40 mg tablet; Take 1 Tablet by mouth daily. Indications: visible water retention  Dispense: 90 Tablet; Refill: 1    2. Non-pressure chronic ulcer of left lower leg with fat layer exposed ([address]    - AMB SUPPLY ORDER    3. Non-pressure chronic ulcer of right lower leg, with fat layer exposed ([address]    - AMB SUPPLY ORDER    4. PVD (peripheral vascular disease) ([facility])    - AMB SUPPLY ORDER      Orders Placed This Encounter    [address] bed with railings electric. DX W51.332, L97.912,I73.9    amLODIPine (NORVASC) 2.5 mg tablet     Sig: Take 1 Tablet by mouth daily for 180 days. Indications: high blood pressure     Dispense:  90 Tablet     Refill:  1    furosemide (LASIX) 40 mg tablet     Sig: Take 1 Tablet by mouth daily. Indications: visible water retention     Dispense:  90 Tablet     Refill:  1    ondansetron (ZOFRAN ODT) 4 mg disintegrating tablet     Sig: Take 1 Tablet by mouth every eight (8) hours as needed for Nausea or Vomiting. Dispense:  10 Tablet     Refill:  0         Verbal and written instructions (see AVS) provided. Patient expresses understanding of diagnosis and treatment plan.     Health Maintenance Due   Topic Date Due    Shingles Vaccine (1 of 2) Never done               CYDNEY Garcia

## 2023-04-05 NOTE — TELEPHONE ENCOUNTER
Dakota Rice called to confirm the order she received for an hillary boot change 3 x wk, but has no opening wounds. Benjamin Murphy, the order did not come from her.    Timothy Miles per Trupti's request.

## 2023-05-01 ENCOUNTER — HOSPITAL ENCOUNTER (OUTPATIENT)
Dept: WOUND CARE | Age: 88
Discharge: HOME | End: 2023-05-01
Payer: MEDICARE

## 2023-05-01 VITALS
RESPIRATION RATE: 18 BRPM | HEART RATE: 58 BPM | TEMPERATURE: 97.2 F | SYSTOLIC BLOOD PRESSURE: 174 MMHG | DIASTOLIC BLOOD PRESSURE: 74 MMHG

## 2023-05-01 DIAGNOSIS — L97.912 NON-PRESSURE CHRONIC ULCER OF RIGHT LOWER LEG, WITH FAT LAYER EXPOSED (HCC): ICD-10-CM

## 2023-05-01 DIAGNOSIS — L97.922 NON-PRESSURE CHRONIC ULCER OF LEFT LOWER LEG WITH FAT LAYER EXPOSED (HCC): Primary | ICD-10-CM

## 2023-05-01 DIAGNOSIS — N18.4 CKD (CHRONIC KIDNEY DISEASE) STAGE 4, GFR 15-29 ML/MIN (HCC): ICD-10-CM

## 2023-05-01 DIAGNOSIS — L97.922 NON-PRESSURE CHRONIC ULCER OF LEFT LOWER LEG WITH FAT LAYER EXPOSED (HCC): ICD-10-CM

## 2023-05-01 DIAGNOSIS — R60.0 BILATERAL LEG EDEMA: ICD-10-CM

## 2023-05-01 PROCEDURE — 99214 OFFICE O/P EST MOD 30 MIN: CPT | Performed by: SURGERY

## 2023-05-01 PROCEDURE — 99214 OFFICE O/P EST MOD 30 MIN: CPT

## 2023-05-01 RX ORDER — LIDOCAINE 40 MG/G
CREAM TOPICAL ONCE
Status: CANCELLED | OUTPATIENT
Start: 2023-05-01 | End: 2023-05-01

## 2023-05-01 RX ORDER — BACITRACIN ZINC AND POLYMYXIN B SULFATE 500; 1000 [USP'U]/G; [USP'U]/G
OINTMENT TOPICAL ONCE
Status: CANCELLED | OUTPATIENT
Start: 2023-05-01 | End: 2023-05-01

## 2023-05-01 RX ORDER — CLOBETASOL PROPIONATE 0.5 MG/G
OINTMENT TOPICAL ONCE
Status: CANCELLED | OUTPATIENT
Start: 2023-05-01 | End: 2023-05-01

## 2023-05-01 RX ORDER — BACITRACIN 500 [USP'U]/G
OINTMENT TOPICAL ONCE
Status: CANCELLED | OUTPATIENT
Start: 2023-05-01 | End: 2023-05-01

## 2023-05-01 RX ORDER — LIDOCAINE HYDROCHLORIDE 20 MG/ML
JELLY TOPICAL ONCE
Status: CANCELLED | OUTPATIENT
Start: 2023-05-01 | End: 2023-05-01

## 2023-05-01 RX ORDER — GENTAMICIN SULFATE 1 MG/G
OINTMENT TOPICAL ONCE
Status: CANCELLED | OUTPATIENT
Start: 2023-05-01 | End: 2023-05-01

## 2023-05-01 RX ORDER — MUPIROCIN 20 MG/G
OINTMENT TOPICAL ONCE
OUTPATIENT
Start: 2023-05-01 | End: 2023-05-01

## 2023-05-01 RX ORDER — LIDOCAINE HYDROCHLORIDE 20 MG/ML
JELLY TOPICAL ONCE
OUTPATIENT
Start: 2023-05-01 | End: 2023-05-01

## 2023-05-01 RX ORDER — SILVER SULFADIAZINE 10 G/1000G
CREAM TOPICAL ONCE
OUTPATIENT
Start: 2023-05-01 | End: 2023-05-01

## 2023-05-01 RX ORDER — TRIAMCINOLONE ACETONIDE 1 MG/G
OINTMENT TOPICAL ONCE
OUTPATIENT
Start: 2023-05-01 | End: 2023-05-01

## 2023-05-01 RX ORDER — LIDOCAINE 40 MG/G
CREAM TOPICAL ONCE
OUTPATIENT
Start: 2023-05-01 | End: 2023-05-01

## 2023-05-01 RX ORDER — M-VIT,TX,IRON,MINS/CALC/FOLIC 27MG-0.4MG
1 TABLET ORAL DAILY
COMMUNITY

## 2023-05-01 RX ORDER — BACITRACIN ZINC AND POLYMYXIN B SULFATE 500; 1000 [USP'U]/G; [USP'U]/G
OINTMENT TOPICAL ONCE
OUTPATIENT
Start: 2023-05-01 | End: 2023-05-01

## 2023-05-01 RX ORDER — BACITRACIN 500 [USP'U]/G
OINTMENT TOPICAL ONCE
OUTPATIENT
Start: 2023-05-01 | End: 2023-05-01

## 2023-05-01 RX ORDER — LIDOCAINE HYDROCHLORIDE 40 MG/ML
SOLUTION TOPICAL ONCE
OUTPATIENT
Start: 2023-05-01 | End: 2023-05-01

## 2023-05-01 RX ORDER — GENTAMICIN SULFATE 1 MG/G
OINTMENT TOPICAL ONCE
OUTPATIENT
Start: 2023-05-01 | End: 2023-05-01

## 2023-05-01 RX ORDER — AMMONIUM LACTATE 12 G/100G
1 LOTION TOPICAL PRN
COMMUNITY

## 2023-05-01 RX ORDER — CLOBETASOL PROPIONATE 0.5 MG/G
OINTMENT TOPICAL ONCE
OUTPATIENT
Start: 2023-05-01 | End: 2023-05-01

## 2023-05-01 RX ORDER — TRIAMCINOLONE ACETONIDE 1 MG/G
OINTMENT TOPICAL ONCE
Status: CANCELLED | OUTPATIENT
Start: 2023-05-01 | End: 2023-05-01

## 2023-05-01 RX ORDER — ONDANSETRON 4 MG/1
4 TABLET, FILM COATED ORAL EVERY 8 HOURS PRN
COMMUNITY

## 2023-05-01 RX ORDER — SILVER SULFADIAZINE 10 G/1000G
CREAM TOPICAL ONCE
Status: CANCELLED | OUTPATIENT
Start: 2023-05-01 | End: 2023-05-01

## 2023-05-01 RX ORDER — LIDOCAINE HYDROCHLORIDE 40 MG/ML
SOLUTION TOPICAL ONCE
Status: CANCELLED | OUTPATIENT
Start: 2023-05-01 | End: 2023-05-01

## 2023-05-01 RX ORDER — MUPIROCIN 20 MG/G
OINTMENT TOPICAL ONCE
Status: CANCELLED | OUTPATIENT
Start: 2023-05-01 | End: 2023-05-01

## 2023-05-01 NOTE — PROGRESS NOTES
HISTORY OF PRESENT ILLNESS:  The patient is an 80-year-old woman who presents to the wound care center regarding ulcerations on both lower legs. The patient was first seen at the [address] [place] [address] on 2/15/2023. The patient was last seen at the wound care center on 2/22/2023. She returned on 5/1/2023. The patient is not sure when she developed these ulcerations. She was referred here by her nurse practitioner. The patient has been treated with  Meen[employer] boot. The patient has developed a large amount of drainage from her legs. The legs are painful in the area of the ulcerations. The patient reports that she can stand and walk with a walker. Observation by the nurses in the wound care center indicated the patient has very limited mobility. The patient lives by herself. The patient has history of prediabetes. She does not report cardiac symptoms. There is no history of MI or coronary intervention. The patient denies shortness of breath. The patient had been sleeping in a lift chair in a reclined position with her feet substantially lower than her heart. She now is sleeping in a hospital bed. The patient did not take a diuretic as of 2/15/2023. By description, she drinks multiple quarts of water per day. I sent message to her primary Polo ROGER Garcia; on 2/16/2023 she started her on Lasix 40 mg po daily and gave a referral to Dr. Renan Aldana, nephrology. The patient reports frequently having relatively poor oral intake of food. She also describes eating a relatively high salt diet. She lives alone and prepares her own food. Past medical history includes chronic kidney disease stage IV, hypercholesterolemia, hypertension. The patient has never smoked. The patient was hospitalized 3/9/2023 through 3/15/2023 after a fall. She was found to have acute kidney injury. While in the hospital she received IV antibiotics.   SABA was noted to be normal.  The patient was discharged to a skilled nursing facility. On discharge the patient was to continue furosemide 40 mg/day. Dressings as of 2/15/2023:  Right and left legs  -   Wash lower legs with Dakin's solution, then saline. Apply Xeroform over ulcers, ABD, roll gauze. Apply Ace wraps from base of toes to just above the ankle then from above ankle to upper calf. Home Health will change dressing three times per week. The patient was last seen at the wound care center on 2/22/2023. Seen jacob on 5/1/2023. She is to see Dr Rawland Cranker on 5/3/2023. PHYSICAL EXAMINATION:     GENERAL:  The patient is an alert elderly woman in no acute distress. EXTREMITIES:  Examination of the right lower extremity reveals 2+ palpable dorsalis pedis pulse. There was 2+ pitting edema in the right lower extremity up to the level of the groin. There were a few small ulcerations on the left lower leg as a cluster. Dimensions in nurses notes. Tissues were  boggy. Examination of the left lower extremity revealed 2+ palpable dorsalis pedis pulse. There was 2+ pitting edema of the entire lower extremity up to the level of the groin. There were scattered small ulcers on the lower leg as a cluster. Dimensions in nurses notes. Tissues were boggy. The patient has ulcerations in right and left lower leg secondary to severe lower extremity edema. I recommended the patient  take a low salt diet. Effect of salt intake on edema was reviewed. The patient was given information regarding a low-salt diet. The patient should avoid excessive fluid intake. Her history does not suggest that she drinks large amounts of fluid. See nephrologist for determining dosing on diuretic, fluid restriction, salt restriction. I have recommended the patient achieve a position for sleeping at night in which her feet and heart are at the same level.   The site of sleeping, whether it is a lift chair, sofa, or bed, is not important as long she achieves that position. Dressings ordered:  Right and left legs  -   Wash lower legs with soap and water, including feet and toes. Adaptic placed over the ulcers. Apply dry gauze and roll gauze. Apply single-layer Tubigrip on both legs from base of toes to upper calf. Change dressing 3 times per week and as needed. I have ordered right and left Farrow wraps. The patient will follow up in wound care center in 2 weeks. DIAGNOSES:  Nonpressure ulcers right lower leg with fat layer exposed, nonpressure ulcers left lower leg with fat layer exposed, bilateral leg edema, chronic kidney disease.      L97.912, L97.922, R60.0, N18.4        Denice Jain MD

## 2023-05-01 NOTE — WOUND CARE
05/01/23 1111   Wound Leg lower Right cluster 05/01/23   Date First Assessed/Time First Assessed: 05/01/23 1108   Wound Approximate Age at First Assessment (Weeks): 100 weeks  Primary Wound Type: Venous Ulcer  Location: Leg lower  Wound Location Orientation: Right  Wound Description: cluster  Date of First ... Wound Image    Wound Etiology Venous   Dressing Status Old drainage noted   Cleansed Soap and water   Wound Length (cm) 7.2 cm   Wound Width (cm) 4 cm   Wound Depth (cm) 0.1 cm   Wound Surface Area (cm^2) 28.8 cm^2   Wound Volume (cm^3) 2.88 cm^3   Wound Assessment Pink/red   Drainage Amount Small   Drainage Description Serous   Wound Odor None   Edges Undefined edges   Wound Thickness Description Full thickness   Wound Leg lower Left cluster 05/01/23   Date First Assessed/Time First Assessed: 05/01/23 1110   Wound Approximate Age at First Assessment (Weeks): 100 weeks  Primary Wound Type: Venous Ulcer  Location: Leg lower  Wound Location Orientation: Left  Wound Description: cluster  Date of First O...    Wound Image    Wound Etiology Venous   Dressing Status Old drainage noted   Cleansed Cleansed with saline   Wound Length (cm) 2.9 cm   Wound Width (cm) 0.7 cm   Wound Depth (cm) 0.1 cm   Wound Surface Area (cm^2) 2.03 cm^2   Wound Volume (cm^3) 0.203 cm^3   Wound Assessment Pink/red   Drainage Amount Small   Drainage Description Serous   Wound Odor None   Edges Undefined edges   Wound Thickness Description Full thickness     Visit Vitals  BP (!) 174/74 (BP 1 Location: Left upper arm, BP Patient Position: At rest)   Pulse (!) 58   Temp 97.2 °F (36.2 °C)   Resp 18

## 2023-05-01 NOTE — DISCHARGE INSTRUCTIONS
Discharge Instructions [address], [address] [place] [address] Christina Orosco, [id number]  Telephone: [phone]    NAME:  Valencia Lora OF BIRTH:  7/24/1933  MEDICAL RECORD NUMBER:  [id number]  DATE:  5/1/2023  WOUND CARE ORDERS:  Bilateral lower extremity wounds :Cleanse with Soap and water , apply primary dressing adaptic, dry gauze, rolled gauze and single tubi  F. Care to be done 3 x week and as needed. F/U in 2 week. TREATMENT ORDERS:    Elevate leg(s) above the level of the heart when sitting. Avoid prolonged standing in one place. Do no get dressing/wrap wet. Follow Diet as prescribed:   [] Diet as tolerated: [] Calorie Diabetic Diet: Low carb and no Sugar [] No Added Salt:  [] Increase Protein: [] Limit the amount of liquid you are drinking and avoid drinking in between meals     Return Appointment:  [x] Return Appointment: With DR William Christian  in  [facility])  [] Nurse Visit : *** days  [] Ordered tests:    Electronically signed Marj Lomeli RN on 5/1/2023 at 11:20 AM     [address] Information: Should you experience any significant changes in your wound(s) or have questions about your wound care, please contact the [address] [place] [address] at [address] [place] [address] 8:00 am - 4:30. If you need help with your wound outside these hours and cannot wait until we are again available, contact your PCP or go to the hospital emergency room. PLEASE NOTE: IF YOU ARE UNABLE TO OBTAIN WOUND SUPPLIES, CONTINUE TO USE THE SUPPLIES YOU HAVE AVAILABLE UNTIL YOU ARE ABLE TO REACH US. IT IS MOST IMPORTANT TO KEEP THE WOUND COVERED AT ALL TIMES.      Physician Signature:_______________________    Date: ___________ Time:  ____________

## 2023-05-15 ENCOUNTER — HOSPITAL ENCOUNTER (OUTPATIENT)
Facility: HOSPITAL | Age: 88
Discharge: HOME | End: 2023-05-15
Payer: MEDICARE

## 2023-05-15 VITALS
TEMPERATURE: 97.4 F | SYSTOLIC BLOOD PRESSURE: 214 MMHG | RESPIRATION RATE: 16 BRPM | HEART RATE: 63 BPM | DIASTOLIC BLOOD PRESSURE: 84 MMHG

## 2023-05-15 PROBLEM — L97.912 NON-PRESSURE CHRONIC ULCER OF RIGHT LOWER LEG WITH FAT LAYER EXPOSED (HCC): Status: RESOLVED | Noted: 2023-02-15 | Resolved: 2023-05-15

## 2023-05-15 PROBLEM — L97.912 NON-PRESSURE CHRONIC ULCER OF RIGHT LOWER LEG WITH FAT LAYER EXPOSED (HCC): Status: ACTIVE | Noted: 2023-02-15

## 2023-05-15 PROBLEM — L97.922 NON-PRESSURE CHRONIC ULCER OF LEFT LOWER LEG WITH FAT LAYER EXPOSED (HCC): Status: RESOLVED | Noted: 2023-02-15 | Resolved: 2023-05-15

## 2023-05-15 PROCEDURE — 99213 OFFICE O/P EST LOW 20 MIN: CPT

## 2023-05-15 PROCEDURE — 99213 OFFICE O/P EST LOW 20 MIN: CPT | Performed by: SURGERY

## 2023-05-15 RX ORDER — AMLODIPINE BESYLATE 5 MG/1
5 TABLET ORAL DAILY
COMMUNITY
Start: 2022-06-30

## 2023-05-15 NOTE — DISCHARGE INSTRUCTIONS
Discharge Instructions/Wound Care Orders  [address]  [facility] 1, [address] [facility] [place]  [address] [facility] [address]  Telephone: [phone]     Wound Care Orders:  Bilateral lower legs :Cleanse with soap and water, rinse, dry thoroughly. Apply Farrow wrap   Pt./pcg/HH nurse to change (freq) Every other day  and as needed for compromise. No further follow-up. Treatment Orders:   Elevate leg(s) above the level of the heart when sitting, if swelling present. Avoid prolonged standing in one place. Wear off-loading shoe/boot on the affected foot when walking. Do not get dressing/cast wet. Do not use objects to scratch inside cast/wrap. Follow diet as prescribed:  [x] Diet as tolerated: [] Diabetic Diet: Low carb no sugar [x] No Added Salt:  [] Increase Protein: [] Other:Limit the amount of liquid you are drinking and avoid drinking in between meals             Follow-up:  [x] Return Appointment: No further follow-up needed. [] Ordered tests:    Electronically signed Iram Lozoya Irish Vazns on 5/15/2023 at 10:31 AM     [address] [place] [address] Information: Should you experience any significant changes in your wound(s) or have questions about your wound care, please contact the [facility] at [address] [place] [address] 8:00 am - 4:30. If you need help with your wound outside these hours and cannot wait until we are again available, contact your PCP or go to the hospital emergency room. PLEASE NOTE: IF YOU ARE UNABLE TO OBTAIN WOUND SUPPLIES, CONTINUE TO USE THE SUPPLIES YOU HAVE AVAILABLE UNTIL YOU ARE ABLE TO REACH US. IT IS MOST IMPORTANT TO KEEP THE WOUND COVERED AT ALL TIMES.      Physician Signature:_______________________    Date: ___________ Time:  ____________

## 2023-05-15 NOTE — PROGRESS NOTES
HISTORY OF PRESENT ILLNESS:  The patient is an 40-year-old woman who presents to the wound care center regarding ulcerations on both lower legs. The patient was first seen at the [address] [place] [address] on 2/15/2023. The patient was last seen at the wound care center on 2/22/2023. She returned on 5/1/2023. The patient is not sure when she developed these ulcerations. She was referred here by her nurse practitioner. The patient has been treated with  Edmund augustin. The patient has developed a large amount of drainage from her legs. The legs are painful  in the area of the ulcerations. The patient reports that she can stand and walk with a walker. Observation by the nurses in the wound care center indicated the patient has very limited mobility. The patient lives by herself. The patient has history of prediabetes. She does not report cardiac symptoms. There is no history of MI or coronary intervention. The patient denies shortness of breath. The patient had been sleeping in a lift chair in a reclined position with her feet substantially lower than her heart. She now is sleeping  in a hospital bed. The patient did not take a diuretic as of 2/15/2023. By description, she drinks multiple  quarts of water per day. I sent message to her primary [place] [facility] LAURIE Muñoz; on 2/16/2023 she started her on Lasix 40 mg po daily and gave a referral to Dr. Rj Smith, nephrology. The patient reports frequently having relatively poor oral intake of food. She also describes eating a relatively high salt diet. She lives alone and prepares her own food. Past medical history includes chronic kidney disease stage IV, hypercholesterolemia, hypertension. The patient has never smoked. The patient was hospitalized 3/9/2023 through 3/15/2023 after a fall. She was found to have acute kidney injury. While in the hospital she received IV antibiotics.   JANI was noted to be normal.  The

## 2023-05-15 NOTE — FLOWSHEET NOTE
05/15/23 1009   RLE Neurovascular Assessment   Capillary Refill Less than/Equal to 3 seconds   Color Appropriate for Ethnicity   Temperature Warm   RLE Sensation  Decreased   R Pedal Pulse +2   LLE Neurovascular Assessment   Capillary Refill Less than/Equal to 3 seconds   Color Appropriate for Ethnicity   Temperature Warm   L Pedal Pulse +2   Wound 05/01/23 Leg Right; Lower Cluster   Date First Assessed/Time First Assessed: 05/01/23 1108   Present on Hospital Admission: Yes  Wound Approximate Age at First Assessment (Weeks): 100 weeks  Primary Wound Type: Venous Ulcer  Location: Leg  Wound Location Orientation: Right; Lower  Wound . .. Wound Image    Wound Etiology Venous   Dressing Status Intact   Wound Cleansed Cleansed with saline   Dressing/Treatment   (Gauze, roll gauze)   Wound Length (cm) 0 cm   Wound Width (cm) 0 cm   Wound Depth (cm) 0 cm   Wound Surface Area (cm^2) 0 cm^2   Wound Volume (cm^3) 0 cm^3   Wound Assessment Epithelialization   Drainage Amount None   Odor None   Anni-wound Assessment Intact   Margins Attached edges   Wound Thickness Description not for Pressure Injury Full thickness   Wound 05/01/23 Leg Left; Lower Cluster   Date First Assessed/Time First Assessed: 05/01/23 1110   Present on Hospital Admission: Yes  Wound Approximate Age at First Assessment (Weeks): 100 weeks  Primary Wound Type: Venous Ulcer  Location: Leg  Wound Location Orientation: Left; Lower  Wound D...    Wound Image    Wound Etiology Venous   Dressing Status Intact   Wound Cleansed Cleansed with saline   Dressing/Treatment   (Gauze, roll gauze)   Wound Assessment Epithelialization   Drainage Amount None   Odor None   Anni-wound Assessment Intact   Margins Attached edges   Wound Thickness Description not for Pressure Injury Full thickness   Pain Assessment   Pain Assessment None - Denies Pain     BP (!) 214/84   Pulse 63   Temp 97.4 °F (36.3 °C) (Temporal)   Resp 16

## 2023-06-01 ENCOUNTER — NURSE ONLY (OUTPATIENT)
Age: 88
End: 2023-06-01
Payer: MEDICARE

## 2023-06-01 DIAGNOSIS — L97.922 NON-PRESSURE CHRONIC ULCER OF UNSPECIFIED PART OF LEFT LOWER LEG WITH FAT LAYER EXPOSED (HCC): Primary | ICD-10-CM

## 2023-06-01 DIAGNOSIS — I87.2 VENOUS INSUFFICIENCY (CHRONIC) (PERIPHERAL): ICD-10-CM

## 2023-06-01 DIAGNOSIS — N18.4 CHRONIC KIDNEY DISEASE, STAGE 4 (SEVERE) (HCC): ICD-10-CM

## 2023-06-01 PROCEDURE — 36415 COLL VENOUS BLD VENIPUNCTURE: CPT | Performed by: NURSE PRACTITIONER

## 2023-06-02 LAB
ALBUMIN SERPL-MCNC: 3.4 G/DL (ref 3.5–5)
ALBUMIN/GLOB SERPL: 0.7 (ref 1.1–2.2)
ALP SERPL-CCNC: 133 U/L (ref 45–117)
ALT SERPL-CCNC: 17 U/L (ref 12–78)
ANION GAP SERPL CALC-SCNC: 12 MMOL/L (ref 5–15)
ANION GAP SERPL CALC-SCNC: 13 MMOL/L (ref 5–15)
AST SERPL-CCNC: 24 U/L (ref 15–37)
BASOPHILS # BLD: 0.1 K/UL (ref 0–0.1)
BASOPHILS NFR BLD: 1 % (ref 0–1)
BILIRUB SERPL-MCNC: 1.4 MG/DL (ref 0.2–1)
BUN SERPL-MCNC: 39 MG/DL (ref 6–20)
BUN/CREAT SERPL: 15 (ref 12–20)
CALCIUM SERPL-MCNC: 9.7 MG/DL (ref 8.5–10.1)
CHLORIDE SERPL-SCNC: 107 MMOL/L (ref 97–108)
CHLORIDE SERPL-SCNC: 107 MMOL/L (ref 97–108)
CO2 SERPL-SCNC: 22 MMOL/L (ref 21–32)
CO2 SERPL-SCNC: 22 MMOL/L (ref 21–32)
CREAT SERPL-MCNC: 2.65 MG/DL (ref 0.55–1.02)
DIFFERENTIAL METHOD BLD: ABNORMAL
EOSINOPHIL # BLD: 0.1 K/UL (ref 0–0.4)
EOSINOPHIL NFR BLD: 1 % (ref 0–7)
ERYTHROCYTE [DISTWIDTH] IN BLOOD BY AUTOMATED COUNT: 16.4 % (ref 11.5–14.5)
GLOBULIN SER CALC-MCNC: 4.9 G/DL (ref 2–4)
GLUCOSE SERPL-MCNC: 88 MG/DL (ref 65–100)
HCT VFR BLD AUTO: 37.1 % (ref 35–47)
HGB BLD-MCNC: 11 G/DL (ref 11.5–16)
IMM GRANULOCYTES # BLD AUTO: 0 K/UL (ref 0–0.04)
IMM GRANULOCYTES NFR BLD AUTO: 0 % (ref 0–0.5)
LYMPHOCYTES # BLD: 1.4 K/UL (ref 0.8–3.5)
LYMPHOCYTES NFR BLD: 18 % (ref 12–49)
MCH RBC QN AUTO: 30 PG (ref 26–34)
MCHC RBC AUTO-ENTMCNC: 29.6 G/DL (ref 30–36.5)
MCV RBC AUTO: 101.1 FL (ref 80–99)
MONOCYTES # BLD: 1 K/UL (ref 0–1)
MONOCYTES NFR BLD: 13 % (ref 5–13)
NEUTS SEG # BLD: 5.3 K/UL (ref 1.8–8)
NEUTS SEG NFR BLD: 67 % (ref 32–75)
NRBC # BLD: 0 K/UL (ref 0–0.01)
NRBC BLD-RTO: 0 PER 100 WBC
PLATELET # BLD AUTO: 159 K/UL (ref 150–400)
PMV BLD AUTO: 13 FL (ref 8.9–12.9)
POTASSIUM SERPL-SCNC: 4.7 MMOL/L (ref 3.5–5.1)
POTASSIUM SERPL-SCNC: 4.8 MMOL/L (ref 3.5–5.1)
PROT SERPL-MCNC: 8.3 G/DL (ref 6.4–8.2)
RBC # BLD AUTO: 3.67 M/UL (ref 3.8–5.2)
SODIUM SERPL-SCNC: 141 MMOL/L (ref 136–145)
SODIUM SERPL-SCNC: 142 MMOL/L (ref 136–145)
WBC # BLD AUTO: 7.8 K/UL (ref 3.6–11)

## 2023-08-04 ENCOUNTER — OFFICE VISIT (OUTPATIENT)
Age: 88
End: 2023-08-04
Payer: MEDICARE

## 2023-08-04 VITALS
SYSTOLIC BLOOD PRESSURE: 160 MMHG | DIASTOLIC BLOOD PRESSURE: 62 MMHG | WEIGHT: 190.4 LBS | RESPIRATION RATE: 16 BRPM | OXYGEN SATURATION: 100 % | BODY MASS INDEX: 32.5 KG/M2 | HEART RATE: 70 BPM | HEIGHT: 64 IN

## 2023-08-04 DIAGNOSIS — I87.2 VENOUS INSUFFICIENCY (CHRONIC) (PERIPHERAL): Primary | ICD-10-CM

## 2023-08-04 DIAGNOSIS — I87.2 EDEMA OF BOTH LOWER EXTREMITIES DUE TO PERIPHERAL VENOUS INSUFFICIENCY: ICD-10-CM

## 2023-08-04 DIAGNOSIS — I10 ESSENTIAL (PRIMARY) HYPERTENSION: ICD-10-CM

## 2023-08-04 DIAGNOSIS — R63.5 WEIGHT GAIN: ICD-10-CM

## 2023-08-04 PROCEDURE — 1123F ACP DISCUSS/DSCN MKR DOCD: CPT

## 2023-08-04 PROCEDURE — 99214 OFFICE O/P EST MOD 30 MIN: CPT

## 2023-08-04 SDOH — ECONOMIC STABILITY: TRANSPORTATION INSECURITY
IN THE PAST 12 MONTHS, HAS LACK OF TRANSPORTATION KEPT YOU FROM MEETINGS, WORK, OR FROM GETTING THINGS NEEDED FOR DAILY LIVING?: NO

## 2023-08-04 SDOH — ECONOMIC STABILITY: FOOD INSECURITY: WITHIN THE PAST 12 MONTHS, THE FOOD YOU BOUGHT JUST DIDN'T LAST AND YOU DIDN'T HAVE MONEY TO GET MORE.: NEVER TRUE

## 2023-08-04 SDOH — ECONOMIC STABILITY: INCOME INSECURITY: IN THE LAST 12 MONTHS, WAS THERE A TIME WHEN YOU WERE NOT ABLE TO PAY THE MORTGAGE OR RENT ON TIME?: NO

## 2023-08-04 SDOH — ECONOMIC STABILITY: TRANSPORTATION INSECURITY
IN THE PAST 12 MONTHS, HAS THE LACK OF TRANSPORTATION KEPT YOU FROM MEDICAL APPOINTMENTS OR FROM GETTING MEDICATIONS?: NO

## 2023-08-04 SDOH — ECONOMIC STABILITY: HOUSING INSECURITY
IN THE LAST 12 MONTHS, WAS THERE A TIME WHEN YOU DID NOT HAVE A STEADY PLACE TO SLEEP OR SLEPT IN A SHELTER (INCLUDING NOW)?: NO

## 2023-08-04 SDOH — ECONOMIC STABILITY: FOOD INSECURITY: WITHIN THE PAST 12 MONTHS, YOU WORRIED THAT YOUR FOOD WOULD RUN OUT BEFORE YOU GOT MONEY TO BUY MORE.: NEVER TRUE

## 2023-08-04 ASSESSMENT — PATIENT HEALTH QUESTIONNAIRE - PHQ9
SUM OF ALL RESPONSES TO PHQ QUESTIONS 1-9: 0
SUM OF ALL RESPONSES TO PHQ QUESTIONS 1-9: 0
1. LITTLE INTEREST OR PLEASURE IN DOING THINGS: 0
SUM OF ALL RESPONSES TO PHQ QUESTIONS 1-9: 0
SUM OF ALL RESPONSES TO PHQ QUESTIONS 1-9: 0

## 2023-08-04 ASSESSMENT — ENCOUNTER SYMPTOMS
BLOOD IN STOOL: 0
DIARRHEA: 0
ALLERGIC/IMMUNOLOGIC NEGATIVE: 1
WHEEZING: 0
COUGH: 0
CONSTIPATION: 0
SHORTNESS OF BREATH: 1
EYES NEGATIVE: 1

## 2023-08-04 NOTE — PROGRESS NOTES
1. \"Have you been to the ER, urgent care clinic since your last visit? Hospitalized since your last visit? \" No    2. \"Have you seen or consulted any other health care providers outside of the [address] [place] [address] since your last visit? \" No     3. For patients aged 43-73: Has the patient had a colonoscopy / FIT/ Cologuard? NA - based on age     If the patient is female:    4. For patients aged 43-66: Has the patient had a mammogram within the past 2 years? NA - based on age    11. For patients aged 21-65: Has the patient had a pap smear?  NA - based on age    Chief Complaint   Patient presents with    Shortness of Breath    Leg Swelling     fluid       Vitals:    08/04/23 1118   BP: (!) 160/62   Pulse: 70   Resp: 16   SpO2: 100%

## 2023-08-04 NOTE — PROGRESS NOTES
Chief Complaint   Patient presents with    Shortness of Breath    Leg Swelling     fluid       HPI:     is a 80 y.o. female who presents for an acute visit. She notes increased swelling in her legs over the last few months; was receiving home healthy therapy for chronic stasis ulcers of both legs, but these improved and home health was discontinued about three months ago. Initially, she continued to wear rasheed boots with a friends helping to change these every other day; over the last couple of months, she stopped using these and was using compression hose only. She noted an increase in swelling and once again began to notice open, weeping areas on her lower legs. She admits that she does not keep her legs elevated, usually sits in a recliner during the day but doesn't recline; she admits that she does not taker medications consistently, sometimes waits until the afternoon to take her morning medicines. Caregiver has once again started to apply rasheed boots and notes that the swelling is greatly reduced since doing so. No Known Allergies    Current Outpatient Medications   Medication Sig Dispense Refill    amLODIPine (NORVASC) 5 MG tablet Take 1 tablet by mouth daily      ammonium lactate (LAC-HYDRIN) 12 % lotion Apply 1 g topically as needed for Dry Skin Apply topically as needed. To affected area. Multiple Vitamins-Minerals (THERAPEUTIC MULTIVITAMIN-MINERALS) tablet Take 1 tablet by mouth daily      ondansetron (ZOFRAN) 4 MG tablet Take 1 tablet by mouth every 8 hours as needed for Nausea or Vomiting Disintegrating tablet      acetaminophen (TYLENOL) 500 MG tablet Take 1 tablet by mouth every 6 hours as needed      furosemide (LASIX) 40 MG tablet Take 1 tablet by mouth daily      metoprolol tartrate (LOPRESSOR) 50 MG tablet Take 1 tablet by mouth 2 times daily       No current facility-administered medications for this visit.        Past Medical History:   Diagnosis Date    Chronic kidney

## 2023-08-09 ENCOUNTER — HOSPITAL ENCOUNTER (EMERGENCY)
Facility: HOSPITAL | Age: 88
Discharge: ANOTHER HOSPITAL | End: 2023-08-10
Attending: EMERGENCY MEDICINE
Payer: MEDICARE

## 2023-08-09 ENCOUNTER — APPOINTMENT (OUTPATIENT)
Facility: HOSPITAL | Age: 88
End: 2023-08-09
Payer: MEDICARE

## 2023-08-09 DIAGNOSIS — N18.9 ACUTE KIDNEY INJURY SUPERIMPOSED ON CKD (HCC): Primary | ICD-10-CM

## 2023-08-09 DIAGNOSIS — R60.0 BILATERAL LOWER EXTREMITY EDEMA: ICD-10-CM

## 2023-08-09 DIAGNOSIS — N17.9 ACUTE KIDNEY INJURY SUPERIMPOSED ON CKD (HCC): Primary | ICD-10-CM

## 2023-08-09 LAB
ALBUMIN SERPL-MCNC: 3.5 G/DL (ref 3.5–5)
ALBUMIN/GLOB SERPL: 0.7 (ref 1.1–2.2)
ALP SERPL-CCNC: 133 U/L (ref 45–117)
ALT SERPL-CCNC: 19 U/L (ref 12–78)
ANION GAP SERPL CALC-SCNC: 15 MMOL/L (ref 5–15)
AST SERPL-CCNC: 41 U/L (ref 15–37)
BASOPHILS # BLD: 0 K/UL (ref 0–0.1)
BASOPHILS NFR BLD: 1 % (ref 0–1)
BILIRUB SERPL-MCNC: 1.8 MG/DL (ref 0.2–1)
BUN SERPL-MCNC: 77 MG/DL (ref 6–20)
BUN/CREAT SERPL: 23 (ref 12–20)
CALCIUM SERPL-MCNC: 9.5 MG/DL (ref 8.5–10.1)
CHLORIDE SERPL-SCNC: 103 MMOL/L (ref 97–108)
CO2 SERPL-SCNC: 23 MMOL/L (ref 21–32)
CREAT SERPL-MCNC: 3.37 MG/DL (ref 0.55–1.02)
DIFFERENTIAL METHOD BLD: ABNORMAL
EOSINOPHIL # BLD: 0.1 K/UL (ref 0–0.4)
EOSINOPHIL NFR BLD: 2 % (ref 0–7)
ERYTHROCYTE [DISTWIDTH] IN BLOOD BY AUTOMATED COUNT: 19.7 % (ref 11.5–14.5)
GLOBULIN SER CALC-MCNC: 5.1 G/DL (ref 2–4)
GLUCOSE SERPL-MCNC: 116 MG/DL (ref 65–100)
HCT VFR BLD AUTO: 35.2 % (ref 35–47)
HGB BLD-MCNC: 10.9 G/DL (ref 11.5–16)
IMM GRANULOCYTES # BLD AUTO: 0 K/UL (ref 0–0.04)
IMM GRANULOCYTES NFR BLD AUTO: 0 % (ref 0–0.5)
LYMPHOCYTES # BLD: 1.1 K/UL (ref 0.8–3.5)
LYMPHOCYTES NFR BLD: 19 % (ref 12–49)
MCH RBC QN AUTO: 28.5 PG (ref 26–34)
MCHC RBC AUTO-ENTMCNC: 31 G/DL (ref 30–36.5)
MCV RBC AUTO: 92.1 FL (ref 80–99)
MONOCYTES # BLD: 0.9 K/UL (ref 0–1)
MONOCYTES NFR BLD: 15 % (ref 5–13)
NEUTS SEG # BLD: 3.8 K/UL (ref 1.8–8)
NEUTS SEG NFR BLD: 63 % (ref 32–75)
NRBC # BLD: 0 K/UL (ref 0–0.01)
NRBC BLD-RTO: 0 PER 100 WBC
NT PRO BNP: 4678 PG/ML (ref 0–450)
PLATELET # BLD AUTO: 148 K/UL (ref 150–400)
PMV BLD AUTO: 12.4 FL (ref 8.9–12.9)
POTASSIUM SERPL-SCNC: 4.3 MMOL/L (ref 3.5–5.1)
POTASSIUM SERPL-SCNC: 5.3 MMOL/L (ref 3.5–5.1)
PROT SERPL-MCNC: 8.6 G/DL (ref 6.4–8.2)
RBC # BLD AUTO: 3.82 M/UL (ref 3.8–5.2)
SODIUM SERPL-SCNC: 141 MMOL/L (ref 136–145)
WBC # BLD AUTO: 6 K/UL (ref 3.6–11)

## 2023-08-09 PROCEDURE — 96361 HYDRATE IV INFUSION ADD-ON: CPT

## 2023-08-09 PROCEDURE — 85025 COMPLETE CBC W/AUTO DIFF WBC: CPT

## 2023-08-09 PROCEDURE — 73562 X-RAY EXAM OF KNEE 3: CPT

## 2023-08-09 PROCEDURE — 71045 X-RAY EXAM CHEST 1 VIEW: CPT

## 2023-08-09 PROCEDURE — 83880 ASSAY OF NATRIURETIC PEPTIDE: CPT

## 2023-08-09 PROCEDURE — 6360000002 HC RX W HCPCS: Performed by: EMERGENCY MEDICINE

## 2023-08-09 PROCEDURE — 93005 ELECTROCARDIOGRAM TRACING: CPT | Performed by: EMERGENCY MEDICINE

## 2023-08-09 PROCEDURE — 99285 EMERGENCY DEPT VISIT HI MDM: CPT

## 2023-08-09 PROCEDURE — 6370000000 HC RX 637 (ALT 250 FOR IP): Performed by: EMERGENCY MEDICINE

## 2023-08-09 PROCEDURE — 36415 COLL VENOUS BLD VENIPUNCTURE: CPT

## 2023-08-09 PROCEDURE — 96374 THER/PROPH/DIAG INJ IV PUSH: CPT

## 2023-08-09 PROCEDURE — 80053 COMPREHEN METABOLIC PANEL: CPT

## 2023-08-09 PROCEDURE — 2580000003 HC RX 258: Performed by: EMERGENCY MEDICINE

## 2023-08-09 PROCEDURE — 84132 ASSAY OF SERUM POTASSIUM: CPT

## 2023-08-09 RX ORDER — FUROSEMIDE 10 MG/ML
40 INJECTION INTRAMUSCULAR; INTRAVENOUS
Status: COMPLETED | OUTPATIENT
Start: 2023-08-09 | End: 2023-08-09

## 2023-08-09 RX ORDER — 0.9 % SODIUM CHLORIDE 0.9 %
1000 INTRAVENOUS SOLUTION INTRAVENOUS ONCE
Status: COMPLETED | OUTPATIENT
Start: 2023-08-09 | End: 2023-08-10

## 2023-08-09 RX ORDER — ACETAMINOPHEN 500 MG
1000 TABLET ORAL
Status: COMPLETED | OUTPATIENT
Start: 2023-08-09 | End: 2023-08-09

## 2023-08-09 RX ADMIN — FUROSEMIDE 40 MG: 10 INJECTION, SOLUTION INTRAMUSCULAR; INTRAVENOUS at 20:38

## 2023-08-09 RX ADMIN — SODIUM CHLORIDE 1000 ML: 9 INJECTION, SOLUTION INTRAVENOUS at 22:00

## 2023-08-09 RX ADMIN — ACETAMINOPHEN 1000 MG: 500 TABLET ORAL at 20:39

## 2023-08-09 ASSESSMENT — PAIN - FUNCTIONAL ASSESSMENT
PAIN_FUNCTIONAL_ASSESSMENT: 0-10
PAIN_FUNCTIONAL_ASSESSMENT: PREVENTS OR INTERFERES SOME ACTIVE ACTIVITIES AND ADLS

## 2023-08-09 ASSESSMENT — PAIN DESCRIPTION - FREQUENCY: FREQUENCY: CONTINUOUS

## 2023-08-09 ASSESSMENT — LIFESTYLE VARIABLES
HOW OFTEN DO YOU HAVE A DRINK CONTAINING ALCOHOL: NEVER
HOW MANY STANDARD DRINKS CONTAINING ALCOHOL DO YOU HAVE ON A TYPICAL DAY: PATIENT DOES NOT DRINK

## 2023-08-09 ASSESSMENT — PAIN DESCRIPTION - LOCATION: LOCATION: KNEE

## 2023-08-09 ASSESSMENT — PAIN DESCRIPTION - PAIN TYPE: TYPE: ACUTE PAIN;CHRONIC PAIN

## 2023-08-09 ASSESSMENT — PAIN DESCRIPTION - DESCRIPTORS: DESCRIPTORS: ACHING

## 2023-08-09 ASSESSMENT — ENCOUNTER SYMPTOMS
COLOR CHANGE: 1
ABDOMINAL PAIN: 0
SHORTNESS OF BREATH: 0
BACK PAIN: 0

## 2023-08-09 ASSESSMENT — PAIN DESCRIPTION - ORIENTATION: ORIENTATION: RIGHT

## 2023-08-09 ASSESSMENT — PAIN DESCRIPTION - ONSET: ONSET: ON-GOING

## 2023-08-09 ASSESSMENT — PAIN SCALES - GENERAL: PAINLEVEL_OUTOF10: 10

## 2023-08-09 NOTE — ED NOTES
Received report, to assume care when available. Per report pt vitals stable, issue is ongoing.      Iva Mcintosh RN  28/16/73 5221

## 2023-08-10 ENCOUNTER — HOSPITAL ENCOUNTER (INPATIENT)
Facility: HOSPITAL | Age: 88
LOS: 5 days | Discharge: NURSING, REHAB OR LONG-TERM CARE | DRG: 683 | End: 2023-08-16
Attending: EMERGENCY MEDICINE | Admitting: INTERNAL MEDICINE
Payer: MEDICARE

## 2023-08-10 ENCOUNTER — APPOINTMENT (OUTPATIENT)
Facility: HOSPITAL | Age: 88
DRG: 683 | End: 2023-08-10
Payer: MEDICARE

## 2023-08-10 VITALS
HEIGHT: 64 IN | WEIGHT: 195.9 LBS | BODY MASS INDEX: 33.44 KG/M2 | HEART RATE: 46 BPM | SYSTOLIC BLOOD PRESSURE: 158 MMHG | RESPIRATION RATE: 11 BRPM | TEMPERATURE: 98.1 F | OXYGEN SATURATION: 91 % | DIASTOLIC BLOOD PRESSURE: 60 MMHG

## 2023-08-10 DIAGNOSIS — N17.9 ACUTE RENAL FAILURE, UNSPECIFIED ACUTE RENAL FAILURE TYPE (HCC): Primary | ICD-10-CM

## 2023-08-10 DIAGNOSIS — R00.1 BRADYCARDIA: ICD-10-CM

## 2023-08-10 DIAGNOSIS — I89.0 LYMPHEDEMA: ICD-10-CM

## 2023-08-10 LAB
ANION GAP SERPL CALC-SCNC: 10 MMOL/L (ref 5–15)
BUN SERPL-MCNC: 71 MG/DL (ref 6–20)
BUN/CREAT SERPL: 21 (ref 12–20)
CALCIUM SERPL-MCNC: 9.7 MG/DL (ref 8.5–10.1)
CHLORIDE SERPL-SCNC: 107 MMOL/L (ref 97–108)
CO2 SERPL-SCNC: 26 MMOL/L (ref 21–32)
CREAT SERPL-MCNC: 3.44 MG/DL (ref 0.55–1.02)
EKG ATRIAL RATE: 45 BPM
EKG DIAGNOSIS: NORMAL
EKG P AXIS: 62 DEGREES
EKG P-R INTERVAL: 170 MS
EKG Q-T INTERVAL: 522 MS
EKG QRS DURATION: 92 MS
EKG QTC CALCULATION (BAZETT): 451 MS
EKG R AXIS: -32 DEGREES
EKG T AXIS: 8 DEGREES
EKG VENTRICULAR RATE: 45 BPM
GLUCOSE SERPL-MCNC: 93 MG/DL (ref 65–100)
MAGNESIUM SERPL-MCNC: 2.1 MG/DL (ref 1.6–2.4)
POTASSIUM SERPL-SCNC: 4.4 MMOL/L (ref 3.5–5.1)
SODIUM SERPL-SCNC: 143 MMOL/L (ref 136–145)

## 2023-08-10 PROCEDURE — G0378 HOSPITAL OBSERVATION PER HR: HCPCS

## 2023-08-10 PROCEDURE — 93970 EXTREMITY STUDY: CPT

## 2023-08-10 PROCEDURE — 96374 THER/PROPH/DIAG INJ IV PUSH: CPT

## 2023-08-10 PROCEDURE — 76770 US EXAM ABDO BACK WALL COMP: CPT

## 2023-08-10 PROCEDURE — 6360000002 HC RX W HCPCS: Performed by: INTERNAL MEDICINE

## 2023-08-10 PROCEDURE — 6370000000 HC RX 637 (ALT 250 FOR IP): Performed by: INTERNAL MEDICINE

## 2023-08-10 PROCEDURE — 36415 COLL VENOUS BLD VENIPUNCTURE: CPT

## 2023-08-10 PROCEDURE — 2580000003 HC RX 258: Performed by: EMERGENCY MEDICINE

## 2023-08-10 PROCEDURE — 83735 ASSAY OF MAGNESIUM: CPT

## 2023-08-10 PROCEDURE — 99285 EMERGENCY DEPT VISIT HI MDM: CPT

## 2023-08-10 PROCEDURE — 80048 BASIC METABOLIC PNL TOTAL CA: CPT

## 2023-08-10 RX ORDER — OXYCODONE HYDROCHLORIDE 5 MG/1
2.5 TABLET ORAL EVERY 4 HOURS PRN
Status: DISCONTINUED | OUTPATIENT
Start: 2023-08-10 | End: 2023-08-17 | Stop reason: HOSPADM

## 2023-08-10 RX ORDER — ACETAMINOPHEN 325 MG/1
650 TABLET ORAL EVERY 6 HOURS PRN
Status: DISCONTINUED | OUTPATIENT
Start: 2023-08-10 | End: 2023-08-17 | Stop reason: HOSPADM

## 2023-08-10 RX ORDER — AMLODIPINE BESYLATE 5 MG/1
5 TABLET ORAL DAILY
Status: DISCONTINUED | OUTPATIENT
Start: 2023-08-10 | End: 2023-08-11

## 2023-08-10 RX ORDER — POLYETHYLENE GLYCOL 3350 17 G/17G
17 POWDER, FOR SOLUTION ORAL DAILY PRN
Status: DISCONTINUED | OUTPATIENT
Start: 2023-08-10 | End: 2023-08-17 | Stop reason: HOSPADM

## 2023-08-10 RX ORDER — SODIUM CHLORIDE 0.9 % (FLUSH) 0.9 %
5-40 SYRINGE (ML) INJECTION EVERY 12 HOURS SCHEDULED
Status: DISCONTINUED | OUTPATIENT
Start: 2023-08-10 | End: 2023-08-17 | Stop reason: HOSPADM

## 2023-08-10 RX ORDER — ONDANSETRON 4 MG/1
4 TABLET, ORALLY DISINTEGRATING ORAL EVERY 8 HOURS PRN
Status: DISCONTINUED | OUTPATIENT
Start: 2023-08-10 | End: 2023-08-17 | Stop reason: HOSPADM

## 2023-08-10 RX ORDER — METOPROLOL TARTRATE 50 MG/1
50 TABLET, FILM COATED ORAL 2 TIMES DAILY
Status: DISCONTINUED | OUTPATIENT
Start: 2023-08-10 | End: 2023-08-17 | Stop reason: HOSPADM

## 2023-08-10 RX ORDER — COLCHICINE 0.6 MG/1
1.2 TABLET ORAL ONCE
Status: COMPLETED | OUTPATIENT
Start: 2023-08-10 | End: 2023-08-10

## 2023-08-10 RX ORDER — SODIUM CHLORIDE 9 MG/ML
INJECTION, SOLUTION INTRAVENOUS PRN
Status: DISCONTINUED | OUTPATIENT
Start: 2023-08-10 | End: 2023-08-17 | Stop reason: HOSPADM

## 2023-08-10 RX ORDER — SODIUM CHLORIDE 0.9 % (FLUSH) 0.9 %
5-40 SYRINGE (ML) INJECTION PRN
Status: DISCONTINUED | OUTPATIENT
Start: 2023-08-10 | End: 2023-08-17 | Stop reason: HOSPADM

## 2023-08-10 RX ORDER — ONDANSETRON 2 MG/ML
4 INJECTION INTRAMUSCULAR; INTRAVENOUS EVERY 6 HOURS PRN
Status: DISCONTINUED | OUTPATIENT
Start: 2023-08-10 | End: 2023-08-17 | Stop reason: HOSPADM

## 2023-08-10 RX ORDER — ACETAMINOPHEN 650 MG/1
650 SUPPOSITORY RECTAL EVERY 6 HOURS PRN
Status: DISCONTINUED | OUTPATIENT
Start: 2023-08-10 | End: 2023-08-17 | Stop reason: HOSPADM

## 2023-08-10 RX ORDER — SODIUM CHLORIDE 9 MG/ML
INJECTION, SOLUTION INTRAVENOUS CONTINUOUS
Status: DISCONTINUED | OUTPATIENT
Start: 2023-08-10 | End: 2023-08-10 | Stop reason: HOSPADM

## 2023-08-10 RX ADMIN — METOPROLOL TARTRATE 50 MG: 50 TABLET ORAL at 16:04

## 2023-08-10 RX ADMIN — OXYCODONE HYDROCHLORIDE 2.5 MG: 5 TABLET ORAL at 22:31

## 2023-08-10 RX ADMIN — AMLODIPINE BESYLATE 5 MG: 5 TABLET ORAL at 16:05

## 2023-08-10 RX ADMIN — COLCHICINE 1.2 MG: 0.6 TABLET, FILM COATED ORAL at 16:12

## 2023-08-10 RX ADMIN — OXYCODONE HYDROCHLORIDE 2.5 MG: 5 TABLET ORAL at 16:12

## 2023-08-10 RX ADMIN — ONDANSETRON HYDROCHLORIDE 4 MG: 2 SOLUTION INTRAMUSCULAR; INTRAVENOUS at 16:04

## 2023-08-10 RX ADMIN — SODIUM CHLORIDE: 9 INJECTION, SOLUTION INTRAVENOUS at 05:00

## 2023-08-10 ASSESSMENT — PAIN DESCRIPTION - DESCRIPTORS: DESCRIPTORS: ACHING

## 2023-08-10 ASSESSMENT — PAIN - FUNCTIONAL ASSESSMENT
PAIN_FUNCTIONAL_ASSESSMENT: 0-10
PAIN_FUNCTIONAL_ASSESSMENT: PREVENTS OR INTERFERES SOME ACTIVE ACTIVITIES AND ADLS
PAIN_FUNCTIONAL_ASSESSMENT: PREVENTS OR INTERFERES SOME ACTIVE ACTIVITIES AND ADLS

## 2023-08-10 ASSESSMENT — PAIN SCALES - GENERAL
PAINLEVEL_OUTOF10: 10
PAINLEVEL_OUTOF10: 0
PAINLEVEL_OUTOF10: 10
PAINLEVEL_OUTOF10: 0
PAINLEVEL_OUTOF10: 0

## 2023-08-10 ASSESSMENT — PAIN DESCRIPTION - LOCATION
LOCATION: LEG
LOCATION: LEG

## 2023-08-10 ASSESSMENT — PAIN DESCRIPTION - PAIN TYPE: TYPE: ACUTE PAIN;CHRONIC PAIN

## 2023-08-10 ASSESSMENT — PAIN SCALES - WONG BAKER: WONGBAKER_NUMERICALRESPONSE: 0

## 2023-08-10 ASSESSMENT — PAIN DESCRIPTION - ORIENTATION
ORIENTATION: LEFT
ORIENTATION: LEFT;RIGHT

## 2023-08-10 NOTE — ED NOTES
Assisted Rad tech with x-ray, pt unable to sit up or move in bed on her own, responding verbally but not opening eyes.      Fela Collier RN  15/95/18 2015

## 2023-08-10 NOTE — ED PROVIDER NOTES
hospitals EMERGENCY DEPT  EMERGENCY DEPARTMENT ENCOUNTER       Pt Name: Lise Garcia  MRN: [id number]  [address] [facility] 7/24/1933  Date of evaluation: 8/10/2023  Provider: Charlene Dias MD   PCP: AI Zhang NP  Note Started: 1:14 PM EDT 8/10/23     CHIEF COMPLAINT       Chief Complaint   Patient presents with    Other     Arrives with EMS; transfer from [facility] for CLAIRE; hx of CKD not on HD. Denies any chest pain; arrives on 2 L NC for reported hypoxia. HISTORY OF PRESENT ILLNESS: 1 or more elements      History From: patient, History limited by: none     Lise Garcia is a 80 y.o. female with past medical history of CKD, hypertension, lymphedema presents emergency department as a transfer of care. Patient reports bilateral leg swelling, improved from when patient was in wound care. Denies fevers or chills or infectious symptoms. Was seen at her PCP for increased shortness of breath and leg swelling, Bumex was increased. Went to outside hospital for continued leg swelling. There labs were checked, these were notable for acute on chronic kidney disease. Patient was also hypokalemic. Patient is given Lasix there and after discussion with hospitalist at [facility], transferred for nephrology and further workup. On repeat labs creatinine improved somewhat as well as potassium. On my assessment patient has no complaints. Please See MDM for Additional Details of the HPI/PMH  Nursing Notes were all reviewed and agreed with or any disagreements were addressed in the HPI. REVIEW OF SYSTEMS        Positives and Pertinent negatives as per HPI.     PAST HISTORY     Past Medical History:  Past Medical History:   Diagnosis Date    Chronic kidney disease     Hypercholesterolemia     Hypertension        Past Surgical History:  Past Surgical History:   Procedure Laterality Date    CATARACT REMOVAL  05/2018    left    CATARACT REMOVAL Right 03/27/2018    CYST REMOVAL  1976    ear,       Family

## 2023-08-10 NOTE — ED NOTES
Report given to Marrianne Severs RN at [facility] ED in [address] format     ALANNAH Matute  08/10/23 1642

## 2023-08-10 NOTE — ED NOTES
Contacted [facility] regarding transfer of pt to [address] [place] [address] ED, spoke with [facility] [place], arranged for ALS transport with cardiac monitor, NS @75, no isolation, and with the patient on RA. Insurance information, ht/wt, and primary diagnosis provided. Received a 1000 ETA. Lynn Carrel, ED charge RN made aware of ETA.

## 2023-08-10 NOTE — ED NOTES
Pacer pads applied r/t bradycardia. Observed O2 desaturation during sleep; 2L o2 applied via NC.    Sats currently 99%     Westley Clements RN  08/10/23 5427

## 2023-08-10 NOTE — ED NOTES
Called [facility] twice with no answer. Cannot call [facility] at this time.      Jayy Faulkner RN  08/10/23 1950

## 2023-08-10 NOTE — H&P
Uses a rollator. Sister helps her      Subjective:   CHIEF COMPLAINT: Transferred from outside hospital for CLAIRE    HISTORY OF PRESENT ILLNESS:     Mia Gómez is a 80 y.o. female with a past history of CKD, HTN, chronic lower extremity venous insufficiency who presents as a transfer from [facility] ED for further evaluation and treatment of her CLAIRE. Patient originally presented to [facility] ED because of right knee pain and difficulty ambulating. She denies any recent fall and got up yesterday initially feeling at baseline. By the afternoon she experienced right knee discomfort and had to sit down. From that point she realized that she could not no longer get up and called her sister. [facility] ED evaluation remarkable for CLAIRE with a creatinine of 3.5. X-rays of her right knee did not show any acute fractures. On arrival to the ED [facility] ED, Doppler ultrasound of her lower extremities were completed and were negative for DVT. We were asked to admit for work up and evaluation of the above problems. Past Medical History:   Diagnosis Date    Chronic kidney disease     Hypercholesterolemia     Hypertension         Past Surgical History:   Procedure Laterality Date    CATARACT REMOVAL  05/2018    left    CATARACT REMOVAL Right 03/27/2018    CYST REMOVAL  1976    ear,       Social History     Tobacco Use    Smoking status: Never    Smokeless tobacco: Never   Substance Use Topics    Alcohol use: No        No Known Allergies     Prior to Admission medications    Medication Sig Start Date End Date Taking? Authorizing Provider   amLODIPine (NORVASC) 5 MG tablet Take 1 tablet by mouth daily 6/30/22   Historical Provider, MD   ammonium lactate (LAC-HYDRIN) 12 % lotion Apply 1 g topically as needed for Dry Skin Apply topically as needed. To affected area.     Historical Provider, MD   Multiple Vitamins-Minerals (THERAPEUTIC MULTIVITAMIN-MINERALS) tablet Take 1 tablet by mouth daily    Historical Provider, MD

## 2023-08-10 NOTE — ED TRIAGE NOTES
Pt arrived via EMS from home  with report of right knee pain with swelling to knee. Denies fall or injury. Pt with bilateral pitting edema upper legs down to feet. Denies chest pain or SOB.

## 2023-08-10 NOTE — ED NOTES
Attempted to call and update Yan Sharma (patient's sister) per patient request.   No answer.  Voicemail left with call back number without PMI     Viridiana Clements RN  08/10/23 3315

## 2023-08-10 NOTE — ED PROVIDER NOTES
[facility] EMERGENCY DEP  EMERGENCY DEPARTMENT ENCOUNTER       Pt Name: Sabiha Foster  MRN: [id number]  [address] 7/24/1933  Date of evaluation: 8/9/2023  Provider: Wiley Paulson MD   PCP: AI Krishnan NP  Note Started: 8:42 PM 8/9/23      FINAL IMPRESSION     1. Acute kidney injury superimposed on CKD ([address])    2. Bilateral lower extremity edema          DISPOSITION/PLAN     Disposition:  DISPOSITION        Transfer: The patient is being transferred to [facility] for CLAIRE/neprology. The results of their tests and reasons for their transfer have been discussed with the patient and/or available family. The patient/family has conveyed agreement and understanding for the need to be admitted and for their admission diagnosis. Consultation has been made with Dr Magali Butler, who agrees to accept the transfer. CHIEF COMPLAINT       Chief Complaint   Patient presents with    Knee Pain    Joint Swelling        HISTORY OF PRESENT ILLNESS: 1 or more elements      History From: Patient  None     HPI    Sabiha Foster is a 80 y.o. female who presents patient presents reporting right knee pain, reports she was sitting in a chair today and went to get up and had pain in the knee she denies any trauma injuries or falls. She has chronic lower extremity edema and venous stasis, she is currently on Lasix. She has been seen by her PCP recently who increased her Lasix dose. She does have CKD 4 baseline creatinine around 2. She denies any chest pain shortness of breath notes by her PCP reports she sits in her chair for long periods of time she has been wearing compression stockings. She denies any abdominal pain back pain. There are no other complaints, changes, or physical findings at this time. Nursing Notes were all reviewed and agreed with or any disagreements were addressed in the HPI. REVIEW OF SYSTEMS      Review of Systems   Constitutional:  Negative for chills and fever.    Respiratory:  Negative for shortness

## 2023-08-11 ENCOUNTER — APPOINTMENT (OUTPATIENT)
Facility: HOSPITAL | Age: 88
DRG: 683 | End: 2023-08-11
Attending: INTERNAL MEDICINE
Payer: MEDICARE

## 2023-08-11 PROBLEM — R00.1 BRADYCARDIA: Status: ACTIVE | Noted: 2023-08-11

## 2023-08-11 LAB
ANION GAP SERPL CALC-SCNC: 8 MMOL/L (ref 5–15)
BUN SERPL-MCNC: 64 MG/DL (ref 6–20)
BUN/CREAT SERPL: 20 (ref 12–20)
CALCIUM SERPL-MCNC: 9.8 MG/DL (ref 8.5–10.1)
CHLORIDE SERPL-SCNC: 108 MMOL/L (ref 97–108)
CO2 SERPL-SCNC: 22 MMOL/L (ref 21–32)
CREAT SERPL-MCNC: 3.26 MG/DL (ref 0.55–1.02)
ECHO AO ROOT DIAM: 2.7 CM
ECHO AO ROOT INDEX: 1.39 CM/M2
ECHO AR MAX VEL PISA: 2.9 M/S
ECHO AV AREA PEAK VELOCITY: 3.1 CM2
ECHO AV AREA/BSA PEAK VELOCITY: 1.6 CM2/M2
ECHO AV PEAK GRADIENT: 10 MMHG
ECHO AV PEAK VELOCITY: 1.6 M/S
ECHO AV REGURGITANT PHT: 760.3 MILLISECOND
ECHO AV VELOCITY RATIO: 1
ECHO BSA: 2 M2
ECHO LA DIAMETER INDEX: 2.22 CM/M2
ECHO LA DIAMETER: 4.3 CM
ECHO LA TO AORTIC ROOT RATIO: 1.59
ECHO LV EDV A4C: 81 ML
ECHO LV EDV INDEX A4C: 42 ML/M2
ECHO LV EJECTION FRACTION A4C: 58 %
ECHO LV ESV A4C: 34 ML
ECHO LV ESV INDEX A4C: 18 ML/M2
ECHO LV FRACTIONAL SHORTENING: 32 % (ref 28–44)
ECHO LV INTERNAL DIMENSION DIASTOLE INDEX: 2.27 CM/M2
ECHO LV INTERNAL DIMENSION DIASTOLIC: 4.4 CM (ref 3.9–5.3)
ECHO LV INTERNAL DIMENSION SYSTOLIC INDEX: 1.55 CM/M2
ECHO LV INTERNAL DIMENSION SYSTOLIC: 3 CM
ECHO LV IVSD: 1.1 CM (ref 0.6–0.9)
ECHO LV MASS 2D: 168.9 G (ref 67–162)
ECHO LV MASS INDEX 2D: 87.1 G/M2 (ref 43–95)
ECHO LV POSTERIOR WALL DIASTOLIC: 1.1 CM (ref 0.6–0.9)
ECHO LV RELATIVE WALL THICKNESS RATIO: 0.5
ECHO LVOT AREA: 3.1 CM2
ECHO LVOT DIAM: 2 CM
ECHO LVOT PEAK GRADIENT: 10 MMHG
ECHO LVOT PEAK VELOCITY: 1.6 M/S
ECHO RA VOLUME: 125 ML
ECHO RA VOLUME: 139 ML
ECHO RV INTERNAL DIMENSION: 4.7 CM
ECHO TV REGURGITANT MAX VELOCITY: 3.1 M/S
ECHO TV REGURGITANT PEAK GRADIENT: 39 MMHG
GLUCOSE SERPL-MCNC: 139 MG/DL (ref 65–100)
POTASSIUM SERPL-SCNC: 4.3 MMOL/L (ref 3.5–5.1)
SODIUM SERPL-SCNC: 138 MMOL/L (ref 136–145)
T4 FREE SERPL-MCNC: 1.3 NG/DL (ref 0.8–1.5)
TROPONIN I SERPL HS-MCNC: 77 NG/L (ref 0–51)
TROPONIN I SERPL HS-MCNC: 80 NG/L (ref 0–51)
TROPONIN I SERPL HS-MCNC: 81 NG/L (ref 0–51)
TSH SERPL DL<=0.05 MIU/L-ACNC: 1.26 UIU/ML (ref 0.36–3.74)
URATE SERPL-MCNC: 13.1 MG/DL (ref 2.6–6)

## 2023-08-11 PROCEDURE — 84550 ASSAY OF BLOOD/URIC ACID: CPT

## 2023-08-11 PROCEDURE — G0378 HOSPITAL OBSERVATION PER HR: HCPCS

## 2023-08-11 PROCEDURE — 97161 PT EVAL LOW COMPLEX 20 MIN: CPT

## 2023-08-11 PROCEDURE — 6370000000 HC RX 637 (ALT 250 FOR IP): Performed by: INTERNAL MEDICINE

## 2023-08-11 PROCEDURE — 97116 GAIT TRAINING THERAPY: CPT

## 2023-08-11 PROCEDURE — 97530 THERAPEUTIC ACTIVITIES: CPT

## 2023-08-11 PROCEDURE — 84484 ASSAY OF TROPONIN QUANT: CPT

## 2023-08-11 PROCEDURE — 93308 TTE F-UP OR LMTD: CPT

## 2023-08-11 PROCEDURE — 84439 ASSAY OF FREE THYROXINE: CPT

## 2023-08-11 PROCEDURE — 97535 SELF CARE MNGMENT TRAINING: CPT

## 2023-08-11 PROCEDURE — 36415 COLL VENOUS BLD VENIPUNCTURE: CPT

## 2023-08-11 PROCEDURE — 80048 BASIC METABOLIC PNL TOTAL CA: CPT

## 2023-08-11 PROCEDURE — 2580000003 HC RX 258: Performed by: INTERNAL MEDICINE

## 2023-08-11 PROCEDURE — 84443 ASSAY THYROID STIM HORMONE: CPT

## 2023-08-11 PROCEDURE — 1100000000 HC RM PRIVATE

## 2023-08-11 PROCEDURE — 6360000002 HC RX W HCPCS: Performed by: INTERNAL MEDICINE

## 2023-08-11 RX ORDER — ENOXAPARIN SODIUM 100 MG/ML
30 INJECTION SUBCUTANEOUS DAILY
Status: DISCONTINUED | OUTPATIENT
Start: 2023-08-11 | End: 2023-08-13

## 2023-08-11 RX ORDER — COLCHICINE 0.6 MG/1
0.6 TABLET ORAL ONCE
Status: COMPLETED | OUTPATIENT
Start: 2023-08-11 | End: 2023-08-11

## 2023-08-11 RX ORDER — CASTOR OIL AND BALSAM, PERU 788; 87 MG/G; MG/G
OINTMENT TOPICAL 2 TIMES DAILY
Status: DISCONTINUED | OUTPATIENT
Start: 2023-08-11 | End: 2023-08-17 | Stop reason: HOSPADM

## 2023-08-11 RX ORDER — AMLODIPINE BESYLATE 5 MG/1
10 TABLET ORAL DAILY
Status: DISCONTINUED | OUTPATIENT
Start: 2023-08-11 | End: 2023-08-17 | Stop reason: HOSPADM

## 2023-08-11 RX ADMIN — SODIUM CHLORIDE, PRESERVATIVE FREE 10 ML: 5 INJECTION INTRAVENOUS at 09:00

## 2023-08-11 RX ADMIN — COLCHICINE 0.6 MG: 0.6 TABLET, FILM COATED ORAL at 09:00

## 2023-08-11 RX ADMIN — ENOXAPARIN SODIUM 30 MG: 30 INJECTION SUBCUTANEOUS at 09:23

## 2023-08-11 RX ADMIN — AMLODIPINE BESYLATE 10 MG: 5 TABLET ORAL at 09:10

## 2023-08-11 RX ADMIN — Medication: at 21:10

## 2023-08-11 RX ADMIN — Medication: at 09:16

## 2023-08-11 RX ADMIN — SODIUM CHLORIDE, PRESERVATIVE FREE 10 ML: 5 INJECTION INTRAVENOUS at 21:09

## 2023-08-11 ASSESSMENT — PAIN SCALES - GENERAL
PAINLEVEL_OUTOF10: 0
PAINLEVEL_OUTOF10: 0
PAINLEVEL_OUTOF10: 5
PAINLEVEL_OUTOF10: 0
PAINLEVEL_OUTOF10: 0

## 2023-08-11 ASSESSMENT — PAIN DESCRIPTION - ORIENTATION: ORIENTATION: RIGHT

## 2023-08-11 ASSESSMENT — PAIN DESCRIPTION - DESCRIPTORS: DESCRIPTORS: ACHING

## 2023-08-11 ASSESSMENT — PAIN SCALES - WONG BAKER
WONGBAKER_NUMERICALRESPONSE: 0
WONGBAKER_NUMERICALRESPONSE: 0

## 2023-08-11 ASSESSMENT — PAIN DESCRIPTION - LOCATION: LOCATION: LEG

## 2023-08-11 NOTE — CONSULTS
input(s): TIBC, FERR in the last 72 hours. Invalid input(s): FE, PSAT   No results for input(s): PH, PCO2, PO2 in the last 72 hours. No results for input(s): CPK, CKMB, TROPONINI in the last 72 hours. No results found for: GLUCPOC    Procedures: see electronic medical records for all procedures/Xrays and details which were not copied into this note but were reviewed prior to creation of Plan.    ________________________________________________________________________       ___________________________________________________  Consulting Physician:  Erna Chu MD

## 2023-08-11 NOTE — WOUND CARE
Wound care nurse consult for bilateral heel wounds. 79 y/o AAF admitted for CLAIRE. Past Medical History:   Diagnosis Date    Chronic kidney disease     Hypercholesterolemia     Hypertension      Past Surgical History:   Procedure Laterality Date    CATARACT REMOVAL  05/2018    left    CATARACT REMOVAL Right 03/27/2018    CYST REMOVAL  1976    ear,     Patient has a history of BLE venous stasis ulcers and at one time was followed at ED [facility] for those wounds. Currently patient does not have any wounds to BLE or bilateral heels. She has some Tubigrip stockings for some mild compression - they are not dressings for a wound. Spoke with patients daughter who has been taking care of her mothers BLE very well and there has been no re-occurrence of venous stasis wounds with the use of Tubigrip stockings and occasional use of compression boots. Patient last seen at ED [facility] 5/2023.     Recommend:    Elevate legs and float heels    PATRIZIA FOSTER [address]agen[address] RN, CWON

## 2023-08-11 NOTE — CARE COORDINATION
Care Management Initial Assessment       RUR:observation  Readmission? No  1st IM letter given? No   will sign today  1st  letter given: No     08/11/23 1522   Service Assessment   Patient Orientation Alert and Oriented   Cognition Alert   History Provided By Patient; Child/Family   Primary Caregiver Self   Accompanied By/Relationship Sister [address] Members   Patient's Healthcare Decision Maker is: Named in Carlyn E Moises Rabago   PCP Verified by CM Yes   Last Visit to PCP Within last 3 months   Prior Functional Level Independent in ADLs/IADLs   Current Functional Level Assistance with the following:   Can patient return to prior living arrangement Unknown at present   Ability to make needs known: Fair   Family able to assist with home care needs:   (lives by self)   Would you like for me to discuss the discharge plan with any other family members/significant others, and if so, who? Yes  (sister)   Financial Resources Medicare   CM/ Referral Family concerns/conflicts   Social/Functional History   Lives With Alone   Type of [address] [place] [address] Center  One level   Home Access   (ramp)   [address] bed;Rollator   [address] Needs assistance   Toileting Needs assistance   [address] Needs assistance   Homemaking Responsibilities Yes   Ambulation Assistance Needs assistance   Transfer Assistance Needs assistance   Active  No   Occupation Urlistd     [employer] - Private Location App met with patient, introduced self, explained role and offered assistance  Sister Terrence Vegas who is on Advance Directive as HCDM was present  Patient came from home where she has decreased mobility and decreased ability to self care  She has had increased swelling LE and admitted with Acute Renal Failure  She is followed outpatient by wound clinic, Dr. Reagan Peters who is seeing her here. Sister helps to apply wraps and also the rasheed boot at home.   She is interested in getting

## 2023-08-11 NOTE — CARE COORDINATION
TRANSPORTATION AUTHORIZATIONCecilia Miner: DEDICATED TEAM: [phone] OPT 3  PER RADHA: [facility] DOES NOT REQUIRE PRECERTIFICATION.

## 2023-08-12 LAB
ANION GAP SERPL CALC-SCNC: 7 MMOL/L (ref 5–15)
BUN SERPL-MCNC: 66 MG/DL (ref 6–20)
BUN/CREAT SERPL: 21 (ref 12–20)
CALCIUM SERPL-MCNC: 9.1 MG/DL (ref 8.5–10.1)
CHLORIDE SERPL-SCNC: 108 MMOL/L (ref 97–108)
CO2 SERPL-SCNC: 26 MMOL/L (ref 21–32)
CREAT SERPL-MCNC: 3.15 MG/DL (ref 0.55–1.02)
CREAT UR-MCNC: 155 MG/DL
GLUCOSE SERPL-MCNC: 124 MG/DL (ref 65–100)
POTASSIUM SERPL-SCNC: 4.2 MMOL/L (ref 3.5–5.1)
SODIUM SERPL-SCNC: 141 MMOL/L (ref 136–145)
SODIUM UR-SCNC: 7 MMOL/L

## 2023-08-12 PROCEDURE — 36415 COLL VENOUS BLD VENIPUNCTURE: CPT

## 2023-08-12 PROCEDURE — 2580000003 HC RX 258: Performed by: INTERNAL MEDICINE

## 2023-08-12 PROCEDURE — 6370000000 HC RX 637 (ALT 250 FOR IP): Performed by: INTERNAL MEDICINE

## 2023-08-12 PROCEDURE — 1100000000 HC RM PRIVATE

## 2023-08-12 PROCEDURE — 6360000002 HC RX W HCPCS: Performed by: INTERNAL MEDICINE

## 2023-08-12 PROCEDURE — 80048 BASIC METABOLIC PNL TOTAL CA: CPT

## 2023-08-12 PROCEDURE — 84300 ASSAY OF URINE SODIUM: CPT

## 2023-08-12 PROCEDURE — 82570 ASSAY OF URINE CREATININE: CPT

## 2023-08-12 RX ORDER — COLCHICINE 0.6 MG/1
0.3 TABLET ORAL DAILY
Status: DISCONTINUED | OUTPATIENT
Start: 2023-08-12 | End: 2023-08-16

## 2023-08-12 RX ORDER — SODIUM CHLORIDE 450 MG/100ML
INJECTION, SOLUTION INTRAVENOUS CONTINUOUS
Status: DISCONTINUED | OUTPATIENT
Start: 2023-08-12 | End: 2023-08-14

## 2023-08-12 RX ADMIN — SODIUM CHLORIDE, PRESERVATIVE FREE 10 ML: 5 INJECTION INTRAVENOUS at 09:06

## 2023-08-12 RX ADMIN — SODIUM CHLORIDE, PRESERVATIVE FREE 10 ML: 5 INJECTION INTRAVENOUS at 21:36

## 2023-08-12 RX ADMIN — AMLODIPINE BESYLATE 10 MG: 5 TABLET ORAL at 09:03

## 2023-08-12 RX ADMIN — Medication: at 21:46

## 2023-08-12 RX ADMIN — Medication: at 09:06

## 2023-08-12 RX ADMIN — ENOXAPARIN SODIUM 30 MG: 30 INJECTION SUBCUTANEOUS at 09:05

## 2023-08-12 RX ADMIN — COLCHICINE 0.3 MG: 0.6 TABLET, FILM COATED ORAL at 10:00

## 2023-08-12 RX ADMIN — SODIUM CHLORIDE: 4.5 INJECTION, SOLUTION INTRAVENOUS at 14:13

## 2023-08-12 RX ADMIN — OXYCODONE HYDROCHLORIDE 2.5 MG: 5 TABLET ORAL at 21:43

## 2023-08-12 ASSESSMENT — PAIN SCALES - GENERAL
PAINLEVEL_OUTOF10: 0
PAINLEVEL_OUTOF10: 6
PAINLEVEL_OUTOF10: 0
PAINLEVEL_OUTOF10: 3
PAINLEVEL_OUTOF10: 10
PAINLEVEL_OUTOF10: 0

## 2023-08-12 ASSESSMENT — PAIN - FUNCTIONAL ASSESSMENT: PAIN_FUNCTIONAL_ASSESSMENT: ACTIVITIES ARE NOT PREVENTED

## 2023-08-12 ASSESSMENT — PAIN DESCRIPTION - ONSET: ONSET: ON-GOING

## 2023-08-12 ASSESSMENT — PAIN DESCRIPTION - LOCATION
LOCATION: ABDOMEN
LOCATION: KNEE

## 2023-08-12 ASSESSMENT — PAIN DESCRIPTION - FREQUENCY: FREQUENCY: CONTINUOUS

## 2023-08-12 ASSESSMENT — PAIN SCALES - WONG BAKER
WONGBAKER_NUMERICALRESPONSE: 0

## 2023-08-12 ASSESSMENT — PAIN DESCRIPTION - DESCRIPTORS: DESCRIPTORS: ACHING

## 2023-08-12 ASSESSMENT — PAIN DESCRIPTION - PAIN TYPE: TYPE: ACUTE PAIN;CHRONIC PAIN

## 2023-08-12 ASSESSMENT — PAIN DESCRIPTION - ORIENTATION: ORIENTATION: LOWER

## 2023-08-13 LAB
ANION GAP SERPL CALC-SCNC: 8 MMOL/L (ref 5–15)
BUN SERPL-MCNC: 67 MG/DL (ref 6–20)
BUN/CREAT SERPL: 23 (ref 12–20)
CALCIUM SERPL-MCNC: 8.9 MG/DL (ref 8.5–10.1)
CHLORIDE SERPL-SCNC: 106 MMOL/L (ref 97–108)
CO2 SERPL-SCNC: 24 MMOL/L (ref 21–32)
CREAT SERPL-MCNC: 2.97 MG/DL (ref 0.55–1.02)
GLUCOSE SERPL-MCNC: 135 MG/DL (ref 65–100)
POTASSIUM SERPL-SCNC: 4.1 MMOL/L (ref 3.5–5.1)
SODIUM SERPL-SCNC: 138 MMOL/L (ref 136–145)

## 2023-08-13 PROCEDURE — 6370000000 HC RX 637 (ALT 250 FOR IP): Performed by: INTERNAL MEDICINE

## 2023-08-13 PROCEDURE — 36415 COLL VENOUS BLD VENIPUNCTURE: CPT

## 2023-08-13 PROCEDURE — 2580000003 HC RX 258: Performed by: INTERNAL MEDICINE

## 2023-08-13 PROCEDURE — 1100000000 HC RM PRIVATE

## 2023-08-13 PROCEDURE — 80048 BASIC METABOLIC PNL TOTAL CA: CPT

## 2023-08-13 PROCEDURE — 6360000002 HC RX W HCPCS: Performed by: INTERNAL MEDICINE

## 2023-08-13 PROCEDURE — 2700000000 HC OXYGEN THERAPY PER DAY

## 2023-08-13 RX ORDER — HEPARIN SODIUM 5000 [USP'U]/ML
5000 INJECTION, SOLUTION INTRAVENOUS; SUBCUTANEOUS EVERY 8 HOURS SCHEDULED
Status: DISCONTINUED | OUTPATIENT
Start: 2023-08-13 | End: 2023-08-13

## 2023-08-13 RX ORDER — HEPARIN SODIUM 5000 [USP'U]/ML
5000 INJECTION, SOLUTION INTRAVENOUS; SUBCUTANEOUS EVERY 8 HOURS SCHEDULED
Status: DISCONTINUED | OUTPATIENT
Start: 2023-08-14 | End: 2023-08-17 | Stop reason: HOSPADM

## 2023-08-13 RX ADMIN — ENOXAPARIN SODIUM 30 MG: 30 INJECTION SUBCUTANEOUS at 08:30

## 2023-08-13 RX ADMIN — SODIUM CHLORIDE: 4.5 INJECTION, SOLUTION INTRAVENOUS at 08:36

## 2023-08-13 RX ADMIN — COLCHICINE 0.3 MG: 0.6 TABLET, FILM COATED ORAL at 08:29

## 2023-08-13 RX ADMIN — SODIUM CHLORIDE, PRESERVATIVE FREE 10 ML: 5 INJECTION INTRAVENOUS at 21:07

## 2023-08-13 RX ADMIN — AMLODIPINE BESYLATE 10 MG: 5 TABLET ORAL at 08:29

## 2023-08-13 RX ADMIN — SODIUM CHLORIDE: 4.5 INJECTION, SOLUTION INTRAVENOUS at 22:10

## 2023-08-13 RX ADMIN — Medication: at 08:28

## 2023-08-13 RX ADMIN — Medication: at 21:08

## 2023-08-13 RX ADMIN — SODIUM CHLORIDE, PRESERVATIVE FREE 10 ML: 5 INJECTION INTRAVENOUS at 08:30

## 2023-08-13 ASSESSMENT — PAIN SCALES - GENERAL
PAINLEVEL_OUTOF10: 4
PAINLEVEL_OUTOF10: 0

## 2023-08-13 ASSESSMENT — PAIN SCALES - WONG BAKER
WONGBAKER_NUMERICALRESPONSE: 0

## 2023-08-14 LAB
ANION GAP SERPL CALC-SCNC: 6 MMOL/L (ref 5–15)
BUN SERPL-MCNC: 62 MG/DL (ref 6–20)
BUN/CREAT SERPL: 23 (ref 12–20)
CALCIUM SERPL-MCNC: 8.9 MG/DL (ref 8.5–10.1)
CHLORIDE SERPL-SCNC: 108 MMOL/L (ref 97–108)
CO2 SERPL-SCNC: 23 MMOL/L (ref 21–32)
CREAT SERPL-MCNC: 2.73 MG/DL (ref 0.55–1.02)
ERYTHROCYTE [DISTWIDTH] IN BLOOD BY AUTOMATED COUNT: 19.7 % (ref 11.5–14.5)
GLUCOSE SERPL-MCNC: 136 MG/DL (ref 65–100)
HCT VFR BLD AUTO: 32.6 % (ref 35–47)
HGB BLD-MCNC: 10.3 G/DL (ref 11.5–16)
MAGNESIUM SERPL-MCNC: 2.1 MG/DL (ref 1.6–2.4)
MCH RBC QN AUTO: 29.2 PG (ref 26–34)
MCHC RBC AUTO-ENTMCNC: 31.6 G/DL (ref 30–36.5)
MCV RBC AUTO: 92.4 FL (ref 80–99)
NRBC # BLD: 0 K/UL (ref 0–0.01)
NRBC BLD-RTO: 0 PER 100 WBC
PLATELET # BLD AUTO: 173 K/UL (ref 150–400)
PMV BLD AUTO: 12 FL (ref 8.9–12.9)
POTASSIUM SERPL-SCNC: 4 MMOL/L (ref 3.5–5.1)
RBC # BLD AUTO: 3.53 M/UL (ref 3.8–5.2)
SODIUM SERPL-SCNC: 137 MMOL/L (ref 136–145)
WBC # BLD AUTO: 6.4 K/UL (ref 3.6–11)

## 2023-08-14 PROCEDURE — 80048 BASIC METABOLIC PNL TOTAL CA: CPT

## 2023-08-14 PROCEDURE — 97116 GAIT TRAINING THERAPY: CPT

## 2023-08-14 PROCEDURE — 6370000000 HC RX 637 (ALT 250 FOR IP): Performed by: INTERNAL MEDICINE

## 2023-08-14 PROCEDURE — 97535 SELF CARE MNGMENT TRAINING: CPT

## 2023-08-14 PROCEDURE — 6360000002 HC RX W HCPCS: Performed by: INTERNAL MEDICINE

## 2023-08-14 PROCEDURE — 1100000000 HC RM PRIVATE

## 2023-08-14 PROCEDURE — 97110 THERAPEUTIC EXERCISES: CPT

## 2023-08-14 PROCEDURE — 97530 THERAPEUTIC ACTIVITIES: CPT

## 2023-08-14 PROCEDURE — 83735 ASSAY OF MAGNESIUM: CPT

## 2023-08-14 PROCEDURE — 2580000003 HC RX 258: Performed by: INTERNAL MEDICINE

## 2023-08-14 PROCEDURE — 85027 COMPLETE CBC AUTOMATED: CPT

## 2023-08-14 PROCEDURE — 36415 COLL VENOUS BLD VENIPUNCTURE: CPT

## 2023-08-14 RX ADMIN — AMLODIPINE BESYLATE 10 MG: 5 TABLET ORAL at 08:33

## 2023-08-14 RX ADMIN — SODIUM CHLORIDE, PRESERVATIVE FREE 10 ML: 5 INJECTION INTRAVENOUS at 08:34

## 2023-08-14 RX ADMIN — HEPARIN SODIUM 5000 UNITS: 5000 INJECTION INTRAVENOUS; SUBCUTANEOUS at 14:43

## 2023-08-14 RX ADMIN — HEPARIN SODIUM 5000 UNITS: 5000 INJECTION INTRAVENOUS; SUBCUTANEOUS at 06:14

## 2023-08-14 RX ADMIN — SODIUM CHLORIDE: 4.5 INJECTION, SOLUTION INTRAVENOUS at 11:33

## 2023-08-14 RX ADMIN — Medication: at 08:34

## 2023-08-14 RX ADMIN — SODIUM CHLORIDE, PRESERVATIVE FREE 10 ML: 5 INJECTION INTRAVENOUS at 22:56

## 2023-08-14 RX ADMIN — HEPARIN SODIUM 5000 UNITS: 5000 INJECTION INTRAVENOUS; SUBCUTANEOUS at 22:55

## 2023-08-14 RX ADMIN — COLCHICINE 0.3 MG: 0.6 TABLET, FILM COATED ORAL at 08:33

## 2023-08-14 RX ADMIN — Medication: at 22:55

## 2023-08-14 ASSESSMENT — PAIN SCALES - GENERAL
PAINLEVEL_OUTOF10: 0

## 2023-08-14 NOTE — CARE COORDINATION
Transition of Care Plan:    RUR: 17%  Prior Level of Functioning: assistance  Disposition: SNF  If SNF or IPR: Date FOC offered: 8/11  Date FOC received: 8/11  Accepting facility: [facility]  Date authorization started with reference number: started 8/14  Date authorization received and expires: Follow up appointments: to be arranged by facility  DME needed: walker and to be arranged by facility   Transportation at discharge: TBD  IM/IMM Medicare/ letter given: sign at DC  Is patient a [place] and connected with VA? no   If yes, was [employer] transfer form completed and VA notified? Caregiver Contact: Richie Rai (Other)   [phone]     Discharge Caregiver contacted prior to discharge? yes  Care Conference needed? no  Barriers to discharge: medical stability    CM received a call from [facility]. They will begin auth today on admission. 4:18 pm Auth received from Rubén Aranda.     Alissa Milligan RN [address] [place] [address]

## 2023-08-15 LAB
ALBUMIN SERPL-MCNC: 2.5 G/DL (ref 3.5–5)
ANION GAP SERPL CALC-SCNC: 6 MMOL/L (ref 5–15)
BUN SERPL-MCNC: 59 MG/DL (ref 6–20)
BUN/CREAT SERPL: 24 (ref 12–20)
CALCIUM SERPL-MCNC: 9.1 MG/DL (ref 8.5–10.1)
CHLORIDE SERPL-SCNC: 110 MMOL/L (ref 97–108)
CO2 SERPL-SCNC: 22 MMOL/L (ref 21–32)
CREAT SERPL-MCNC: 2.48 MG/DL (ref 0.55–1.02)
ERYTHROCYTE [DISTWIDTH] IN BLOOD BY AUTOMATED COUNT: 19.4 % (ref 11.5–14.5)
GLUCOSE SERPL-MCNC: 102 MG/DL (ref 65–100)
HCT VFR BLD AUTO: 31.7 % (ref 35–47)
HGB BLD-MCNC: 10.2 G/DL (ref 11.5–16)
MAGNESIUM SERPL-MCNC: 2.1 MG/DL (ref 1.6–2.4)
MCH RBC QN AUTO: 29.6 PG (ref 26–34)
MCHC RBC AUTO-ENTMCNC: 32.2 G/DL (ref 30–36.5)
MCV RBC AUTO: 91.9 FL (ref 80–99)
NRBC # BLD: 0 K/UL (ref 0–0.01)
NRBC BLD-RTO: 0 PER 100 WBC
PHOSPHATE SERPL-MCNC: 2.8 MG/DL (ref 2.6–4.7)
PLATELET # BLD AUTO: 170 K/UL (ref 150–400)
PMV BLD AUTO: 11.7 FL (ref 8.9–12.9)
POTASSIUM SERPL-SCNC: 4.1 MMOL/L (ref 3.5–5.1)
RBC # BLD AUTO: 3.45 M/UL (ref 3.8–5.2)
SODIUM SERPL-SCNC: 138 MMOL/L (ref 136–145)
WBC # BLD AUTO: 5.4 K/UL (ref 3.6–11)

## 2023-08-15 PROCEDURE — 2580000003 HC RX 258: Performed by: INTERNAL MEDICINE

## 2023-08-15 PROCEDURE — 80069 RENAL FUNCTION PANEL: CPT

## 2023-08-15 PROCEDURE — 97530 THERAPEUTIC ACTIVITIES: CPT | Performed by: OCCUPATIONAL THERAPIST

## 2023-08-15 PROCEDURE — 97535 SELF CARE MNGMENT TRAINING: CPT | Performed by: OCCUPATIONAL THERAPIST

## 2023-08-15 PROCEDURE — 1100000000 HC RM PRIVATE

## 2023-08-15 PROCEDURE — 36415 COLL VENOUS BLD VENIPUNCTURE: CPT

## 2023-08-15 PROCEDURE — 6370000000 HC RX 637 (ALT 250 FOR IP): Performed by: INTERNAL MEDICINE

## 2023-08-15 PROCEDURE — 85027 COMPLETE CBC AUTOMATED: CPT

## 2023-08-15 PROCEDURE — 83735 ASSAY OF MAGNESIUM: CPT

## 2023-08-15 PROCEDURE — 6360000002 HC RX W HCPCS: Performed by: INTERNAL MEDICINE

## 2023-08-15 RX ADMIN — SODIUM CHLORIDE, PRESERVATIVE FREE 10 ML: 5 INJECTION INTRAVENOUS at 08:57

## 2023-08-15 RX ADMIN — HEPARIN SODIUM 5000 UNITS: 5000 INJECTION INTRAVENOUS; SUBCUTANEOUS at 07:33

## 2023-08-15 RX ADMIN — HEPARIN SODIUM 5000 UNITS: 5000 INJECTION INTRAVENOUS; SUBCUTANEOUS at 15:01

## 2023-08-15 RX ADMIN — SODIUM CHLORIDE, PRESERVATIVE FREE 10 ML: 5 INJECTION INTRAVENOUS at 21:29

## 2023-08-15 RX ADMIN — Medication: at 08:56

## 2023-08-15 RX ADMIN — COLCHICINE 0.3 MG: 0.6 TABLET, FILM COATED ORAL at 08:52

## 2023-08-15 RX ADMIN — HEPARIN SODIUM 5000 UNITS: 5000 INJECTION INTRAVENOUS; SUBCUTANEOUS at 21:29

## 2023-08-15 RX ADMIN — AMLODIPINE BESYLATE 10 MG: 5 TABLET ORAL at 08:52

## 2023-08-15 RX ADMIN — ACETAMINOPHEN 650 MG: 325 TABLET ORAL at 18:38

## 2023-08-15 RX ADMIN — Medication: at 21:31

## 2023-08-15 ASSESSMENT — PAIN SCALES - WONG BAKER
WONGBAKER_NUMERICALRESPONSE: 0

## 2023-08-15 ASSESSMENT — PAIN DESCRIPTION - LOCATION
LOCATION: LEG
LOCATION: KNEE
LOCATION: KNEE

## 2023-08-15 ASSESSMENT — PAIN SCALES - GENERAL
PAINLEVEL_OUTOF10: 0
PAINLEVEL_OUTOF10: 2
PAINLEVEL_OUTOF10: 0
PAINLEVEL_OUTOF10: 5

## 2023-08-15 ASSESSMENT — PAIN DESCRIPTION - ORIENTATION
ORIENTATION: RIGHT
ORIENTATION: LEFT;RIGHT
ORIENTATION: RIGHT

## 2023-08-15 ASSESSMENT — PAIN DESCRIPTION - DESCRIPTORS: DESCRIPTORS: ACHING

## 2023-08-15 ASSESSMENT — PAIN - FUNCTIONAL ASSESSMENT: PAIN_FUNCTIONAL_ASSESSMENT: ACTIVITIES ARE NOT PREVENTED

## 2023-08-15 NOTE — FLOWSHEET NOTE
End of Shift Note    Bedside shift change report given to Aldo Rn  (oncoming nurse) by Benigno Mcclain RN (offgoing nurse). Report included the following information SBAR, Kardex, Intake/Output, MAR, Recent Results, and Cardiac Rhythm . Ovidio Mckinnon Shift worked:  [id number]       Shift summary and any significant changes:     Pt had an uneventful shift. Pt tolerated turning q 2hrs and cooperative with care. Remains sinus rhythm on cardiac monitor. No reports of pain.       Concerns for physician to address:  none     Zone phone for oncoming shift:   [phone] given to                                  Length of Stay:  Expected LOS: 2  Actual LOS: [facility], RN

## 2023-08-15 NOTE — CARE COORDINATION
Transition of Care Plan:     RUR: 17%  Prior Level of Functioning: assistance  Disposition: SNF  If SNF or IPR: Date FOC offered: 8/11  Date FOC received: 8/11  Accepting facility: [facility]  Date authorization started with reference number: started 8/14  Date authorization received and expires: Follow up appointments: to be arranged by facility  DME needed: walker and to be arranged by facility   Transportation at discharge: TBD  IM/IMM Medicare/ letter given: sign at DC  Is patient a [place] and connected with VA? no              If yes, was [employer] transfer form completed and VA notified? Caregiver Contact: Guilherme Snowden (Other)   [phone]      Discharge Caregiver contacted prior to discharge? yes  Care Conference needed? no  Barriers to discharge: medical stability     Auth approved for facility, Denisa garcía 8/16  Patient needs to be cleared by Nephro prior to DC   CM will need to note when labwork needed again and who will follow renal labs.     Priya Lopez RN [address] [place] [address]

## 2023-08-16 VITALS
HEIGHT: 64 IN | DIASTOLIC BLOOD PRESSURE: 61 MMHG | TEMPERATURE: 97.9 F | OXYGEN SATURATION: 97 % | BODY MASS INDEX: 33.29 KG/M2 | RESPIRATION RATE: 16 BRPM | HEART RATE: 65 BPM | WEIGHT: 195 LBS | SYSTOLIC BLOOD PRESSURE: 156 MMHG

## 2023-08-16 LAB
ALBUMIN SERPL-MCNC: 2.7 G/DL (ref 3.5–5)
ANION GAP SERPL CALC-SCNC: 4 MMOL/L (ref 5–15)
BUN SERPL-MCNC: 60 MG/DL (ref 6–20)
BUN/CREAT SERPL: 25 (ref 12–20)
CALCIUM SERPL-MCNC: 8.9 MG/DL (ref 8.5–10.1)
CHLORIDE SERPL-SCNC: 110 MMOL/L (ref 97–108)
CO2 SERPL-SCNC: 25 MMOL/L (ref 21–32)
CREAT SERPL-MCNC: 2.39 MG/DL (ref 0.55–1.02)
ERYTHROCYTE [DISTWIDTH] IN BLOOD BY AUTOMATED COUNT: 19.1 % (ref 11.5–14.5)
GLUCOSE SERPL-MCNC: 102 MG/DL (ref 65–100)
HCT VFR BLD AUTO: 31.6 % (ref 35–47)
HGB BLD-MCNC: 9.9 G/DL (ref 11.5–16)
MAGNESIUM SERPL-MCNC: 2.1 MG/DL (ref 1.6–2.4)
MCH RBC QN AUTO: 28.8 PG (ref 26–34)
MCHC RBC AUTO-ENTMCNC: 31.3 G/DL (ref 30–36.5)
MCV RBC AUTO: 91.9 FL (ref 80–99)
NRBC # BLD: 0 K/UL (ref 0–0.01)
NRBC BLD-RTO: 0 PER 100 WBC
PHOSPHATE SERPL-MCNC: 3.1 MG/DL (ref 2.6–4.7)
PLATELET # BLD AUTO: 190 K/UL (ref 150–400)
PMV BLD AUTO: 12.1 FL (ref 8.9–12.9)
POTASSIUM SERPL-SCNC: 4.3 MMOL/L (ref 3.5–5.1)
RBC # BLD AUTO: 3.44 M/UL (ref 3.8–5.2)
SODIUM SERPL-SCNC: 139 MMOL/L (ref 136–145)
WBC # BLD AUTO: 5.6 K/UL (ref 3.6–11)

## 2023-08-16 PROCEDURE — 2700000000 HC OXYGEN THERAPY PER DAY

## 2023-08-16 PROCEDURE — 36415 COLL VENOUS BLD VENIPUNCTURE: CPT

## 2023-08-16 PROCEDURE — 85027 COMPLETE CBC AUTOMATED: CPT

## 2023-08-16 PROCEDURE — 97116 GAIT TRAINING THERAPY: CPT

## 2023-08-16 PROCEDURE — 6370000000 HC RX 637 (ALT 250 FOR IP): Performed by: INTERNAL MEDICINE

## 2023-08-16 PROCEDURE — 80069 RENAL FUNCTION PANEL: CPT

## 2023-08-16 PROCEDURE — 6360000002 HC RX W HCPCS: Performed by: INTERNAL MEDICINE

## 2023-08-16 PROCEDURE — 97110 THERAPEUTIC EXERCISES: CPT

## 2023-08-16 PROCEDURE — 2580000003 HC RX 258: Performed by: INTERNAL MEDICINE

## 2023-08-16 PROCEDURE — 83735 ASSAY OF MAGNESIUM: CPT

## 2023-08-16 RX ADMIN — AMLODIPINE BESYLATE 10 MG: 5 TABLET ORAL at 08:16

## 2023-08-16 RX ADMIN — Medication: at 08:17

## 2023-08-16 RX ADMIN — ACETAMINOPHEN 650 MG: 325 TABLET ORAL at 08:16

## 2023-08-16 RX ADMIN — SODIUM CHLORIDE, PRESERVATIVE FREE 10 ML: 5 INJECTION INTRAVENOUS at 08:22

## 2023-08-16 RX ADMIN — HEPARIN SODIUM 5000 UNITS: 5000 INJECTION INTRAVENOUS; SUBCUTANEOUS at 05:36

## 2023-08-16 RX ADMIN — HEPARIN SODIUM 5000 UNITS: 5000 INJECTION INTRAVENOUS; SUBCUTANEOUS at 13:45

## 2023-08-16 RX ADMIN — COLCHICINE 0.3 MG: 0.6 TABLET, FILM COATED ORAL at 09:00

## 2023-08-16 ASSESSMENT — PAIN SCALES - GENERAL
PAINLEVEL_OUTOF10: 4
PAINLEVEL_OUTOF10: 0

## 2023-08-16 ASSESSMENT — PAIN SCALES - WONG BAKER
WONGBAKER_NUMERICALRESPONSE: 0

## 2023-08-16 ASSESSMENT — PAIN DESCRIPTION - ORIENTATION
ORIENTATION: LOWER
ORIENTATION: LOWER

## 2023-08-16 ASSESSMENT — PAIN - FUNCTIONAL ASSESSMENT
PAIN_FUNCTIONAL_ASSESSMENT: ACTIVITIES ARE NOT PREVENTED
PAIN_FUNCTIONAL_ASSESSMENT: ACTIVITIES ARE NOT PREVENTED

## 2023-08-16 ASSESSMENT — PAIN DESCRIPTION - DESCRIPTORS
DESCRIPTORS: ACHING
DESCRIPTORS: ACHING

## 2023-08-16 ASSESSMENT — PAIN DESCRIPTION - LOCATION
LOCATION: BACK
LOCATION: BACK

## 2023-08-16 ASSESSMENT — PAIN DESCRIPTION - PAIN TYPE: TYPE: CHRONIC PAIN

## 2023-08-16 NOTE — CARE COORDINATION
08/16/23 [place] Discharge   Transition of Care Consult (CM Consult) SNF   [place] Resource Information Provided? No   Mode of Transport at Discharge [address] Time of Discharge 1800   Confirm Follow Up Transport Other (see comment)   Condition of Participation: Discharge Planning   The Plan for Transition of Care is related to the following treatment goals: SNF   The Patient and/or Patient Representative was provided with a Choice of Provider? Patient;Patient Representative   Name of the Patient Representative who was provided with the Choice of Provider and agrees with the Discharge Plan? Sister Divina Filler   The Patient and/Or Patient Representative agree with the Discharge Plan? Yes   Freedom of Choice list was provided with basic dialogue that supports the patient's individualized plan of care/goals, treatment preferences, and shares the quality data associated with the providers? Yes     [facility] to transfer. Sadie and sister has been notified.     Le Zayas  [facility]

## 2023-08-16 NOTE — CARE COORDINATION
Transition of Care Plan to SNF/Rehab    Patient to go to [facility]  Room 132  [facility] to transport at  1400  Call report to     Communication to Patient/Family:  Met with patient and family and they are agreeable to the transition plan. The Plan for Transition of Care is related to the following treatment goals: SNF - [facility]    The Patient and/or patient representative was provided with a choice of provider and agrees  with the discharge plan. Yes [x] No []    A Freedom of choice list was provided with basic dialogue that supports the patient's individualized plan of care/goals and shares the quality data associated with the providers. Yes [x] No []    SNF/Rehab Transition:  Patient has been accepted to [facility] SNF/Rehab and meets criteria for admission. Patient will transported by [facility]  and expected to leave at 1400. Communication to SNF/Rehab:  Bedside RN, , has been notified to update the transition plan to the facility and call report (phone number). Discharge information has been updated on the AVS. And communicated to facility via [employer]s, or CC link. Discharge instructions to be sent to facility    Nursing Please include all hard scripts for controlled substances, med rec and dc summary, and AVS in packet. Reviewed and confirmed with facility, they  can manage the patient care needs for the following:     Dixie Ends with (X) only those applicable:  Medication:  [x]Medications are available at the facility  []IV Antibiotics    []Controlled Substance - hard copies available sent.   []Weekly Labs    Equipment:  []CPAP/BiPAP  []Wound Vacuum  []Colindres or Urinary Device  []PICC/Central Line  []Nebulizer  []Ventilator    Treatment:  []Isolation (for MRSA, VRE, etc.)  []Surgical Drain Management  []Tracheostomy Care  []Dressing Changes  []Dialysis with transportation  []PEG Care  []Oxygen  []Daily Weights for Heart Failure    Dietary:  []Any diet

## 2023-08-16 NOTE — PLAN OF CARE
Problem: Discharge Planning  Goal: Discharge to home or other facility with appropriate resources  8/11/2023 0354 by Evin Sy RN  Outcome: Progressing  Flowsheets (Taken 8/10/2023 2200)  Discharge to home or other facility with appropriate resources:   Identify barriers to discharge with patient and caregiver   Arrange for needed discharge resources and transportation as appropriate     Problem: Pain  Goal: Verbalizes/displays adequate comfort level or baseline comfort level  8/11/2023 0354 by Evin Sy RN  Outcome: Progressing     Problem: ABCDS Injury Assessment  Goal: Absence of physical injury  8/11/2023 0354 by Evin Sy RN  Outcome: Progressing
Problem: Discharge Planning  Goal: Discharge to home or other facility with appropriate resources  Outcome: Progressing     Problem: Safety - Adult  Goal: Free from fall injury  8/12/2023 0809 by Vladimir Kim RN  Outcome: Progressing  8/12/2023 0742 by Nikhil Roman RN  Outcome: Progressing     Problem: Pain  Goal: Verbalizes/displays adequate comfort level or baseline comfort level  8/12/2023 0742 by Nikhil Roman RN  Outcome: Progressing     Problem: Skin/Tissue Integrity  Goal: Absence of new skin breakdown  Description: 1. Monitor for areas of redness and/or skin breakdown  2. Assess vascular access sites hourly  3. Every 4-6 hours minimum:  Change oxygen saturation probe site  4. Every 4-6 hours:  If on nasal continuous positive airway pressure, respiratory therapy assess nares and determine need for appliance change or resting period.   8/12/2023 0742 by Nikhil Roman RN  Outcome: Progressing
Problem: Discharge Planning  Goal: Discharge to home or other facility with appropriate resources  Outcome: Progressing     Problem: Safety - Adult  Goal: Free from fall injury  Outcome: Progressing     Problem: Pain  Goal: Verbalizes/displays adequate comfort level or baseline comfort level  Outcome: Progressing     Problem: ABCDS Injury Assessment  Goal: Absence of physical injury  Outcome: Progressing     Problem: Skin/Tissue Integrity  Goal: Absence of new skin breakdown  Description: 1. Monitor for areas of redness and/or skin breakdown  2. Assess vascular access sites hourly  3. Every 4-6 hours minimum:  Change oxygen saturation probe site  4. Every 4-6 hours:  If on nasal continuous positive airway pressure, respiratory therapy assess nares and determine need for appliance change or resting period.   Outcome: Progressing     Problem: Chronic Conditions and Co-morbidities  Goal: Patient's chronic conditions and co-morbidity symptoms are monitored and maintained or improved  Outcome: Progressing
Problem: Discharge Planning  Goal: Discharge to home or other facility with appropriate resources  Outcome: Progressing  Flowsheets (Taken 8/10/2023 2200)  Discharge to home or other facility with appropriate resources:   Identify barriers to discharge with patient and caregiver   Arrange for needed discharge resources and transportation as appropriate     Problem: Pain  Goal: Verbalizes/displays adequate comfort level or baseline comfort level  Outcome: Progressing     Problem: ABCDS Injury Assessment  Goal: Absence of physical injury  Outcome: Progressing
Problem: Discharge Planning  Goal: Discharge to home or other facility with appropriate resources  Outcome: Progressing  Flowsheets (Taken 8/14/2023 2020)  Discharge to home or other facility with appropriate resources: Identify barriers to discharge with patient and caregiver     Problem: Safety - Adult  Goal: Free from fall injury  Outcome: Progressing  Flowsheets (Taken 8/14/2023 2020)  Free From Fall Injury: Instruct family/caregiver on patient safety     Problem: Pain  Goal: Verbalizes/displays adequate comfort level or baseline comfort level  Outcome: Progressing     Problem: ABCDS Injury Assessment  Goal: Absence of physical injury  Outcome: Progressing  Flowsheets (Taken 8/14/2023 2020)  Absence of Physical Injury: Implement safety measures based on patient assessment     Problem: Skin/Tissue Integrity  Goal: Absence of new skin breakdown  Description: 1. Monitor for areas of redness and/or skin breakdown  2. Assess vascular access sites hourly  3. Every 4-6 hours minimum:  Change oxygen saturation probe site  4. Every 4-6 hours:  If on nasal continuous positive airway pressure, respiratory therapy assess nares and determine need for appliance change or resting period. Outcome: Progressing     Problem: Physical Therapy - Adult  Goal: By Discharge: Performs mobility at highest level of function for planned discharge setting. See evaluation for individualized goals. Description: FUNCTIONAL STATUS PRIOR TO ADMISSION: Patient lives alone and reports she was ambulatory for short household distances with rollator, often struggles with mobility and reports she spends some days in bed and doesn't eat regularly. Her sister comes twice a week to help with wound care for her legs. HOME SUPPORT PRIOR TO ADMISSION: Lives alone, has minimal assistance available from sister. Physical Therapy Goals  Initiated 8/11/2023  1.   Patient will move from supine to sit and sit to supine in bed with minimal
Problem: Occupational Therapy - Adult  Goal: By Discharge: Performs self-care activities at highest level of function for planned discharge setting. See evaluation for individualized goals. Description: FUNCTIONAL STATUS PRIOR TO ADMISSION:  Patient was minimally ambulatory using rollator. HOME SUPPORT: Patient lived alone with sister to provide assistance 2x per week. Pt reports difficulty getting OOB and eating regular meals. Occupational Therapy Goals:  Initiated 8/11/2023  1. Patient will perform grooming at sink with Minimal Assist within 7 day(s). 2.  Patient will perform seated and supported bathing with Minimal Assist within 7 day(s). 3.  Patient will perform lower body dressing with Minimal Assist and AE PRN within 7 day(s). 4.  Patient will perform toilet transfers with Contact Guard Assist  within 7 day(s). 5.  Patient will perform all aspects of toileting with Minimal Assist within 7 day(s). 6.  Patient will participate in upper extremity therapeutic exercise/activities with Minimal Assist for 5 minutes within 7 day(s). 7.  Patient will utilize energy conservation techniques during functional activities with verbal cues within 7 day(s). Outcome: Progressing     OCCUPATIONAL THERAPY EVALUATION    Patient: Mamta Thornton (47 y.o. female)  Date: 8/11/2023  Primary Diagnosis: Bradycardia [R00.1]  Lymphedema [I89.0]  CLAIRE (acute kidney injury) ([address]) [N17.9]  Acute renal failure, unspecified acute renal failure type ([address]) [N17.9]         Precautions: Fall Risk                  ASSESSMENT :  The patient is limited by decreased functional mobility, independence in ADLs, strength, activity tolerance, endurance, safety awareness, increased pain levels following admission for R knee pain and SOB. Pt cleared for therapy by nursing and received supine in bed agreeable to therapy. Completed bed mobility to sit EOB with Max Ax2. Pt with R knee/thigh pain.  Mod Ax2 for sit>stand and transferred to
Problem: Occupational Therapy - Adult  Goal: By Discharge: Performs self-care activities at highest level of function for planned discharge setting. See evaluation for individualized goals. Description: FUNCTIONAL STATUS PRIOR TO ADMISSION:  Patient was minimally ambulatory using rollator. HOME SUPPORT: Patient lived alone with sister to provide assistance 2x per week. Pt reports difficulty getting OOB and eating regular meals. Occupational Therapy Goals:  Initiated 8/11/2023  1. Patient will perform grooming at sink with Minimal Assist within 7 day(s). 2.  Patient will perform seated and supported bathing with Minimal Assist within 7 day(s). 3.  Patient will perform lower body dressing with Minimal Assist and AE PRN within 7 day(s). 4.  Patient will perform toilet transfers with Contact Guard Assist  within 7 day(s). 5.  Patient will perform all aspects of toileting with Minimal Assist within 7 day(s). 6.  Patient will participate in upper extremity therapeutic exercise/activities with Minimal Assist for 5 minutes within 7 day(s). 7.  Patient will utilize energy conservation techniques during functional activities with verbal cues within 7 day(s). Outcome: Progressing     OCCUPATIONAL THERAPY TREATMENT  Patient: Francisco J Zhu (52 y.o. female)  Date: 8/14/2023  Primary Diagnosis: Bradycardia [R00.1]  Lymphedema [I89.0]  CLAIRE (acute kidney injury) ([address] [facility]) [N17.9]  Acute renal failure, unspecified acute renal failure type [facility]) [N17.9]       Precautions: Fall Risk                Chart, occupational therapy assessment, plan of care, and goals were reviewed. ASSESSMENT  Patient continues to benefit from skilled OT services and is slowly progressing towards goals. Pt noted with progressive mobility/balance, evidenced by completing initial sit to stand from armchair with Min Ax1 and CGAx1, with Min A to/from bathroom and Min Ax1 for toilet transfer.   Pt noted to benefit from Max A for toileting
Problem: Occupational Therapy - Adult  Goal: By Discharge: Performs self-care activities at highest level of function for planned discharge setting. See evaluation for individualized goals. Description: FUNCTIONAL STATUS PRIOR TO ADMISSION:  Patient was minimally ambulatory using rollator. HOME SUPPORT: Patient lived alone with sister to provide assistance 2x per week. Pt reports difficulty getting OOB and eating regular meals. Occupational Therapy Goals:  Initiated 8/11/2023  1. Patient will perform grooming at sink with Minimal Assist within 7 day(s). 2.  Patient will perform seated and supported bathing with Minimal Assist within 7 day(s). 3.  Patient will perform lower body dressing with Minimal Assist and AE PRN within 7 day(s). 4.  Patient will perform toilet transfers with Contact Guard Assist  within 7 day(s). 5.  Patient will perform all aspects of toileting with Minimal Assist within 7 day(s). 6.  Patient will participate in upper extremity therapeutic exercise/activities with Minimal Assist for 5 minutes within 7 day(s). 7.  Patient will utilize energy conservation techniques during functional activities with verbal cues within 7 day(s). Outcome: Progressing    OCCUPATIONAL THERAPY TREATMENT  Patient: Phuong De Los Santos (55 y.o. female)  Date: 8/15/2023  Primary Diagnosis: Bradycardia [R00.1]  Lymphedema [I89.0]  CLAIRE (acute kidney injury) ([address] ) [N17.9]  Acute renal failure, unspecified acute renal failure type [facility]) [N17.9]       Precautions: Fall Risk                Chart, occupational therapy assessment, plan of care, and goals were reviewed. ASSESSMENT  Patient presented up in chair, agreeable to participation in OT . Overall she was min A for transfers, CGA for ambulation with a RW, performed dressing ADLs with min A to total A, was CGA for standing grooming and she was up to max for toileting.  Attempted activity on RA, with patient saturating at in the 90s at rest, but
Problem: Physical Therapy - Adult  Goal: By Discharge: Performs mobility at highest level of function for planned discharge setting. See evaluation for individualized goals. Description: FUNCTIONAL STATUS PRIOR TO ADMISSION: Patient lives alone and reports she was ambulatory for short household distances with rollator, often struggles with mobility and reports she spends some days in bed and doesn't eat regularly. Her sister comes twice a week to help with wound care for her legs. HOME SUPPORT PRIOR TO ADMISSION: Lives alone, has minimal assistance available from sister. Physical Therapy Goals  Initiated 8/11/2023  1. Patient will move from supine to sit and sit to supine in bed with minimal assistance within 7 day(s). 2.  Patient will perform sit to stand with minimal assistance within 7 day(s). 3.  Patient will transfer from bed to chair and chair to bed with minimal assistance using the least restrictive device within 7 day(s). 4.  Patient will ambulate with minimal assistance for 30 feet with the least restrictive device within 7 day(s). 5.  Patient will ascend/descend 4 stairs with  handrail(s) with minimal assistance within 7 day(s). Outcome: Not Progressing    PHYSICAL THERAPY EVALUATION    Patient: Alexander Cantu (8 y.o. female)  Date: 8/11/2023  Primary Diagnosis: Bradycardia [R00.1]  Lymphedema [I89.0]  CLAIRE (acute kidney injury) ([address]) [N17.9]  Acute renal failure, unspecified acute renal failure type ([address]) [N17.9]       Precautions:                      ASSESSMENT :   DEFICITS/IMPAIRMENTS:   The patient is limited by decreased functional mobility, strength, activity tolerance, cognition, coordination, balance, increased pain levels. Patient received in bed and agreeable to participate, is alert and oriented but not a detailed historian. Reports increased difficulty with mobility at home and was not able to stand PTA due to right knee pain.   Patient required max assist x 2 to come
Problem: Physical Therapy - Adult  Goal: By Discharge: Performs mobility at highest level of function for planned discharge setting. See evaluation for individualized goals. Description: FUNCTIONAL STATUS PRIOR TO ADMISSION: Patient lives alone and reports she was ambulatory for short household distances with rollator, often struggles with mobility and reports she spends some days in bed and doesn't eat regularly. Her sister comes twice a week to help with wound care for her legs. HOME SUPPORT PRIOR TO ADMISSION: Lives alone, has minimal assistance available from sister. Physical Therapy Goals  Initiated 8/11/2023  1. Patient will move from supine to sit and sit to supine in bed with minimal assistance within 7 day(s). 2.  Patient will perform sit to stand with minimal assistance within 7 day(s). 3.  Patient will transfer from bed to chair and chair to bed with minimal assistance using the least restrictive device within 7 day(s). 4.  Patient will ambulate with minimal assistance for 30 feet with the least restrictive device within 7 day(s). 5.  Patient will ascend/descend 4 stairs with  handrail(s) with minimal assistance within 7 day(s). Outcome: Progressing   PHYSICAL THERAPY TREATMENT    Patient: Lise Garcia (15 y.o. female)  Date: 8/14/2023  Diagnosis: Bradycardia [R00.1]  Lymphedema [I89.0]  CLAIRE (acute kidney injury) ([address]) [N17.9]  Acute renal failure, unspecified acute renal failure type ([address]) [N17.9] CLAIRE (acute kidney injury) ([address])      Precautions: Fall Risk                    ASSESSMENT:  Patient continues to benefit from skilled PT services and is progressing towards goals. Patient received up in chair and agreeable to participate, sister also present in room. Patient able to ambulate x 20 feet into bathroom to toilet with RW and min assist with cuing for safety and sequence. Required max assist for clean up after BM. Was markedly fatigued post activity but VSS on RA.   Patient
Problem: Safety - Adult  Goal: Free from fall injury  Outcome: Progressing     Problem: Pain  Goal: Verbalizes/displays adequate comfort level or baseline comfort level  Outcome: Progressing     Problem: Skin/Tissue Integrity  Goal: Absence of new skin breakdown  Description: 1. Monitor for areas of redness and/or skin breakdown  2. Assess vascular access sites hourly  3. Every 4-6 hours minimum:  Change oxygen saturation probe site  4. Every 4-6 hours:  If on nasal continuous positive airway pressure, respiratory therapy assess nares and determine need for appliance change or resting period.   Outcome: Progressing
minimal assistance within 7 day(s). 2.  Patient will perform sit to stand with minimal assistance within 7 day(s). 3.  Patient will transfer from bed to chair and chair to bed with minimal assistance using the least restrictive device within 7 day(s). 4.  Patient will ambulate with minimal assistance for 30 feet with the least restrictive device within 7 day(s). 5.  Patient will ascend/descend 4 stairs with  handrail(s) with minimal assistance within 7 day(s).    8/11/2023 1126 by Peyton Rich PT  Outcome: Not Progressing
Gait belt;Walker, rolling  Neuro Re-Education:                      Activity Tolerance:   desaturates with activity and requires oxygen    After treatment:   Patient left in no apparent distress sitting up in chair, Call bell within reach, Bed/ chair alarm activated, and Caregiver / family present      COMMUNICATION/EDUCATION:   The patient's plan of care was discussed with: registered nurse    Patient Education  Education Provided: Home Exercise Program  Education Method: Demonstration;Verbal  Barriers to Learning: Cognition  Education Outcome: Continued education needed      Blue Vitale PT  Minutes: 28

## 2023-08-16 NOTE — CARE COORDINATION
CM Note  Patient is not ready for DC. Have requested [facility] to now be on will call. Nurse said she notified DrGill of some kind of bleeding. AVS is not complete.     Anne-Marie Valentine RN [address] [place] [address]

## 2023-09-08 RX ORDER — AMLODIPINE BESYLATE 2.5 MG/1
TABLET ORAL
Qty: 90 TABLET | Refills: 3 | Status: SHIPPED | OUTPATIENT
Start: 2023-09-08

## 2023-09-08 RX ORDER — FUROSEMIDE 40 MG/1
TABLET ORAL
Qty: 90 TABLET | Refills: 3 | Status: SHIPPED | OUTPATIENT
Start: 2023-09-08

## 2023-10-11 ENCOUNTER — TELEPHONE (OUTPATIENT)
Age: 88
End: 2023-10-11

## 2023-10-17 ENCOUNTER — TELEPHONE (OUTPATIENT)
Age: 88
End: 2023-10-17

## 2023-10-17 NOTE — TELEPHONE ENCOUNTER
Neena from [facility] called stating that pt has 2 new wounds on buttock area. Moni Fears 03 and .04 cm in size. Would like order for care products submitted. Pt has VV on 10/23 scheduled.

## 2023-10-18 ENCOUNTER — TELEPHONE (OUTPATIENT)
Age: 88
End: 2023-10-18

## 2023-10-18 NOTE — TELEPHONE ENCOUNTER
Aleksandar Adair is having L knee pain to the point she is having a lot of trouble getting out of bed. As of now she is bed ridden. Socrates Smiley wants to know if anything can be called in for the pain. Please advise.

## 2023-10-18 NOTE — TELEPHONE ENCOUNTER
S/w [facility] nurse we can discuss pain on VV Monday not acute. She is working with transfer exercise with family due to her being bed ridden.  She is taking Tylenol arthritis with minimal relief,GOLD

## 2023-10-18 NOTE — TELEPHONE ENCOUNTER
Will from [facility] called to say pt needs to be in a inpatient facility. Living situation is not good.  Her sister is trying to care for her but she is no spring chicken     Pt and sister  is [phone]

## 2023-10-23 ENCOUNTER — TELEMEDICINE (OUTPATIENT)
Age: 88
End: 2023-10-23
Payer: MEDICARE

## 2023-10-23 DIAGNOSIS — L20.84 INTRINSIC ECZEMA: Primary | ICD-10-CM

## 2023-10-23 PROCEDURE — 1123F ACP DISCUSS/DSCN MKR DOCD: CPT | Performed by: NURSE PRACTITIONER

## 2023-10-23 PROCEDURE — 99214 OFFICE O/P EST MOD 30 MIN: CPT | Performed by: NURSE PRACTITIONER

## 2023-10-23 RX ORDER — COLLOIDAL OATMEAL 1 %
CREAM (GRAM) TOPICAL
Qty: 156 G | Refills: 5 | Status: SHIPPED | OUTPATIENT
Start: 2023-10-23

## 2023-10-23 ASSESSMENT — PATIENT HEALTH QUESTIONNAIRE - PHQ9
1. LITTLE INTEREST OR PLEASURE IN DOING THINGS: 0
2. FEELING DOWN, DEPRESSED OR HOPELESS: 0
SUM OF ALL RESPONSES TO PHQ QUESTIONS 1-9: 0
SUM OF ALL RESPONSES TO PHQ9 QUESTIONS 1 & 2: 0
SUM OF ALL RESPONSES TO PHQ QUESTIONS 1-9: 0

## 2023-10-30 ENCOUNTER — TELEPHONE (OUTPATIENT)
Age: 88
End: 2023-10-30

## 2023-10-30 NOTE — TELEPHONE ENCOUNTER
----- Message from Kalyani Ospina sent at 10/30/2023 11:21 AM EDT -----  Subject: Message to Provider    QUESTIONS  Information for Provider? Patients Caretaker called in to state patient is   still coughing up yellow phlegm every morning. had a previous VV about   this   ---------------------------------------------------------------------------  --------------  [employer] [facility]  [phone]; OK to leave message on voicemail  ---------------------------------------------------------------------------  --------------  SCRIPT ANSWERS  Relationship to Patient? Other/Third Party  Representative Name? Christine Patterson, caretaker  Additional information verified (besides Name and Date of Birth)? Phone   Number  Do you have pain that has started or worsened within the past 24 hours? No  Are you vomiting blood or have bloody or black stool? No  Are you having fevers (100.4), chills, or sweats? No  Have you recently (14 days) seen a provider for this pain?  Yes

## 2023-11-05 ENCOUNTER — HOSPITAL ENCOUNTER (EMERGENCY)
Facility: HOSPITAL | Age: 88
Discharge: HOME | End: 2023-11-06
Attending: EMERGENCY MEDICINE
Payer: MEDICARE

## 2023-11-05 DIAGNOSIS — R44.1 HALLUCINATIONS, VISUAL: Primary | ICD-10-CM

## 2023-11-05 LAB
ALBUMIN SERPL-MCNC: 3 G/DL (ref 3.5–5)
ALBUMIN/GLOB SERPL: 0.5 (ref 1.1–2.2)
ALP SERPL-CCNC: 174 U/L (ref 45–117)
ALT SERPL-CCNC: 24 U/L (ref 12–78)
ANION GAP SERPL CALC-SCNC: 11 MMOL/L (ref 5–15)
AST SERPL-CCNC: 30 U/L (ref 15–37)
BASOPHILS # BLD: 0 K/UL (ref 0–0.1)
BASOPHILS NFR BLD: 1 % (ref 0–1)
BILIRUB SERPL-MCNC: 0.6 MG/DL (ref 0.2–1)
BUN SERPL-MCNC: 43 MG/DL (ref 6–20)
BUN/CREAT SERPL: 22 (ref 12–20)
CALCIUM SERPL-MCNC: 10.3 MG/DL (ref 8.5–10.1)
CHLORIDE SERPL-SCNC: 101 MMOL/L (ref 97–108)
CO2 SERPL-SCNC: 27 MMOL/L (ref 21–32)
CREAT SERPL-MCNC: 1.94 MG/DL (ref 0.55–1.02)
DIFFERENTIAL METHOD BLD: NORMAL
EOSINOPHIL # BLD: 0.2 K/UL (ref 0–0.4)
EOSINOPHIL NFR BLD: 3 % (ref 0–7)
ERYTHROCYTE [DISTWIDTH] IN BLOOD BY AUTOMATED COUNT: 14.1 % (ref 11.5–14.5)
GLOBULIN SER CALC-MCNC: 6.2 G/DL (ref 2–4)
GLUCOSE SERPL-MCNC: 143 MG/DL (ref 65–100)
HCT VFR BLD AUTO: 39.2 % (ref 35–47)
HGB BLD-MCNC: 12.8 G/DL (ref 11.5–16)
IMM GRANULOCYTES # BLD AUTO: 0 K/UL (ref 0–0.04)
IMM GRANULOCYTES NFR BLD AUTO: 0 % (ref 0–0.5)
LYMPHOCYTES # BLD: 2.2 K/UL (ref 0.8–3.5)
LYMPHOCYTES NFR BLD: 29 % (ref 12–49)
MCH RBC QN AUTO: 27.9 PG (ref 26–34)
MCHC RBC AUTO-ENTMCNC: 32.7 G/DL (ref 30–36.5)
MCV RBC AUTO: 85.6 FL (ref 80–99)
MONOCYTES # BLD: 0.7 K/UL (ref 0–1)
MONOCYTES NFR BLD: 9 % (ref 5–13)
NEUTS SEG # BLD: 4.5 K/UL (ref 1.8–8)
NEUTS SEG NFR BLD: 58 % (ref 32–75)
NRBC # BLD: 0 K/UL (ref 0–0.01)
NRBC BLD-RTO: 0 PER 100 WBC
PLATELET # BLD AUTO: 291 K/UL (ref 150–400)
PMV BLD AUTO: 11.7 FL (ref 8.9–12.9)
POTASSIUM SERPL-SCNC: 3.4 MMOL/L (ref 3.5–5.1)
PROT SERPL-MCNC: 9.2 G/DL (ref 6.4–8.2)
RBC # BLD AUTO: 4.58 M/UL (ref 3.8–5.2)
SODIUM SERPL-SCNC: 139 MMOL/L (ref 136–145)
WBC # BLD AUTO: 7.7 K/UL (ref 3.6–11)

## 2023-11-05 PROCEDURE — 85025 COMPLETE CBC W/AUTO DIFF WBC: CPT

## 2023-11-05 PROCEDURE — 36415 COLL VENOUS BLD VENIPUNCTURE: CPT

## 2023-11-05 PROCEDURE — 99284 EMERGENCY DEPT VISIT MOD MDM: CPT

## 2023-11-05 PROCEDURE — 80053 COMPREHEN METABOLIC PANEL: CPT

## 2023-11-06 ENCOUNTER — APPOINTMENT (OUTPATIENT)
Facility: HOSPITAL | Age: 88
End: 2023-11-06
Payer: MEDICARE

## 2023-11-06 VITALS
TEMPERATURE: 97.6 F | RESPIRATION RATE: 16 BRPM | SYSTOLIC BLOOD PRESSURE: 136 MMHG | DIASTOLIC BLOOD PRESSURE: 99 MMHG | HEART RATE: 85 BPM | OXYGEN SATURATION: 97 %

## 2023-11-06 LAB
APPEARANCE UR: CLEAR
BACTERIA SPEC CULT: NORMAL
BACTERIA URNS QL MICRO: ABNORMAL /HPF
BILIRUB UR QL: NEGATIVE
CC UR VC: NORMAL
COLOR UR: ABNORMAL
EPITH CASTS URNS QL MICRO: ABNORMAL /LPF
GLUCOSE UR STRIP.AUTO-MCNC: NEGATIVE MG/DL
HGB UR QL STRIP: NEGATIVE
KETONES UR QL STRIP.AUTO: NEGATIVE MG/DL
LEUKOCYTE ESTERASE UR QL STRIP.AUTO: NEGATIVE
NITRITE UR QL STRIP.AUTO: NEGATIVE
PH UR STRIP: 5 (ref 5–8)
PROT UR STRIP-MCNC: 30 MG/DL
RBC #/AREA URNS HPF: ABNORMAL /HPF (ref 0–5)
SERVICE CMNT-IMP: NORMAL
SP GR UR REFRACTOMETRY: 1.02 (ref 1–1.03)
URINE CULTURE IF INDICATED: ABNORMAL
UROBILINOGEN UR QL STRIP.AUTO: 0.2 EU/DL (ref 0.2–1)
WBC URNS QL MICRO: ABNORMAL /HPF (ref 0–4)

## 2023-11-06 PROCEDURE — 87086 URINE CULTURE/COLONY COUNT: CPT

## 2023-11-06 PROCEDURE — 81001 URINALYSIS AUTO W/SCOPE: CPT

## 2023-11-06 PROCEDURE — 70450 CT HEAD/BRAIN W/O DYE: CPT

## 2023-11-06 PROCEDURE — 6370000000 HC RX 637 (ALT 250 FOR IP): Performed by: EMERGENCY MEDICINE

## 2023-11-06 RX ORDER — RISPERIDONE 0.5 MG/1
0.25 TABLET ORAL
Status: DISCONTINUED | OUTPATIENT
Start: 2023-11-06 | End: 2023-11-06

## 2023-11-06 RX ORDER — RISPERIDONE 0.25 MG/1
0.25 TABLET ORAL
Qty: 14 TABLET | Refills: 0 | Status: SHIPPED | OUTPATIENT
Start: 2023-11-06 | End: 2023-11-20

## 2023-11-06 RX ORDER — RISPERIDONE 0.5 MG/1
0.25 TABLET ORAL
Status: COMPLETED | OUTPATIENT
Start: 2023-11-06 | End: 2023-11-06

## 2023-11-06 RX ADMIN — RISPERIDONE 0.25 MG: 0.5 TABLET ORAL at 04:00

## 2023-11-06 NOTE — ED NOTES
Climbing out of bed. Pulled up with 2 staff assist. Having visual hallucinations . States ' see the children over there.  Their mother needs to come get them now \"     Glenys Chávez  11/06/23 8991

## 2023-11-06 NOTE — ED NOTES
Having visual hallucinations . States \" its raining in my room.  Call the authorities\"     Glenys Cisneros  11/06/23 8706

## 2023-11-06 NOTE — ED NOTES
Contunues to yell out and c/o \"its raining in my room the floor is getting wet\" attempts to redirect unsuccessful     Kasie Gaines, [address]  11/06/23 0871 05-Jun-2023 00:16

## 2023-11-06 NOTE — ED NOTES
Pt noted to be at the end of the stretcher, pt pulled up on bed  pt reports she has not had her breakfast yet,  pt educated that this writer will order her breakfast,  pt pulled back up on stretcher Pt and repositioned for comfort.   Pt awaits transportation home     Claudene Fore, [place]  11/06/23 3242

## 2023-11-06 NOTE — ED NOTES
Attempting to climb out of bed .  Assisted back to bed by 2 staff assist. Dameon Robert \" you know I cant walk\"      Lili Soria [place]  11/06/23 5445

## 2023-11-06 NOTE — ED NOTES
Trying to get out of bed . Legs hanging off edge of bed . Pulled up with 2 staff assist . Kacy Olmos to have visual hallucinations .  States \" do you see that baby on the wall\"     Glenys Fulton  11/06/23 9767

## 2023-11-06 NOTE — ED NOTES
Yelling out \" come get this baby\" needs frequent redirection. Continues to have visual hallucinations .  MD aware      Frankey Frieze, RN  11/06/23 1934

## 2023-11-06 NOTE — PROGRESS NOTES
Contacted [facility] to arrange BLS transport of patient back to her residence. Discussed patient condition, and need for transport. ETA is 0930. ED is aware.

## 2023-11-19 ENCOUNTER — HOSPITAL ENCOUNTER (EMERGENCY)
Facility: HOSPITAL | Age: 88
Discharge: HOME | End: 2023-11-19
Attending: EMERGENCY MEDICINE
Payer: MEDICARE

## 2023-11-19 ENCOUNTER — APPOINTMENT (OUTPATIENT)
Facility: HOSPITAL | Age: 88
End: 2023-11-19
Payer: MEDICARE

## 2023-11-19 VITALS
RESPIRATION RATE: 16 BRPM | HEART RATE: 68 BPM | OXYGEN SATURATION: 99 % | HEIGHT: 64 IN | SYSTOLIC BLOOD PRESSURE: 173 MMHG | BODY MASS INDEX: 24.88 KG/M2 | TEMPERATURE: 97.8 F | DIASTOLIC BLOOD PRESSURE: 77 MMHG | WEIGHT: 145.72 LBS

## 2023-11-19 DIAGNOSIS — R51.9 ACUTE NONINTRACTABLE HEADACHE, UNSPECIFIED HEADACHE TYPE: Primary | ICD-10-CM

## 2023-11-19 DIAGNOSIS — R51.9 NONINTRACTABLE HEADACHE, UNSPECIFIED CHRONICITY PATTERN, UNSPECIFIED HEADACHE TYPE: ICD-10-CM

## 2023-11-19 DIAGNOSIS — N18.9 CHRONIC KIDNEY DISEASE, UNSPECIFIED CKD STAGE: ICD-10-CM

## 2023-11-19 LAB
ALBUMIN SERPL-MCNC: 3.2 G/DL (ref 3.5–5)
ALBUMIN/GLOB SERPL: 0.5 (ref 1.1–2.2)
ALP SERPL-CCNC: 179 U/L (ref 45–117)
ALT SERPL-CCNC: 20 U/L (ref 12–78)
ANION GAP SERPL CALC-SCNC: 10 MMOL/L (ref 5–15)
APPEARANCE UR: CLEAR
AST SERPL-CCNC: 28 U/L (ref 15–37)
BACTERIA URNS QL MICRO: NEGATIVE /HPF
BASOPHILS # BLD: 0.1 K/UL (ref 0–0.1)
BASOPHILS NFR BLD: 1 % (ref 0–1)
BILIRUB SERPL-MCNC: 0.7 MG/DL (ref 0.2–1)
BILIRUB UR QL: NEGATIVE
BUN SERPL-MCNC: 38 MG/DL (ref 6–20)
BUN/CREAT SERPL: 20 (ref 12–20)
CALCIUM SERPL-MCNC: 10.6 MG/DL (ref 8.5–10.1)
CHLORIDE SERPL-SCNC: 100 MMOL/L (ref 97–108)
CO2 SERPL-SCNC: 28 MMOL/L (ref 21–32)
COLOR UR: ABNORMAL
CREAT SERPL-MCNC: 1.9 MG/DL (ref 0.55–1.02)
DIFFERENTIAL METHOD BLD: NORMAL
EOSINOPHIL # BLD: 0.1 K/UL (ref 0–0.4)
EOSINOPHIL NFR BLD: 1 % (ref 0–7)
EPITH CASTS URNS QL MICRO: ABNORMAL /LPF
ERYTHROCYTE [DISTWIDTH] IN BLOOD BY AUTOMATED COUNT: 14.2 % (ref 11.5–14.5)
GLOBULIN SER CALC-MCNC: 6.1 G/DL (ref 2–4)
GLUCOSE SERPL-MCNC: 142 MG/DL (ref 65–100)
GLUCOSE UR STRIP.AUTO-MCNC: NEGATIVE MG/DL
HCT VFR BLD AUTO: 39.2 % (ref 35–47)
HGB BLD-MCNC: 12.7 G/DL (ref 11.5–16)
HGB UR QL STRIP: NEGATIVE
IMM GRANULOCYTES # BLD AUTO: 0 K/UL (ref 0–0.04)
IMM GRANULOCYTES NFR BLD AUTO: 0 % (ref 0–0.5)
KETONES UR QL STRIP.AUTO: NEGATIVE MG/DL
LEUKOCYTE ESTERASE UR QL STRIP.AUTO: NEGATIVE
LYMPHOCYTES # BLD: 2 K/UL (ref 0.8–3.5)
LYMPHOCYTES NFR BLD: 23 % (ref 12–49)
MAGNESIUM SERPL-MCNC: 1.3 MG/DL (ref 1.6–2.4)
MCH RBC QN AUTO: 28 PG (ref 26–34)
MCHC RBC AUTO-ENTMCNC: 32.4 G/DL (ref 30–36.5)
MCV RBC AUTO: 86.3 FL (ref 80–99)
MONOCYTES # BLD: 0.5 K/UL (ref 0–1)
MONOCYTES NFR BLD: 6 % (ref 5–13)
NEUTS SEG # BLD: 6.1 K/UL (ref 1.8–8)
NEUTS SEG NFR BLD: 69 % (ref 32–75)
NITRITE UR QL STRIP.AUTO: NEGATIVE
NRBC # BLD: 0 K/UL (ref 0–0.01)
NRBC BLD-RTO: 0 PER 100 WBC
PH UR STRIP: 8 (ref 5–8)
PLATELET # BLD AUTO: 239 K/UL (ref 150–400)
PMV BLD AUTO: 10.4 FL (ref 8.9–12.9)
POTASSIUM SERPL-SCNC: 4.2 MMOL/L (ref 3.5–5.1)
PROT SERPL-MCNC: 9.3 G/DL (ref 6.4–8.2)
PROT UR STRIP-MCNC: 100 MG/DL
RBC # BLD AUTO: 4.54 M/UL (ref 3.8–5.2)
RBC #/AREA URNS HPF: ABNORMAL /HPF (ref 0–5)
SODIUM SERPL-SCNC: 138 MMOL/L (ref 136–145)
SP GR UR REFRACTOMETRY: 1.01 (ref 1–1.03)
TROPONIN I SERPL HS-MCNC: 24 NG/L (ref 0–51)
URINE CULTURE IF INDICATED: ABNORMAL
UROBILINOGEN UR QL STRIP.AUTO: 0.2 EU/DL (ref 0.2–1)
WBC # BLD AUTO: 8.8 K/UL (ref 3.6–11)
WBC URNS QL MICRO: ABNORMAL /HPF (ref 0–4)

## 2023-11-19 PROCEDURE — 84484 ASSAY OF TROPONIN QUANT: CPT

## 2023-11-19 PROCEDURE — 99284 EMERGENCY DEPT VISIT MOD MDM: CPT

## 2023-11-19 PROCEDURE — 81001 URINALYSIS AUTO W/SCOPE: CPT

## 2023-11-19 PROCEDURE — 6370000000 HC RX 637 (ALT 250 FOR IP): Performed by: EMERGENCY MEDICINE

## 2023-11-19 PROCEDURE — 36415 COLL VENOUS BLD VENIPUNCTURE: CPT

## 2023-11-19 PROCEDURE — 80053 COMPREHEN METABOLIC PANEL: CPT

## 2023-11-19 PROCEDURE — 70450 CT HEAD/BRAIN W/O DYE: CPT

## 2023-11-19 PROCEDURE — 83735 ASSAY OF MAGNESIUM: CPT

## 2023-11-19 PROCEDURE — 51701 INSERT BLADDER CATHETER: CPT

## 2023-11-19 PROCEDURE — 85025 COMPLETE CBC W/AUTO DIFF WBC: CPT

## 2023-11-19 RX ORDER — HYDROCODONE BITARTRATE AND ACETAMINOPHEN 5; 325 MG/1; MG/1
1 TABLET ORAL EVERY 8 HOURS PRN
Qty: 9 TABLET | Refills: 0 | Status: SHIPPED | OUTPATIENT
Start: 2023-11-19 | End: 2023-11-22

## 2023-11-19 RX ORDER — ONDANSETRON 4 MG/1
4 TABLET, ORALLY DISINTEGRATING ORAL ONCE
Status: COMPLETED | OUTPATIENT
Start: 2023-11-19 | End: 2023-11-19

## 2023-11-19 RX ORDER — ONDANSETRON 4 MG/1
4 TABLET, ORALLY DISINTEGRATING ORAL 3 TIMES DAILY PRN
Qty: 20 TABLET | Refills: 0 | Status: SHIPPED | OUTPATIENT
Start: 2023-11-19

## 2023-11-19 RX ORDER — HYDROCODONE BITARTRATE AND ACETAMINOPHEN 5; 325 MG/1; MG/1
1 TABLET ORAL
Status: COMPLETED | OUTPATIENT
Start: 2023-11-19 | End: 2023-11-19

## 2023-11-19 RX ADMIN — HYDROCODONE BITARTRATE AND ACETAMINOPHEN 1 TABLET: 5; 325 TABLET ORAL at 06:24

## 2023-11-19 RX ADMIN — ONDANSETRON 4 MG: 4 TABLET, ORALLY DISINTEGRATING ORAL at 06:51

## 2023-11-19 ASSESSMENT — PAIN DESCRIPTION - PAIN TYPE: TYPE: ACUTE PAIN

## 2023-11-19 ASSESSMENT — PAIN SCALES - GENERAL
PAINLEVEL_OUTOF10: 2
PAINLEVEL_OUTOF10: 5
PAINLEVEL_OUTOF10: 2

## 2023-11-19 ASSESSMENT — ENCOUNTER SYMPTOMS
VOMITING: 0
SHORTNESS OF BREATH: 0
BACK PAIN: 0
SORE THROAT: 0
ABDOMINAL PAIN: 0

## 2023-11-19 ASSESSMENT — PAIN - FUNCTIONAL ASSESSMENT: PAIN_FUNCTIONAL_ASSESSMENT: 0-10

## 2023-11-19 NOTE — PROGRESS NOTES
Writer contacted [facility] and arranged BLS transport for this patient to her home residence after ED discharge. Requested information provided (Dx, wt, home address, room air, informed patient is nonambulatory, no isolation precautions and insurance information verified) ETA 1015-- CAMRON Cazares made aware.

## 2023-11-19 NOTE — ED NOTES
Discharge instructions reviewed with patient. Opportunity for questions was provided. All questions answered.       José Zapata RN  11/19/23 8656

## 2023-11-19 NOTE — ED NOTES
Per MD, an IV is not needed for medications or fluids. Labs have been obtained and sent to lab.      Soha Garcia RN  11/19/23 2448

## 2023-11-19 NOTE — ED NOTES
Awakened for medication. Denies nausea.  Offers no complaints at this time      Glenys Garcia  11/19/23 8641

## 2023-11-19 NOTE — ED PROVIDER NOTES
[facility] EMERGENCY DEP  EMERGENCY DEPARTMENT ENCOUNTER       Pt Name: Julio Molina  MRN: [id number]  [address] [facility] [place] 7/24/1933  Date of evaluation: 11/19/2023  Provider: Jayme Mandel MD   PCP: AI Muniz NP  Note Started: 6:45 AM 11/19/23      FINAL IMPRESSION     1. Acute nonintractable headache, unspecified headache type          DISPOSITION/PLAN     Disposition:  DISPOSITION        {Disposition (Optional):28024}     PATIENT REFERRED TO:  AI Muniz NP  [address] [address]  [phone]    Schedule an appointment as soon as possible for a visit in 2 days  For Follow Up          DISCHARGE MEDICATIONS:     Medication List        START taking these medications      HYDROcodone-acetaminophen 5-325 MG per tablet  Commonly known as: Norco  Take 1 tablet by mouth every 8 hours as needed for Pain (severe pain) for up to 3 days. Intended supply: 3 days.  Take lowest dose possible to manage pain Max Daily Amount: 3 tablets     ondansetron 4 MG disintegrating tablet  Commonly known as: ZOFRAN-ODT  Take 1 tablet by mouth 3 times daily as needed for Nausea or Vomiting            ASK your doctor about these medications      acetaminophen 500 MG tablet  Commonly known as: TYLENOL     * amLODIPine 5 MG tablet  Commonly known as: NORVASC     * amLODIPine 2.5 MG tablet  Commonly known as: NORVASC  TAKE 1 TABLET DAILY FOR HIGH BLOOD PRESSURE     ammonium lactate 12 % lotion  Commonly known as: LAC-HYDRIN     cetirizine 5 MG tablet  Commonly known as: ZYRTEC  Take 1 tablet by mouth daily     Eucerin Eczema Relief 1 % Crea  Generic drug: Colloidal Oatmeal  Apply to affected area twice a day  upper and lower extremities     furosemide 40 MG tablet  Commonly known as: LASIX  TAKE 1 TABLET DAILY FOR VISIBLE WATER RETENTION     ondansetron 4 MG tablet  Commonly known as: ZOFRAN     risperiDONE 0.25 MG tablet  Commonly known as: RisperDAL  Take 1 tablet by mouth nightly for 14 days     therapeutic

## 2023-11-19 NOTE — ED NOTES
Awakened for pain medication.  Sister at [address] [facility], [address], [address] [place], [place]  11/19/23 1727

## 2023-11-19 NOTE — ED NOTES
Handoff given to Graham Ibarra, EMT with [facility] on this patient. [facility] assumes care of patient and will be transporting her back to her residence. Sister at home waiting for patient.      Yoselin Liu RN  11/19/23 4130

## 2023-11-19 NOTE — ED NOTES
Vomited approx 200ml green liquid.  Sister at [address] [place] [address], [id number], [place], [place]  11/19/23 6939

## 2023-11-19 NOTE — ED NOTES
Pt provided extra warm blanket. Pt states her head feels a lot better. Pt does not need to urinate at this time.      Saurabh Tavarez RN  11/19/23 1566       Saurabh Tavarez RN  11/19/23 4566

## 2023-12-08 ENCOUNTER — TELEPHONE (OUTPATIENT)
Age: 88
End: 2023-12-08

## 2023-12-08 NOTE — TELEPHONE ENCOUNTER
Orin Bernheim would like for Luz Maria Matias to give her a call sometime Monday to see what her next steps are to get Raymond Scanlon into a skilled nursing facility.

## 2024-01-30 RX ORDER — METOPROLOL TARTRATE 50 MG/1
50 TABLET, FILM COATED ORAL 2 TIMES DAILY
Qty: 180 TABLET | Refills: 3 | Status: SHIPPED | OUTPATIENT
Start: 2024-01-30

## 2024-03-06 NOTE — PROGRESS NOTES
July Fuentes,T Car Nurse Msg Calderon; Childs Niya  Patient needs PCI to [facility] on Monday, March 18th with Dr. Cox and will stay inpatient for [facility] ( [facility]OTT ) on Friday, March 22nd, 2024.    DX: CAD / Severe MR    Thank you ,    Valve team.       Home Care  Face to Face Encounter  Roxy Cabot is a 80 y.o. female presenting for/with:    Primary Diagnosis: PVD,venous statis with ulcer  right ankle  Date of Face to Face:  01/05/2023                          Face to Face Encounter findings are related to primary reason for home care:   yes. 1. I certify that the patient needs intermittent care as follows: skilled nursing care:  skilled observation/assessment, patient education, complex care plan management, wound care, and rehabilitative nursing  physical therapy: strengthening, stretching/ROM, and balance training    2. I certify that this patient is homebound, that is:      patient's condition makes leaving the home medically contraindicated;    3) patient has a normal inability to leave the home and leaving the home requires considerable and taxing effort. Patient may leave the home for infrequent and short duration for medical reasons, and occasional absences for non-medical reasons. Homebound status is due to the following functional limitations: Patient with activity restriction non-weight bearing due to wound on the venous stasis ulcer on the right lower extremity and multiple blisters on both lower extreities. (body part). Patient required to elevate lower extremities and limit time in the dependent position for edema management due to increased peripheral edema. 3. I certify that this patient is under my care and that I, or a nurse practitioner or [address] [place] [address], or clinical nurse specialist, or certified nurse midwife, working with me, had a Face-to-Face Encounter that meets the physician Face-to-Face Encounter requirements.   The following are the clinical findings from the [address] [place] [address] encounter that support the need for skilled services and is a summary of the encounter: See attached progess note   Agustin COLMENARESC

## 2024-06-03 NOTE — PROGRESS NOTES
Comprehensive Nutrition Assessment    Type and Reason for Visit: Reassess    Nutrition Recommendations/Plan:   Continue diet as ordered. Monitor K+ trends and adjust as necessary. Continue Nepro shakes po BID with meals. Malnutrition Assessment:  Malnutrition Status: Moderate malnutrition (03/10/23 1452)    Context:  Acute illness     Findings of the 6 clinical characteristics of malnutrition:   Energy Intake:     Weight Loss:  Greater than 5% over 1 month     Body Fat Loss:  Mild body fat loss, Triceps, Buccal region   Muscle Mass Loss:  Mild muscle mass loss, Clavicles (pectoralis & deltoids)  Fluid Accumulation:  Unable to assess,     Strength:  Not performed     Nutrition Assessment:    3/14: Chart reviewed; med noted for AGATA with recent hyperkalemia. K+ today 5.6. Pt consumed 25-50% of lunch tray and consumed 2-bottles of Nepro today. No new nutrition needs or complaints voiced by pt. Daughter present during discussion today. Patient Vitals for the past 168 hrs:   % Diet Eaten   03/12/23 1800 51 - 75%   03/09/23 1628 76 - 100%   03/09/23 1100 0%     Last Weight Metric  Weight Loss Metrics 3/14/2023 3/9/2023 3/8/2023 1/12/2023 1/5/2023 12/20/2022 12/5/2022   Today's Wt 128 lb 8.5 oz - 149 lb 9.6 oz - 209 lb 207 lb 207 lb   BMI - 22.06 kg/m2 25.68 kg/m2 35.87 kg/m2 35.87 kg/m2 35.53 kg/m2 35.53 kg/m2      3/10: Chart reviewed; med noted for acute on chronic kidney injury on admission, previously elevated K+ but now WNL. RD received an auto-nutrition referral per poor appetite identified on admission screen. RD visited with pt at bedside, reports decreased appetite which was also confirmed per po intake at breakfast and lunch. Breakfast tray untouched when I arrived; was able to get pt to take a bite of the [place] citizen toast and eggs. Only consumed a couple bites of the dessert at lunch, declined entree/sides. RD was able to encourage pt to consume most of her Ensure shake.  Difficult to determine actual Please follow-up with indicated surgeon in the next week.  Please do not bear any weight on that ankle.  If you develop increased pain, numbness, swelling please return to the ER.    weight loss as suspect pt has not lost 60 lbs per documentation x 1 month. Pt stated weight 161 lbs; weight documented was 149 lbs on current admission but no weight source. Weight documented last month was 209 lbs. Pt does reveal moderate muscle wasting and mild fat loss meeting criteria for moderate malnutrition. Nutrition Related Findings:    BM: 3/13; Labs: K+ 5.6; Meds: Retacrit, Lasix Wound Type: None    Current Nutrition Intake & Therapies:        ADULT DIET Regular  ADULT ORAL NUTRITION SUPPLEMENT Breakfast, Dinner; Renal Supplement    Anthropometric Measures:  Height: 5' 4\" (162.6 cm)  Ideal Body Weight (IBW): 120 lbs (55 kg)     Current Body Wt:  67.9 kg (149 lb 11.1 oz), 124.7 % IBW. Stated  Current BMI (kg/m2): 25.7                          BMI Category: Overweight (BMI 25.0-29. 9)    Estimated Daily Nutrient Needs:  Energy Requirements Based On: Formula  Weight Used for Energy Requirements: Current  Energy (kcal/day): 0308-5408 (BMR x 1. 3AF)  Weight Used for Protein Requirements: Current  Protein (g/day): 68 (1.0 g/kg bw)  Method Used for Fluid Requirements: 1 ml/kcal  Fluid (ml/day): 2685-1930 ml/day    Nutrition Diagnosis:   Inadequate protein-energy intake related to  (decreased appetite) as evidenced by intake 0-25%, weight loss    Nutrition Interventions:   Food and/or Nutrient Delivery: Continue current diet, Modify oral nutrition supplement  Nutrition Education/Counseling: No recommendations at this time  Coordination of Nutrition Care: Continue to monitor while inpatient       Goals:     Goals: PO intake 50% or greater, by next RD assessment       Nutrition Monitoring and Evaluation:   Behavioral-Environmental Outcomes: None identified  Food/Nutrient Intake Outcomes: Food and nutrient intake, Supplement intake  Physical Signs/Symptoms Outcomes: Biochemical data, Skin, Weight    Discharge Planning:    Continue current diet, Continue oral nutrition supplement    Darwin Wakler RD  Contact: